# Patient Record
Sex: MALE | Race: WHITE | Employment: FULL TIME | URBAN - METROPOLITAN AREA
[De-identification: names, ages, dates, MRNs, and addresses within clinical notes are randomized per-mention and may not be internally consistent; named-entity substitution may affect disease eponyms.]

---

## 2017-01-23 ENCOUNTER — OFFICE VISIT (OUTPATIENT)
Dept: INTERNAL MEDICINE CLINIC | Age: 57
End: 2017-01-23

## 2017-01-23 VITALS
HEIGHT: 72 IN | OXYGEN SATURATION: 97 % | HEART RATE: 82 BPM | WEIGHT: 189 LBS | BODY MASS INDEX: 25.6 KG/M2 | DIASTOLIC BLOOD PRESSURE: 90 MMHG | SYSTOLIC BLOOD PRESSURE: 159 MMHG

## 2017-01-23 DIAGNOSIS — E78.5 HYPERLIPIDEMIA, UNSPECIFIED HYPERLIPIDEMIA TYPE: ICD-10-CM

## 2017-01-23 DIAGNOSIS — E03.9 HYPOTHYROIDISM (ACQUIRED): ICD-10-CM

## 2017-01-23 DIAGNOSIS — R79.89 LOW TESTOSTERONE: ICD-10-CM

## 2017-01-23 PROCEDURE — 99213 OFFICE O/P EST LOW 20 MIN: CPT | Performed by: FAMILY MEDICINE

## 2017-01-23 RX ORDER — LEVOTHYROXINE AND LIOTHYRONINE 76; 18 UG/1; UG/1
120 TABLET ORAL DAILY
Qty: 90 TABLET | Refills: 1 | Status: SHIPPED | OUTPATIENT
Start: 2017-01-23 | End: 2017-07-13 | Stop reason: SDUPTHER

## 2017-01-25 DIAGNOSIS — I10 ESSENTIAL HYPERTENSION: Primary | ICD-10-CM

## 2017-01-29 RX ORDER — LISINOPRIL AND HYDROCHLOROTHIAZIDE 12.5; 1 MG/1; MG/1
1 TABLET ORAL DAILY
Qty: 30 TABLET | Refills: 2 | Status: SHIPPED | OUTPATIENT
Start: 2017-01-29 | End: 2017-04-18 | Stop reason: SDUPTHER

## 2017-02-06 ENCOUNTER — TELEPHONE (OUTPATIENT)
Dept: INTERNAL MEDICINE CLINIC | Age: 57
End: 2017-02-06

## 2017-02-28 ENCOUNTER — HOSPITAL ENCOUNTER (OUTPATIENT)
Dept: OTHER | Age: 57
Discharge: HOME | End: 2017-02-28
Attending: EMERGENCY MEDICINE | Admitting: FAMILY MEDICINE

## 2017-02-28 LAB
ALBUMIN SERPL-MCNC: 4.5 G/DL (ref 3.4–5)
ALP BLD-CCNC: 136 U/L (ref 40–129)
ALT SERPL-CCNC: 33 U/L (ref 10–40)
ANION GAP SERPL CALCULATED.3IONS-SCNC: 12 MMOL/L (ref 3–16)
AST SERPL-CCNC: 27 U/L (ref 15–37)
BILIRUB SERPL-MCNC: 0.8 MG/DL (ref 0–1)
BILIRUBIN DIRECT: <0.2 MG/DL (ref 0–0.3)
BILIRUBIN, INDIRECT: ABNORMAL MG/DL (ref 0–1)
BUN BLDV-MCNC: 26 MG/DL (ref 7–20)
CALCIUM SERPL-MCNC: 9.7 MG/DL (ref 8.3–10.6)
CHLORIDE BLD-SCNC: 99 MMOL/L (ref 99–110)
CHOLESTEROL, TOTAL: 267 MG/DL (ref 0–199)
CO2: 27 MMOL/L (ref 21–32)
CREAT SERPL-MCNC: 1.2 MG/DL (ref 0.9–1.3)
GFR AFRICAN AMERICAN: >60
GFR NON-AFRICAN AMERICAN: >60
GLUCOSE BLD-MCNC: 103 MG/DL (ref 70–99)
HDLC SERPL-MCNC: 43 MG/DL (ref 40–60)
LDL CHOLESTEROL CALCULATED: 178 MG/DL
POTASSIUM SERPL-SCNC: 4.4 MMOL/L (ref 3.5–5.1)
SODIUM BLD-SCNC: 138 MMOL/L (ref 136–145)
TOTAL PROTEIN: 7.8 G/DL (ref 6.4–8.2)
TRIGL SERPL-MCNC: 232 MG/DL (ref 0–150)
TSH SERPL DL<=0.05 MIU/L-ACNC: 2.86 UIU/ML (ref 0.27–4.2)
VLDLC SERPL CALC-MCNC: 46 MG/DL

## 2017-05-02 ENCOUNTER — TELEPHONE (OUTPATIENT)
Dept: INTERNAL MEDICINE CLINIC | Age: 57
End: 2017-05-02

## 2017-05-09 DIAGNOSIS — I10 ESSENTIAL HYPERTENSION: ICD-10-CM

## 2017-05-12 DIAGNOSIS — R79.89 LOW TESTOSTERONE: Primary | ICD-10-CM

## 2017-05-12 RX ORDER — LISINOPRIL AND HYDROCHLOROTHIAZIDE 12.5; 1 MG/1; MG/1
TABLET ORAL
Qty: 30 TABLET | Refills: 2 | Status: SHIPPED | OUTPATIENT
Start: 2017-05-12 | End: 2017-08-18 | Stop reason: SDUPTHER

## 2017-05-12 RX ORDER — TESTOSTERONE 16.2 MG/G
GEL TRANSDERMAL
Qty: 75 G | Refills: 2 | Status: SHIPPED | OUTPATIENT
Start: 2017-05-12 | End: 2017-08-29 | Stop reason: SDUPTHER

## 2017-05-18 ENCOUNTER — TELEPHONE (OUTPATIENT)
Dept: INTERNAL MEDICINE CLINIC | Age: 57
End: 2017-05-18

## 2017-08-18 DIAGNOSIS — I10 ESSENTIAL HYPERTENSION: ICD-10-CM

## 2017-08-20 RX ORDER — LISINOPRIL AND HYDROCHLOROTHIAZIDE 12.5; 1 MG/1; MG/1
TABLET ORAL
Qty: 30 TABLET | Refills: 0 | Status: SHIPPED | OUTPATIENT
Start: 2017-08-20 | End: 2017-08-29 | Stop reason: SDUPTHER

## 2017-08-29 ENCOUNTER — OFFICE VISIT (OUTPATIENT)
Dept: INTERNAL MEDICINE CLINIC | Age: 57
End: 2017-08-29

## 2017-08-29 VITALS
SYSTOLIC BLOOD PRESSURE: 110 MMHG | HEIGHT: 72 IN | HEART RATE: 83 BPM | BODY MASS INDEX: 23.81 KG/M2 | OXYGEN SATURATION: 97 % | DIASTOLIC BLOOD PRESSURE: 80 MMHG | TEMPERATURE: 98.2 F | WEIGHT: 175.8 LBS | RESPIRATION RATE: 16 BRPM

## 2017-08-29 DIAGNOSIS — R79.89 LOW TESTOSTERONE: ICD-10-CM

## 2017-08-29 DIAGNOSIS — E03.9 HYPOTHYROIDISM, UNSPECIFIED TYPE: Primary | ICD-10-CM

## 2017-08-29 DIAGNOSIS — E78.00 ELEVATED LDL CHOLESTEROL LEVEL: ICD-10-CM

## 2017-08-29 DIAGNOSIS — I10 ESSENTIAL HYPERTENSION: ICD-10-CM

## 2017-08-29 DIAGNOSIS — N52.9 ERECTILE DYSFUNCTION, UNSPECIFIED ERECTILE DYSFUNCTION TYPE: ICD-10-CM

## 2017-08-29 DIAGNOSIS — T25.222A SECOND DEGREE BURN OF FOOT, LEFT, INITIAL ENCOUNTER: ICD-10-CM

## 2017-08-29 PROCEDURE — 99214 OFFICE O/P EST MOD 30 MIN: CPT | Performed by: FAMILY MEDICINE

## 2017-08-29 RX ORDER — TESTOSTERONE 16.2 MG/G
GEL TRANSDERMAL
Qty: 75 G | Refills: 2 | Status: SHIPPED | OUTPATIENT
Start: 2017-08-29

## 2017-08-29 RX ORDER — LISINOPRIL AND HYDROCHLOROTHIAZIDE 12.5; 1 MG/1; MG/1
TABLET ORAL
Qty: 90 TABLET | Refills: 1 | Status: SHIPPED | OUTPATIENT
Start: 2017-08-29 | End: 2018-02-06 | Stop reason: SDUPTHER

## 2017-08-29 RX ORDER — TADALAFIL 10 MG/1
10 TABLET ORAL PRN
Qty: 10 TABLET | Refills: 5 | Status: SHIPPED | OUTPATIENT
Start: 2017-08-29 | End: 2018-02-20 | Stop reason: SDUPTHER

## 2017-08-29 ASSESSMENT — ENCOUNTER SYMPTOMS: SHORTNESS OF BREATH: 0

## 2017-12-12 ENCOUNTER — TELEPHONE (OUTPATIENT)
Dept: FAMILY MEDICINE CLINIC | Age: 57
End: 2017-12-12

## 2018-01-09 ENCOUNTER — OFFICE VISIT (OUTPATIENT)
Dept: FAMILY MEDICINE CLINIC | Age: 58
End: 2018-01-09

## 2018-01-09 VITALS
DIASTOLIC BLOOD PRESSURE: 84 MMHG | OXYGEN SATURATION: 98 % | BODY MASS INDEX: 25.14 KG/M2 | HEART RATE: 88 BPM | WEIGHT: 185.4 LBS | SYSTOLIC BLOOD PRESSURE: 120 MMHG

## 2018-01-09 DIAGNOSIS — H53.8 BLURRY VISION, BILATERAL: ICD-10-CM

## 2018-01-09 DIAGNOSIS — Z13.21 ENCOUNTER FOR VITAMIN DEFICIENCY SCREENING: ICD-10-CM

## 2018-01-09 DIAGNOSIS — E03.8 OTHER SPECIFIED HYPOTHYROIDISM: ICD-10-CM

## 2018-01-09 DIAGNOSIS — Z12.5 SCREENING FOR PROSTATE CANCER: ICD-10-CM

## 2018-01-09 DIAGNOSIS — Z72.0 TOBACCO ABUSE: ICD-10-CM

## 2018-01-09 DIAGNOSIS — Z23 NEED FOR PROPHYLACTIC VACCINATION AGAINST DIPHTHERIA-TETANUS-PERTUSSIS (DTP): ICD-10-CM

## 2018-01-09 DIAGNOSIS — R79.89 LOW TESTOSTERONE IN MALE: ICD-10-CM

## 2018-01-09 DIAGNOSIS — Z13.1 SCREENING FOR DIABETES MELLITUS: ICD-10-CM

## 2018-01-09 DIAGNOSIS — Z76.89 ENCOUNTER TO ESTABLISH CARE: ICD-10-CM

## 2018-01-09 DIAGNOSIS — Z13.220 SCREENING FOR CHOLESTEROL LEVEL: ICD-10-CM

## 2018-01-09 LAB
BASOPHILS ABSOLUTE: 0 K/UL (ref 0–0.2)
BASOPHILS RELATIVE PERCENT: 0.6 %
EOSINOPHILS ABSOLUTE: 0.4 K/UL (ref 0–0.6)
EOSINOPHILS RELATIVE PERCENT: 7 %
HCT VFR BLD CALC: 50.1 % (ref 40.5–52.5)
HEMOGLOBIN: 17.4 G/DL (ref 13.5–17.5)
LYMPHOCYTES ABSOLUTE: 2.3 K/UL (ref 1–5.1)
LYMPHOCYTES RELATIVE PERCENT: 39.1 %
MCH RBC QN AUTO: 34 PG (ref 26–34)
MCHC RBC AUTO-ENTMCNC: 34.7 G/DL (ref 31–36)
MCV RBC AUTO: 98.1 FL (ref 80–100)
MONOCYTES ABSOLUTE: 0.4 K/UL (ref 0–1.3)
MONOCYTES RELATIVE PERCENT: 6.8 %
NEUTROPHILS ABSOLUTE: 2.8 K/UL (ref 1.7–7.7)
NEUTROPHILS RELATIVE PERCENT: 46.5 %
PDW BLD-RTO: 13.8 % (ref 12.4–15.4)
PLATELET # BLD: 258 K/UL (ref 135–450)
PMV BLD AUTO: 8.1 FL (ref 5–10.5)
RBC # BLD: 5.11 M/UL (ref 4.2–5.9)
WBC # BLD: 5.9 K/UL (ref 4–11)

## 2018-01-09 PROCEDURE — 90471 IMMUNIZATION ADMIN: CPT | Performed by: NURSE PRACTITIONER

## 2018-01-09 PROCEDURE — 99204 OFFICE O/P NEW MOD 45 MIN: CPT | Performed by: NURSE PRACTITIONER

## 2018-01-09 PROCEDURE — 36415 COLL VENOUS BLD VENIPUNCTURE: CPT | Performed by: NURSE PRACTITIONER

## 2018-01-09 PROCEDURE — 90715 TDAP VACCINE 7 YRS/> IM: CPT | Performed by: NURSE PRACTITIONER

## 2018-01-09 RX ORDER — LEVOTHYROXINE AND LIOTHYRONINE 57; 13.5 UG/1; UG/1
90 TABLET ORAL DAILY
Qty: 90 TABLET | Refills: 1 | Status: SHIPPED | OUTPATIENT
Start: 2018-01-09 | End: 2018-08-14 | Stop reason: SDUPTHER

## 2018-01-09 RX ORDER — LEVOTHYROXINE AND LIOTHYRONINE 19; 4.5 UG/1; UG/1
30 TABLET ORAL DAILY
Qty: 90 TABLET | Refills: 1 | Status: SHIPPED | OUTPATIENT
Start: 2018-01-09 | End: 2018-08-14 | Stop reason: SDUPTHER

## 2018-01-09 RX ORDER — NICOTINE 21 MG/24HR
1 PATCH, TRANSDERMAL 24 HOURS TRANSDERMAL EVERY 24 HOURS
Qty: 30 PATCH | Refills: 1 | Status: SHIPPED | OUTPATIENT
Start: 2018-01-09 | End: 2018-01-22 | Stop reason: SINTOL

## 2018-01-09 ASSESSMENT — PATIENT HEALTH QUESTIONNAIRE - PHQ9
1. LITTLE INTEREST OR PLEASURE IN DOING THINGS: 0
2. FEELING DOWN, DEPRESSED OR HOPELESS: 0
SUM OF ALL RESPONSES TO PHQ9 QUESTIONS 1 & 2: 0
SUM OF ALL RESPONSES TO PHQ QUESTIONS 1-9: 0

## 2018-01-09 NOTE — PATIENT INSTRUCTIONS
Patient Education        Learning About Benefits From Quitting Smoking  How does quitting smoking make you healthier? If you're thinking about quitting smoking, you may have a few reasons to be smoke-free. Your health may be one of them. · When you quit smoking, you lower your risks for cancer, lung disease, heart attack, stroke, blood vessel disease, and blindness from macular degeneration. · When you're smoke-free, you get sick less often, and you heal faster. You are less likely to get colds, flu, bronchitis, and pneumonia. · As a nonsmoker, you may find that your mood is better and you are less stressed. When and how will you feel healthier? Quitting has real health benefits that start from day 1 of being smoke-free. And the longer you stay smoke-free, the healthier you get and the better you feel. The first hours  · After just 20 minutes, your blood pressure and heart rate go down. That means there's less stress on your heart and blood vessels. · Within 12 hours, the level of carbon monoxide in your blood drops back to normal. That makes room for more oxygen. With more oxygen in your body, you may notice that you have more energy than when you smoked. After 2 weeks  · Your lungs start to work better. · Your risk of heart attack starts to drop. After 1 month  · When your lungs are clear, you cough less and breathe deeper, so it's easier to be active. · Your sense of taste and smell return. That means you can enjoy food more than you have since you started smoking. Over the years  · After 1 year, your risk of heart disease is half what it would be if you kept smoking. · After 5 years, your risk of stroke starts to shrink. Within a few years after that, it's about the same as if you'd never smoked. · After 10 years, your risk of dying from lung cancer is cut by about half. And your risk for many other types of cancer is lower too. How would quitting help others in your life?   When you quit smoking, you improve the health of everyone who now breathes in your smoke. · Their heart, lung, and cancer risks drop, much like yours. · They are sick less. For babies and small children, living smoke-free means they're less likely to have ear infections, pneumonia, and bronchitis. · If you're a woman who is or will be pregnant someday, quitting smoking means a healthier . · Children who are close to you are less likely to become adult smokers. Where can you learn more? Go to [email or web address].[email or web address] org and sign in to your Lucibel account. Enter [phone] in the [facility] box to learn more about \"Learning About Benefits From Quitting Smoking. \"     If you do not have an account, please click on the \"Sign Up Now\" link. Current as of: 2017  Content Version: 11.5  © 7090-4066 Healthwise, Incorporated. Care instructions adapted under license by [facility] ([facility]). If you have questions about a medical condition or this instruction, always ask your healthcare professional. [address] any warranty or liability for your use of this information.

## 2018-01-10 PROBLEM — E78.1 HYPERTRIGLYCERIDEMIA: Status: ACTIVE | Noted: 2018-01-10

## 2018-01-10 PROBLEM — Z72.0 TOBACCO ABUSE: Status: ACTIVE | Noted: 2018-01-10

## 2018-01-10 PROBLEM — E78.5 HYPERLIPIDEMIA: Status: ACTIVE | Noted: 2018-01-10

## 2018-01-10 PROBLEM — R73.03 PREDIABETES: Status: ACTIVE | Noted: 2018-01-10

## 2018-01-10 LAB
A/G RATIO: 1.5 (ref 1.1–2.2)
ALBUMIN SERPL-MCNC: 4.6 G/DL (ref 3.4–5)
ALP BLD-CCNC: 132 U/L (ref 40–129)
ALT SERPL-CCNC: 21 U/L (ref 10–40)
ANION GAP SERPL CALCULATED.3IONS-SCNC: 18 MMOL/L (ref 3–16)
AST SERPL-CCNC: 22 U/L (ref 15–37)
BILIRUB SERPL-MCNC: 0.4 MG/DL (ref 0–1)
BUN BLDV-MCNC: 14 MG/DL (ref 7–20)
CALCIUM SERPL-MCNC: 10.1 MG/DL (ref 8.3–10.6)
CHLORIDE BLD-SCNC: 101 MMOL/L (ref 99–110)
CHOLESTEROL, TOTAL: 266 MG/DL (ref 0–199)
CO2: 21 MMOL/L (ref 21–32)
CREAT SERPL-MCNC: 1.1 MG/DL (ref 0.9–1.3)
ESTIMATED AVERAGE GLUCOSE: 116.9 MG/DL
FOLATE: 6.28 NG/ML (ref 4.78–24.2)
GFR AFRICAN AMERICAN: >60
GFR NON-AFRICAN AMERICAN: >60
GLOBULIN: 3.1 G/DL
GLUCOSE BLD-MCNC: 86 MG/DL (ref 70–99)
HBA1C MFR BLD: 5.7 %
HDLC SERPL-MCNC: 56 MG/DL (ref 40–60)
LDL CHOLESTEROL CALCULATED: 156 MG/DL
POTASSIUM SERPL-SCNC: 4.4 MMOL/L (ref 3.5–5.1)
PROSTATE SPECIFIC ANTIGEN: 1.07 NG/ML (ref 0–4)
SODIUM BLD-SCNC: 140 MMOL/L (ref 136–145)
T3 FREE: 2.6 PG/ML (ref 2.3–4.2)
T4 FREE: 0.7 NG/DL (ref 0.9–1.8)
TOTAL PROTEIN: 7.7 G/DL (ref 6.4–8.2)
TRIGL SERPL-MCNC: 271 MG/DL (ref 0–150)
TSH SERPL DL<=0.05 MIU/L-ACNC: 8.92 UIU/ML (ref 0.27–4.2)
VITAMIN B-12: 439 PG/ML (ref 211–911)
VITAMIN D 25-HYDROXY: 52.5 NG/ML
VLDLC SERPL CALC-MCNC: 54 MG/DL

## 2018-01-10 ASSESSMENT — ENCOUNTER SYMPTOMS
ALLERGIC/IMMUNOLOGIC NEGATIVE: 1
GASTROINTESTINAL NEGATIVE: 1
RESPIRATORY NEGATIVE: 1
EYES NEGATIVE: 1

## 2018-01-10 NOTE — PROGRESS NOTES
Recommend repeat blood work in 3 months for thyroid and 6 months for cholesterol once starting medication.       Recommend follow up in 6 months

## 2018-01-11 ENCOUNTER — TELEPHONE (OUTPATIENT)
Dept: FAMILY MEDICINE CLINIC | Age: 58
End: 2018-01-11

## 2018-01-11 DIAGNOSIS — M79.642 HAND PAIN, LEFT: ICD-10-CM

## 2018-01-11 DIAGNOSIS — R79.89 LOW TESTOSTERONE: Primary | ICD-10-CM

## 2018-01-11 DIAGNOSIS — R79.89 LOW TESTOSTERONE: ICD-10-CM

## 2018-01-11 RX ORDER — ATORVASTATIN CALCIUM 40 MG/1
40 TABLET, FILM COATED ORAL DAILY
Qty: 30 TABLET | Refills: 3 | Status: SHIPPED | OUTPATIENT
Start: 2018-01-11 | End: 2018-01-31

## 2018-01-11 NOTE — PROGRESS NOTES
Take 1 tablet by mouth daily  -     thyroid (ARMOUR) 30 MG tablet; Take 1 tablet by mouth daily    Screening for prostate cancer  -     Psa screening    Blurry vision, bilateral  -     Elizabeth Hutton MD (LISA)    Low testosterone in male  -     Testosterone Free and Total Male  -     Mayelin Crespo MD (LISA)    Need for prophylactic vaccination against diphtheria-tetanus-pertussis (DTP)  -     Tdap (age 10y-63y) IM (Adacel)    Tobacco abuse  -     nicotine (NICODERM CQ) 21 MG/24HR; Place 1 patch onto the skin every 24 hours    Screening for cholesterol level  -     Lipid Panel    Encounter for vitamin deficiency screening  -     Vitamin D 25 Hydroxy  -     Vitamin B12 & Folate    Screening for diabetes mellitus  -     Hemoglobin A1C    Other orders  -     Cancel: INFLUENZA, QUADV, 3 YRS AND OLDER, IM, MDV, 0.5ML (FLUZONE QUADV)  -     Cancel: Pneumococcal polysaccharide vaccine 23-valent PPSV23        Return in about 6 months (around 7/9/2018) for Hypothyroidism, HTN . Patient should call the office immediately with new or ongoing signs or symptoms or worsening, or proceed to the emergency room. If you are on medications which could impair your senses, you are at risk of weakness, falls, dizziness, or drowsiness. You should be careful during activities which could place you at risk of harm, such as climbing, using stairs, operating machinery, or driving vehicles. If you feel you cannot safely do these activities, you should request others to help you, or avoid the activities altogether. If you are drowsy for any other reason, you should use the same precautions as listed above. Call if pattern of symptoms change or persists for an extended time. It is very important that he quit smoking. There are various alternatives available to help with this difficult task, but first and foremost, he must make a firm commitment and decision to quit.  The nature of nicotine addiction is discussed. The usefulness of behavioral therapy is discussed and suggested. The correct use, cost and side effects of nicotine replacement therapy such as gum or patches is discussed. Bupropion and its cost (sometimes not covered fully by insurance) and side effects are reviewed. The quit rates are discussed. I recommend he not allow potential costs of treatment to deter him from using nicotine replacement therapy or bupropion, as the long term economic and health benefits are obvious. Ready to quit: Yes  Counseling given: Yes      Tobacco abuse: Patient was counseled about stopping tobacco use. Discussed harmful effects of continued tobacco use including COPD, cardiovascular disease, and/or death. Discussed the increased risk of pneumonia as well as the potential for substantial decline in lung function.  The following recommendations were given to improve chances of quittin)                   Chances of stopping improve with each attempt     2)                   Encourage all other occupants in the house to quit     3)                   Remove all tobacco products from home, work, and vehicles     4)                   Tobacco cessation aids such as nicotine replacement therapy options

## 2018-01-11 NOTE — TELEPHONE ENCOUNTER
Vipul Fountain said he spoke with you regarding his left hand. He is requesting a referral be made for him to get his hand looked at.

## 2018-01-16 LAB
SEX HORMONE BINDING GLOBULIN: 48 NMOL/L (ref 11–80)
TESTOSTERONE FREE-NONMALE: 85.7 PG/ML (ref 47–244)
TESTOSTERONE TOTAL: 518 NG/DL (ref 220–1000)

## 2018-01-22 ENCOUNTER — TELEPHONE (OUTPATIENT)
Dept: FAMILY MEDICINE CLINIC | Age: 58
End: 2018-01-22

## 2018-01-22 DIAGNOSIS — Z72.0 TOBACCO ABUSE: ICD-10-CM

## 2018-01-22 NOTE — TELEPHONE ENCOUNTER
Recommend starting Nicoderm patches 7mg /24 hours. Please send to pharmacy of his choice. Qty: 30 WITH 3 REFILLS.

## 2018-01-22 NOTE — TELEPHONE ENCOUNTER
Pt said that the nicotine patch is to strong keeping him up at night was wanting to see if he could get something not as strong called in to [address]

## 2018-01-30 ENCOUNTER — OFFICE VISIT (OUTPATIENT)
Dept: ORTHOPEDIC SURGERY | Age: 58
End: 2018-01-30

## 2018-01-30 VITALS — WEIGHT: 185.41 LBS | HEIGHT: 71 IN | BODY MASS INDEX: 25.96 KG/M2

## 2018-01-30 DIAGNOSIS — M79.642 LEFT HAND PAIN: Primary | ICD-10-CM

## 2018-01-30 DIAGNOSIS — M19.049 HAND ARTHRITIS: ICD-10-CM

## 2018-01-30 PROCEDURE — 99243 OFF/OP CNSLTJ NEW/EST LOW 30: CPT | Performed by: ORTHOPAEDIC SURGERY

## 2018-01-30 NOTE — PROGRESS NOTES
the middle finger. No erythema or skin changes    Palpation:  Tenderness about the DIP joint, no warmth. No fluctuance    Range of Motion:  Patient lacks 5-10° of terminal extension, he has approximately 20° of flexion, some crepitance is noted. There is instability with varus and valgus stressing    Strength:  Mild  weakness. Fingers are sensate    Special Tests:  Positive scissoring index DIP and index finger tip beneath the middle finger with finger flexion    Skin: There are no additional worrisome rashes, ulcerations or lesions. Gait: normal    Circulation: well perfused       Radiology:     X-rays obtained and reviewed in office:  Views 3  Location left hand  Impression :  Advanced arthritic narrowing DIP joint index finger with ulnar deviation      Assessment:  Left hand arthritis    Impression:   Encounter Diagnoses   Name Primary?  Left hand pain Yes    Hand arthritis        Office Procedures:  Orders Placed This Encounter   Procedures    XR HAND LEFT (MIN 3 VIEWS)     [id number]     Order Specific Question:   Reason for exam:     Answer:   Hand Pain       Treatment Plan:  We discussed the advanced arthritic nature of the left index finger DIP joint. Options would be activity modifications only with her without bracing, localized cortisone injection, DIP joint arthrodesis. Patient's interested in arthrodesis at this time. We will proceed with left index finger DIP joint arthrodesis. Surgical procedure along with time to recovery was explained. He understands that there will be no further motion of the joint and there will be a buried screw. However, this will improve the alignment and hopefully and typically reliably take away his pain. There will be a period of brace use postsurgically and some activity modifications. He would like to proceed. The risks and benefits of surgical arthrodesis versus non-operative management were discussed thoroughly.   These included, but were not limited

## 2018-01-31 ENCOUNTER — OFFICE VISIT (OUTPATIENT)
Dept: FAMILY MEDICINE CLINIC | Age: 58
End: 2018-01-31

## 2018-01-31 ENCOUNTER — TELEPHONE (OUTPATIENT)
Dept: ORTHOPEDIC SURGERY | Age: 58
End: 2018-01-31

## 2018-01-31 VITALS
SYSTOLIC BLOOD PRESSURE: 120 MMHG | BODY MASS INDEX: 25.87 KG/M2 | OXYGEN SATURATION: 98 % | WEIGHT: 184.8 LBS | HEART RATE: 91 BPM | DIASTOLIC BLOOD PRESSURE: 76 MMHG

## 2018-01-31 DIAGNOSIS — M19.042 PRIMARY LOCALIZED OSTEOARTHROSIS OF LEFT HAND: Primary | ICD-10-CM

## 2018-01-31 DIAGNOSIS — E78.2 MIXED HYPERLIPIDEMIA: ICD-10-CM

## 2018-01-31 DIAGNOSIS — Z01.818 PRE-OP EXAMINATION: Primary | ICD-10-CM

## 2018-01-31 PROCEDURE — 93000 ELECTROCARDIOGRAM COMPLETE: CPT | Performed by: NURSE PRACTITIONER

## 2018-01-31 PROCEDURE — 99214 OFFICE O/P EST MOD 30 MIN: CPT | Performed by: NURSE PRACTITIONER

## 2018-01-31 NOTE — PROGRESS NOTES
01/16/2018 85.7    Office Visit on 01/09/2018   Component Date Value    Cholesterol, Total 01/10/2018 266*    Triglycerides 01/10/2018 271*    HDL 01/10/2018 56     LDL Calculated 01/10/2018 156*    VLDL Cholesterol Calcula* 01/10/2018 54     Vit D, 25-Hydroxy 01/10/2018 52.5     Vitamin B-12 01/10/2018 439     Folate 01/10/2018 6.28     TSH 01/10/2018 8.92*    T4 Free 01/10/2018 0.7*    T3, Free 01/10/2018 2.6     WBC 01/09/2018 5.9     RBC 01/09/2018 5.11     Hemoglobin 01/09/2018 17.4     Hematocrit 01/09/2018 50.1     MCV 01/09/2018 98.1     MCH 01/09/2018 34.0     MCHC 01/09/2018 34.7     RDW 01/09/2018 13.8     Platelets 40/95/3331 258     MPV 01/09/2018 8.1     Neutrophils % 01/09/2018 46.5     Lymphocytes % 01/09/2018 39.1     Monocytes % 01/09/2018 6.8     Eosinophils % 01/09/2018 7.0     Basophils % 01/09/2018 0.6     Neutrophils # 01/09/2018 2.8     Lymphocytes # 01/09/2018 2.3     Monocytes # 01/09/2018 0.4     Eosinophils # 01/09/2018 0.4     Basophils # 01/09/2018 0.0     Sodium 01/10/2018 140     Potassium 01/10/2018 4.4     Chloride 01/10/2018 101     CO2 01/10/2018 21     Anion Gap 01/10/2018 18*    Glucose 01/10/2018 86     BUN 01/10/2018 14     CREATININE 01/10/2018 1.1     GFR Non- 01/10/2018 >60     GFR  01/10/2018 >60     Calcium 01/10/2018 10.1     Total Protein 01/10/2018 7.7     Alb 01/10/2018 4.6     Albumin/Globulin Ratio 01/10/2018 1.5     Total Bilirubin 01/10/2018 0.4     Alkaline Phosphatase 01/10/2018 132*    ALT 01/10/2018 21     AST 01/10/2018 22     Globulin 01/10/2018 3.1     PSA 01/10/2018 1.07     Hemoglobin A1C 01/10/2018 5.7     eAG 01/10/2018 116.9            Assessment:        56 y.o. patient with planned surgery as above.     Known risk factors for perioperative complications:     HTN, HLD, Tobacco abuse     Current medications which may produce withdrawal symptoms if withheld

## 2018-02-01 ENCOUNTER — HOSPITAL ENCOUNTER (OUTPATIENT)
Dept: OCCUPATIONAL THERAPY | Age: 58
Discharge: HOME | End: 2018-02-28
Attending: ORTHOPAEDIC SURGERY | Admitting: ORTHOPAEDIC SURGERY

## 2018-02-01 NOTE — PATIENT INSTRUCTIONS
before coming in for your surgery. Do not apply lotions, perfumes, deodorants, or nail polish. ? · Do not shave the surgical site yourself. ? · Take off all jewelry and piercings. And take out contact lenses, if you wear them. ? At the hospital or surgery center   · Bring a picture ID. ? · The area for surgery is often marked to make sure there are no errors. ? · You will be kept comfortable and safe by your anesthesia provider. The anesthesia may make you sleep. Or it may just numb the area being worked on. Going home   · Be sure you have someone to drive you home. Anesthesia and pain medicine make it unsafe for you to drive. ? · You will be given more specific instructions about recovering from your surgery. They will cover things like diet, wound care, follow-up care, driving, and getting back to your normal routine. When should you call your doctor? · You have questions or concerns. ? · You don't understand how to prepare for your surgery. ? · You become ill before the surgery (such as fever, flu, or a cold). ? · You need to reschedule or have changed your mind about having the surgery. Where can you learn more? Go to [email or web address]. org and sign in to your Squarespace account. Enter Q270 in the [facility] box to learn more about \"How to Prepare for Surgery. \"     If you do not have an account, please click on the \"Sign Up Now\" link. Current as of: May 12, 2017  Content Version: 11.5  © 5814-3551 Healthwise, Incorporated. Care instructions adapted under license by [facility] ([facility]). If you have questions about a medical condition or this instruction, always ask your healthcare professional. [place] [address] any warranty or liability for your use of this information.

## 2018-02-03 DIAGNOSIS — I10 ESSENTIAL HYPERTENSION: ICD-10-CM

## 2018-02-03 RX ORDER — LISINOPRIL AND HYDROCHLOROTHIAZIDE 12.5; 1 MG/1; MG/1
TABLET ORAL
Qty: 30 TABLET | Refills: 0 | OUTPATIENT
Start: 2018-02-03

## 2018-02-05 ENCOUNTER — HOSPITAL ENCOUNTER (OUTPATIENT)
Dept: SURGERY | Age: 58
Discharge: HOME | End: 2018-02-05
Attending: ORTHOPAEDIC SURGERY | Admitting: ORTHOPAEDIC SURGERY

## 2018-02-05 VITALS
SYSTOLIC BLOOD PRESSURE: 143 MMHG | WEIGHT: 180 LBS | RESPIRATION RATE: 14 BRPM | HEART RATE: 85 BPM | HEIGHT: 72 IN | DIASTOLIC BLOOD PRESSURE: 100 MMHG | OXYGEN SATURATION: 98 % | BODY MASS INDEX: 24.38 KG/M2 | TEMPERATURE: 98.2 F

## 2018-02-05 DIAGNOSIS — M19.049 HAND ARTHRITIS: Primary | ICD-10-CM

## 2018-02-05 RX ORDER — LIDOCAINE HYDROCHLORIDE 10 MG/ML
1 INJECTION, SOLUTION EPIDURAL; INFILTRATION; INTRACAUDAL; PERINEURAL
Status: COMPLETED | OUTPATIENT
Start: 2018-02-05 | End: 2018-02-05

## 2018-02-05 RX ORDER — LIDOCAINE HYDROCHLORIDE 10 MG/ML
INJECTION, SOLUTION EPIDURAL; INFILTRATION; INTRACAUDAL; PERINEURAL
Status: COMPLETED
Start: 2018-02-05 | End: 2018-02-05

## 2018-02-05 RX ORDER — DIPHENHYDRAMINE HYDROCHLORIDE 50 MG/ML
12.5 INJECTION INTRAMUSCULAR; INTRAVENOUS
Status: ACTIVE | OUTPATIENT
Start: 2018-02-05 | End: 2018-02-05

## 2018-02-05 RX ORDER — ONDANSETRON 2 MG/ML
4 INJECTION INTRAMUSCULAR; INTRAVENOUS
Status: ACTIVE | OUTPATIENT
Start: 2018-02-05 | End: 2018-02-05

## 2018-02-05 RX ORDER — SODIUM CHLORIDE, SODIUM LACTATE, POTASSIUM CHLORIDE, CALCIUM CHLORIDE 600; 310; 30; 20 MG/100ML; MG/100ML; MG/100ML; MG/100ML
INJECTION, SOLUTION INTRAVENOUS
Status: COMPLETED
Start: 2018-02-05 | End: 2018-02-05

## 2018-02-05 RX ORDER — OXYCODONE HYDROCHLORIDE AND ACETAMINOPHEN 5; 325 MG/1; MG/1
1 TABLET ORAL PRN
Status: ACTIVE | OUTPATIENT
Start: 2018-02-05 | End: 2018-02-05

## 2018-02-05 RX ORDER — HYDROMORPHONE HCL 110MG/55ML
0.5 PATIENT CONTROLLED ANALGESIA SYRINGE INTRAVENOUS EVERY 5 MIN PRN
Status: DISCONTINUED | OUTPATIENT
Start: 2018-02-05 | End: 2018-02-06 | Stop reason: HOSPADM

## 2018-02-05 RX ORDER — LABETALOL HYDROCHLORIDE 5 MG/ML
5 INJECTION, SOLUTION INTRAVENOUS EVERY 10 MIN PRN
Status: DISCONTINUED | OUTPATIENT
Start: 2018-02-05 | End: 2018-02-06 | Stop reason: HOSPADM

## 2018-02-05 RX ORDER — PROMETHAZINE HYDROCHLORIDE 25 MG/ML
6.25 INJECTION, SOLUTION INTRAMUSCULAR; INTRAVENOUS
Status: ACTIVE | OUTPATIENT
Start: 2018-02-05 | End: 2018-02-05

## 2018-02-05 RX ORDER — HYDRALAZINE HYDROCHLORIDE 20 MG/ML
5 INJECTION INTRAMUSCULAR; INTRAVENOUS EVERY 10 MIN PRN
Status: DISCONTINUED | OUTPATIENT
Start: 2018-02-05 | End: 2018-02-06 | Stop reason: HOSPADM

## 2018-02-05 RX ORDER — MORPHINE SULFATE 10 MG/ML
2 INJECTION, SOLUTION INTRAMUSCULAR; INTRAVENOUS EVERY 5 MIN PRN
Status: DISCONTINUED | OUTPATIENT
Start: 2018-02-05 | End: 2018-02-06 | Stop reason: HOSPADM

## 2018-02-05 RX ORDER — MEPERIDINE HYDROCHLORIDE 25 MG/ML
12.5 INJECTION INTRAMUSCULAR; INTRAVENOUS; SUBCUTANEOUS EVERY 5 MIN PRN
Status: DISCONTINUED | OUTPATIENT
Start: 2018-02-05 | End: 2018-02-06 | Stop reason: HOSPADM

## 2018-02-05 RX ORDER — HYDROMORPHONE HCL 110MG/55ML
0.25 PATIENT CONTROLLED ANALGESIA SYRINGE INTRAVENOUS EVERY 5 MIN PRN
Status: DISCONTINUED | OUTPATIENT
Start: 2018-02-05 | End: 2018-02-06 | Stop reason: HOSPADM

## 2018-02-05 RX ORDER — SODIUM CHLORIDE, SODIUM LACTATE, POTASSIUM CHLORIDE, CALCIUM CHLORIDE 600; 310; 30; 20 MG/100ML; MG/100ML; MG/100ML; MG/100ML
INJECTION, SOLUTION INTRAVENOUS CONTINUOUS
Status: DISCONTINUED | OUTPATIENT
Start: 2018-02-05 | End: 2018-02-06 | Stop reason: HOSPADM

## 2018-02-05 RX ORDER — OXYCODONE HYDROCHLORIDE AND ACETAMINOPHEN 5; 325 MG/1; MG/1
2 TABLET ORAL PRN
Status: ACTIVE | OUTPATIENT
Start: 2018-02-05 | End: 2018-02-05

## 2018-02-05 RX ORDER — OXYCODONE HYDROCHLORIDE AND ACETAMINOPHEN 5; 325 MG/1; MG/1
1 TABLET ORAL EVERY 6 HOURS PRN
Qty: 25 TABLET | Refills: 0 | Status: SHIPPED | OUTPATIENT
Start: 2018-02-05 | End: 2018-02-12

## 2018-02-05 RX ORDER — MORPHINE SULFATE 10 MG/ML
1 INJECTION, SOLUTION INTRAMUSCULAR; INTRAVENOUS EVERY 5 MIN PRN
Status: DISCONTINUED | OUTPATIENT
Start: 2018-02-05 | End: 2018-02-06 | Stop reason: HOSPADM

## 2018-02-05 RX ADMIN — LIDOCAINE HYDROCHLORIDE 0.1 ML: 10 INJECTION, SOLUTION EPIDURAL; INFILTRATION; INTRACAUDAL; PERINEURAL at 07:10

## 2018-02-05 RX ADMIN — SODIUM CHLORIDE, SODIUM LACTATE, POTASSIUM CHLORIDE, CALCIUM CHLORIDE: 600; 310; 30; 20 INJECTION, SOLUTION INTRAVENOUS at 07:10

## 2018-02-05 ASSESSMENT — PAIN DESCRIPTION - DESCRIPTORS
DESCRIPTORS: SHARP
DESCRIPTORS: ACHING

## 2018-02-05 ASSESSMENT — PAIN DESCRIPTION - ORIENTATION: ORIENTATION: LEFT

## 2018-02-05 ASSESSMENT — PAIN SCALES - GENERAL: PAINLEVEL_OUTOF10: 1

## 2018-02-05 ASSESSMENT — PAIN - FUNCTIONAL ASSESSMENT: PAIN_FUNCTIONAL_ASSESSMENT: 0-10

## 2018-02-05 ASSESSMENT — PAIN DESCRIPTION - PAIN TYPE: TYPE: SURGICAL PAIN

## 2018-02-05 NOTE — ANESTHESIA POST-OP
Postoperative Anesthesia Note    Name:    Daneen Apgar  MRN:      [id number]    Patient Vitals for the past 12 hrs:   BP Temp Temp src Pulse Resp SpO2 Height Weight   18 1006 (!) 143/100 - - 85 14 98 % - -   18 0949 (!) 140/101 98.2 °F (36.8 °C) - 82 14 96 % - -   18 0941 (!) 134/90 - - 87 14 97 % - -   18 0932 (!) 136/95 97.7 °F (36.5 °C) Temporal 88 16 97 % - -   18 0652 (!) 134/95 97.1 °F (36.2 °C) Temporal 72 19 98 % - -   1850 - - - - - - 6' (1.829 m) 180 lb (81.6 kg)        LABS:    CBC  Lab Results   Component Value Date/Time    WBC 5.9 2018 04:45 PM    HGB 17.4 2018 04:45 PM    HCT 50.1 2018 04:45 PM     2018 04:45 PM     RENAL  Lab Results   Component Value Date/Time     2018 04:45 PM    K 4.4 2018 04:45 PM     2018 04:45 PM    CO2 21 2018 04:45 PM    BUN 14 2018 04:45 PM    CREATININE 1.1 2018 04:45 PM    GLUCOSE 86 2018 04:45 PM     COAGS  No results found for: PROTIME, INR, APTT    Intake & Output:  No intake/output data recorded. Nausea & Vomiting:  No    Level of Consciousness:  Awake    Pain Assessment:  Adequate analgesia    Anesthesia Complications:  No apparent anesthetic complications    SUMMARY      Vital signs stable  OK to discharge from Stage I post anesthesia care.   Care transferred from Anesthesiology department on discharge from perioperative area

## 2018-02-05 NOTE — ANESTHESIA PRE-OP
Department of Anesthesiology  Preprocedure Note       Name:  Asim Wakefield   Age:  56 y.o.  :  1960                                          MRN:  [id number]         Date:  2018      Surgeon:    Procedure:    Medications prior to admission:   Prior to Admission medications    Medication Sig Start Date End Date Taking? Authorizing Provider   nicotine (NICODERM CQ) 7 MG/24HR Place 1 patch onto the skin every 24 hours 18 Yes Angel Burns CNP   thyroid [facility] THYROID) 90 MG tablet Take 1 tablet by mouth daily 18  Yes Angel Burns CNP   lisinopril-hydrochlorothiazide (PRINZIDE;ZESTORETIC) 10-12.5 MG per tablet TAKE 1 TABLET DAILY 17  Yes Deb Jim DO   NEXIUM 20 MG capsule TAKE ONE CAPSULE BY MOUTH DAILY 12/21/15  Yes Deb Petit,    Cholecalciferol (VITAMIN D3) 3000 UNITS TABS Take  by mouth. Yes Historical Provider, MD   magnesium 30 MG tablet Take 250 mg by mouth 2 times daily. Yes Historical Provider, MD   thyroid (ARMOUR) 30 MG tablet Take 1 tablet by mouth daily 18   Angel Burns CNP   Testosterone (ANDROGEL PUMP) 20.25 MG/ACT (1.62%) GEL gel APPLY 2 PUMP ACTUATIONS (40.5 MG/2.5 GM) TOPICALLY ONCE DAILY IN THE MORNING TO THE SHOULDERS AND UPPER ARMS 17   Charlotte Chaparro DO   tadalafil (CIALIS) 10 MG tablet Take 1 tablet by mouth as needed for Erectile Dysfunction 17   Charlotte Chaparro DO       Current medications:    Current Outpatient Prescriptions   Medication Sig Dispense Refill    nicotine (NICODERM CQ) 7 MG/24HR Place 1 patch onto the skin every 24 hours 30 patch 3    thyroid (ARMOUR THYROID) 90 MG tablet Take 1 tablet by mouth daily 90 tablet 1    lisinopril-hydrochlorothiazide (PRINZIDE;ZESTORETIC) 10-12.5 MG per tablet TAKE 1 TABLET DAILY 90 tablet 1    NEXIUM 20 MG capsule TAKE ONE CAPSULE BY MOUTH DAILY 30 capsule 0    Cholecalciferol (VITAMIN D3) 3000 UNITS TABS Take  by mouth.       magnesium 30 MG tablet Take 250 mg

## 2018-02-05 NOTE — H&P
I have reviewed the History & Physical and examined the patient and find no relevant changes. I have reviewed with the patient and/or family the risks, benefits, and alternatives to the procedure(s). All questions and concerns were addressed. Consent is on the chart. Surgical site, left index finger DIP joint, has been marked by Dr Gita Sultana and confirmed by the patient.     Lobo Edwards

## 2018-02-05 NOTE — OP NOTE
[address]                  [address] [facility], [place]                                 OPERATIVE REPORT    PATIENT NAME: Bri Hilario                       :        1960  MED REC NO:   [id number]                          ROOM:  ACCOUNT NO:   [de-identified]                          ADMIT DATE: 2018  PROVIDER:     Verna Fabry, MD    DATE OF PROCEDURE:  2018    PREOPERATIVE DIAGNOSIS:  Advanced arthritis left hand, involving the left  index finger DIP joint. POSTOPERATIVE DIAGNOSIS:  Advanced arthritis left hand, involving the left  index finger DIP joint. PROCEDURE:  1. Left index finger DIP joint arthrodesis with implant. 2.  X-ray left index finger, three views with intraoperative  interpretation. ANESTHESIA:  General and local.    SURGEON:  Verna Fabry, MD    ASSISTANT:  Jyoti SOTO.    ESTIMATED BLOOD LOSS:  5 mL. COMPLICATIONS:  None. SPECIMEN:  None. DRAIN:  None. IMPLANT:  Med[employer]is compression screw, 36 mm. HISTORY OF PRESENT ILLNESS:  The patient is a pleasant gentleman with  advanced arthritis of the left index finger, most notably involving the DIP  joint. He had angulation, chronic pain, stiffness, and  weakness. There are no good salvage options surgically for his symptoms and  diagnosis. Therefore, we discussed arthrodesis of the left index finger to  stop the pain and position the finger in a better alignment for function. The patient understood that arthrodesis meant no longer any fusion at all  of the DIP joint. He desired to proceed. The left index finger DIP joint  was marked in preop holding by Dr. Dasia Ford and verified by the  patient. Informed consent was signed and on the chart. The risks and  benefits were outlined once again on the day of surgery.     OPERATIVE COURSE:  The patient was taken to operating room, placed in usual  supine position and general anesthesia was given. Left upper extremity was  prepped and draped in the normal sterile fashion. Antibiotic and DVT  prophylaxis was then placed and a formal time-out was held. Following  exsanguination, the tourniquet was inflated to 250 mmHg. An H-type incision was made dorsally over the left index finger DIP joint,  full thickness through skin, subcutaneous tissue and the extensor tendon. Collateral ligaments were released on either side of the DIP joint. There  was advanced arthritic change of the DIP joint. There was abnormal  angulation towards the middle finger. There was complete cartilaginous  loss and abnormal ridging of the distal interphalangeal joint surface. There were small loose bodies. Advanced arthritis was noted. Small  rongeur was used to remove the loose bodies. Small rongeur was used to  remove the dorsal osteophytes. At this point, small rongeur and small  curette were used to remove any remaining small areas of cartilage. This  was then used to remove the marble dense like bone surfaces to provide a  well-contoured subchondral bone surface for good fusion. Appropriate  contouring was undertaken to provide good bony contact. The DIP joint was  now straight on extension and coronally. Finger tip cascade was maintained  throughout. A small stab incision was made at the end of the finger. The  guidewire for the [employer]is compression screw was now placed in a distal to  proximal direction across the DIP joint in a center-center position. This  was confirmed with x-ray. A 36 mm screw was measured as most appropriate. Drill was used for the [facility] system, cannulated. The 36 mm compression screw was  placed over the guidewire at this point, with excellent compression. DIP  joint was well aligned still at this time including intact fingertip  cascade. Intraoperative x-ray revealed excellent compression at the DIP  joint, well-aligned joint with well-seated hardware. Final fluoroscopic  images were saved and printed. Once again, the fingertip cascade was  checked including the rotation of the finger. Passively, the finger was  placed into a palm, no scissoring. Wounds were copiously irrigated. No  other abnormalities were noted. Wounds were closed with interrupted nylon. Sterile dressings and a protective finger splint were applied. The patient  was awoken from anesthesia, tolerated the case well and taken to the  postanesthesia recovery in good condition. No complications. ADDENDUM:  Intraoperative x-ray of the left index finger was utilized  verifying arthrodesis, alignment, and placement of hardware. Three views  were saved and printed. Intraoperative interpretation by Dr. Ash Canseco. POSTOPERATIVE COURSE:  Follow up in the next 7 to 10 days for wound  inspection. Possible suture removal.  Hand therapy referral for a tip  protector to immobilize the DIP joint until fusion has taken. No gripping  or heavy impact activities until fusion has taken. PIP joint can be free.         Aysha NAIR MD    D: 02/05/2018 9:25:08       T: 02/05/2018 9:26:56     [id number]  Job#: [id number]     Doc#: [id number]    CC:

## 2018-02-05 NOTE — PROGRESS NOTES
Instructions reviewed with pt family. Verbalized understanding. To be discharged to home with family per wheelchair.

## 2018-02-06 DIAGNOSIS — I10 ESSENTIAL HYPERTENSION: ICD-10-CM

## 2018-02-06 RX ORDER — LISINOPRIL AND HYDROCHLOROTHIAZIDE 12.5; 1 MG/1; MG/1
TABLET ORAL
Qty: 90 TABLET | Refills: 1 | Status: SHIPPED | OUTPATIENT
Start: 2018-02-06 | End: 2018-02-20 | Stop reason: SDUPTHER

## 2018-02-07 ENCOUNTER — HOSPITAL ENCOUNTER (OUTPATIENT)
Dept: OCCUPATIONAL THERAPY | Age: 58
Discharge: HOME | End: 2018-02-08
Admitting: ORTHOPAEDIC SURGERY

## 2018-02-07 NOTE — PLAN OF CARE
diagnosis. Pain: 5/10    Objective Findings as appropriate:  ROM, strength, edema, wound/ scar appearance, function:  After removal of post op bandage with full finger alumifoam clamshell splint, revealed stitches on dorsum of left index DIP with one stitch at end of digit  Type of splint:   Clamshell thermoplastic splint to just distal to PIP joint over light xeroform, gauze and finger stockinette  Splint protocol utilization:   Full time except bandage changes  Splint Purpose: [x]Immobilize or protect [x]Promote healing of fusion   [x]Relieve pain  [x]Provide support for improved hand function []Maximize joint motion    Treatment:   [x]Splint provided ([x]Customized/ []Prefabricated), and splint rationale explained. [x]Patient instructed in [x]wear/ [x]care of splint and educated regarding diagnosis. []Patient instructed in symptom reduction techniques   []HEP instruction    []Discussed ADL assistive device    Written Information Distributed: []HEP  [x]Splint care and wearing protocol    Patient response to evaluation and instructions:  [x]Attentive/interested   [x]Asked questions/ retained info  []Appeared disinterested  []Poor retention of information  []Appeared anxious/ fearful    Assessment and Plan:  Goals: [x]Patient will be able to verbalize rationale for, and demonstrate proper wearing     of splint. [x]Splint will provide proper fit and function. []Patient will be able to verbalize 2-3 ways to prevent further symptoms. [x]Patient will be able to don and doff independently. []Patient will be independent with HEP    Goals met:  [x]yes []no    Plan:  [x]Splint completed with good fit and function. Hand Therapy to follow up for     splint modifications as needed    []Splint completed; OT/PT evaluation initiated. Patient to return for further     treatment.     Sunshine Nuñez, [address] [place] [address]

## 2018-02-08 ENCOUNTER — HOSPITAL ENCOUNTER (OUTPATIENT)
Dept: OCCUPATIONAL THERAPY | Age: 58
Discharge: HOME | End: 2018-01-31
Admitting: ORTHOPAEDIC SURGERY

## 2018-02-13 ENCOUNTER — OFFICE VISIT (OUTPATIENT)
Dept: ORTHOPEDIC SURGERY | Age: 58
End: 2018-02-13

## 2018-02-13 VITALS — HEIGHT: 70 IN | BODY MASS INDEX: 26.48 KG/M2 | WEIGHT: 185 LBS

## 2018-02-13 DIAGNOSIS — M79.645 FINGER PAIN, LEFT: Primary | ICD-10-CM

## 2018-02-13 DIAGNOSIS — M19.042 PRIMARY LOCALIZED OSTEOARTHROSIS OF LEFT HAND: ICD-10-CM

## 2018-02-13 PROCEDURE — 99024 POSTOP FOLLOW-UP VISIT: CPT | Performed by: ORTHOPAEDIC SURGERY

## 2018-02-13 NOTE — PROGRESS NOTES
laceration with bleeding. A dressing was placed. During voice stimulation, the patient did awake and became responsive. He was breathing the entire time. The pale color went away. He was not moved until stabilized by the paramedics and then moved onto a stretcher safely for transportation to the hospital for further evaluation and likely sutures of the laceration. Once coherent, he did admit that he had worked the overnight shift, he is tired, and he has not eaten in several hours. To the left index finger, steri-Strips were applied (as long as no allergy) to help prevent wound dehiscence, and Band-Aids were placed. Appropriate monitoring and care of the wound, including cleansing, was outlined with the patient today. We discussed appropriate activity limitations and modifications over the next couple weeks to prevent wound complication, such as dehiscence. The patient understands to contact my office if having wound problems. These would include, but not limited to, erythema, new swelling or pain, dehiscence, signs of infection. He was unable to go to therapy today for a tip protector adjustment. This will be arranged once the current situation is taking care of. Okay for PIP and MCP joint motion. No attempted motion DIP joint. No heavy gripping, lifting or impact activities involving the left index finger until fusion has occurred. This was all explained. Unless needed sooner, he will follow up in 8 weeks. Repeat x-ray left index finger at that time. Regarding the head trauma today, we will contact the patient in the next couple days to assure he is improved. We'll be happy to see him if needed, prior to his next finger follow-up. All questions and concerns were addressed today. Patient is in agreement with the plan.         Neptali Osborn MD  Hand & Upper Extremity Surgery  [address] [facility] [place] [facility] ([facility])

## 2018-02-14 ENCOUNTER — TELEPHONE (OUTPATIENT)
Dept: ORTHOPEDIC SURGERY | Age: 58
End: 2018-02-14

## 2018-02-19 ENCOUNTER — TELEPHONE (OUTPATIENT)
Dept: ORTHOPEDIC SURGERY | Age: 58
End: 2018-02-19

## 2018-02-20 ENCOUNTER — OFFICE VISIT (OUTPATIENT)
Dept: FAMILY MEDICINE CLINIC | Age: 58
End: 2018-02-20

## 2018-02-20 VITALS
HEART RATE: 91 BPM | DIASTOLIC BLOOD PRESSURE: 80 MMHG | OXYGEN SATURATION: 98 % | WEIGHT: 182.4 LBS | BODY MASS INDEX: 26.17 KG/M2 | SYSTOLIC BLOOD PRESSURE: 122 MMHG

## 2018-02-20 DIAGNOSIS — I10 ESSENTIAL HYPERTENSION: ICD-10-CM

## 2018-02-20 DIAGNOSIS — Z48.02 VISIT FOR SUTURE REMOVAL: Primary | ICD-10-CM

## 2018-02-20 DIAGNOSIS — N52.9 ERECTILE DYSFUNCTION, UNSPECIFIED ERECTILE DYSFUNCTION TYPE: ICD-10-CM

## 2018-02-20 PROCEDURE — 99214 OFFICE O/P EST MOD 30 MIN: CPT | Performed by: NURSE PRACTITIONER

## 2018-02-20 RX ORDER — LISINOPRIL AND HYDROCHLOROTHIAZIDE 12.5; 1 MG/1; MG/1
TABLET ORAL
Qty: 90 TABLET | Refills: 1 | Status: CANCELLED | OUTPATIENT
Start: 2018-02-20

## 2018-02-20 RX ORDER — TADALAFIL 10 MG/1
10 TABLET ORAL PRN
Qty: 10 TABLET | Refills: 5 | Status: SHIPPED | OUTPATIENT
Start: 2018-02-20 | End: 2019-02-14 | Stop reason: ALTCHOICE

## 2018-02-20 RX ORDER — LISINOPRIL AND HYDROCHLOROTHIAZIDE 12.5; 1 MG/1; MG/1
TABLET ORAL
Qty: 90 TABLET | Refills: 1 | Status: SHIPPED | OUTPATIENT
Start: 2018-02-20 | End: 2018-08-14 | Stop reason: SDUPTHER

## 2018-02-20 ASSESSMENT — ENCOUNTER SYMPTOMS
COLOR CHANGE: 0
RESPIRATORY NEGATIVE: 1

## 2018-02-20 NOTE — PROGRESS NOTES
[facility] SPECIALTY [facility]  [place] [address]  Dept: [phone]  Dept Fax: [phone]    Jovanni Chin is a 56 y.o. male who presents today for his medical conditions/complaints as noted below. Jovanni Chin is c/o of Suture / Staple Removal (Pt presents today for suture removal on the right side of his head by his eyebrow )        HPI:     Chief Complaint   Patient presents with    Suture / Staple Removal     Pt presents today for suture removal on the right side of his head by his eyebrow        HPI    Suture Removal  Patient here for suture removal. he obtained a laceration above right eyebrow 7 days ago. He was seen at [address] [facility]. He had 3 suture (s). Mechanism of injury: Fall. His  last tetanus  was about 2 months ago. He is asking for his blood pressure medication to be refilled. He is asking for his cialis to be refilled for his erectile dysfunction. He states he is doing well on his medications and denies any side effects.       Past Medical History:   Diagnosis Date    Erectile dysfunction     GERD (gastroesophageal reflux disease)     Hypertension     Hypertriglyceridemia 1/10/2018    Hypothyroidism     Low testosterone     Recurrent kidney stones     Tobacco abuse 1/10/2018      Past Surgical History:   Procedure Laterality Date    APPENDECTOMY      FINGER SURGERY      HERNIA REPAIR      bilateral    LITHOTRIPSY      VASECTOMY         Family History   Problem Relation Age of Onset    Cancer Mother 79     breast     Arthritis Mother     High Blood Pressure Father     Stroke Father     Heart Attack Father 68    Heart Disease Father     High Cholesterol Father     High Blood Pressure Brother     Other Brother 27     Testicular Cancer     Other Sister      Hypothyrodism    Arthritis Sister     Diabetes Son     Other Son      Hypothyroidism       Social History   Substance Use Topics    Smoking status: Former Smoker     Packs/day: 0.50     Years: 15.00     Types: Cigarettes     Quit date: 1/22/2018    Smokeless tobacco: Never Used    Alcohol use Yes      Comment: weekly (1 drink of bourbon in a sitting)       Current Outpatient Prescriptions   Medication Sig Dispense Refill    tadalafil (CIALIS) 10 MG tablet Take 1 tablet by mouth as needed for Erectile Dysfunction 10 tablet 5    lisinopril-hydrochlorothiazide (PRINZIDE;ZESTORETIC) 10-12.5 MG per tablet TAKE 1 TABLET DAILY 90 tablet 1    thyroid (ARMOUR THYROID) 90 MG tablet Take 1 tablet by mouth daily 90 tablet 1    thyroid (ARMOUR) 30 MG tablet Take 1 tablet by mouth daily 90 tablet 1    Testosterone (ANDROGEL PUMP) 20.25 MG/ACT (1.62%) GEL gel APPLY 2 PUMP ACTUATIONS (40.5 MG/2.5 GM) TOPICALLY ONCE DAILY IN THE MORNING TO THE SHOULDERS AND UPPER ARMS 75 g 2    NEXIUM 20 MG capsule TAKE ONE CAPSULE BY MOUTH DAILY 30 capsule 0    Cholecalciferol (VITAMIN D3) 3000 UNITS TABS Take  by mouth.  magnesium 30 MG tablet Take 250 mg by mouth 2 times daily. No current facility-administered medications for this visit. Allergies   Allergen Reactions    Atorvastatin      Myalgias     Protonix [Pantoprazole Sodium] Hives       Subjective:      Review of Systems   Constitutional: Negative. Respiratory: Negative. Cardiovascular: Negative. Skin: Positive for wound (Above right eyebrow). Negative for color change, pallor and rash.          Objective:     Vitals:    02/20/18 1447   BP: 122/80   Site: Left Arm   Position: Sitting   Cuff Size: Large Adult   Pulse: 91   SpO2: 98%   Weight: 182 lb 6.4 oz (82.7 kg)     Wt Readings from Last 3 Encounters:   02/20/18 182 lb 6.4 oz (82.7 kg)   02/13/18 185 lb (83.9 kg)   02/05/18 180 lb (81.6 kg)     Temp Readings from Last 3 Encounters:   02/13/18 98.3 °F (36.8 °C) (Oral)   02/05/18 98.2 °F (36.8 °C)   08/29/17 98.2 °F (36.8 °C) (Oral)     BP Readings from Last 3 Encounters:   02/20/18 122/80   02/13/18 (!) 138/94   02/05/18 (!) 143/100     Pulse Readings from Last 3 Encounters:   02/20/18 91   02/13/18 86   02/05/18 85     Physical Exam   Constitutional: He is oriented to person, place, and time. He appears well-developed and well-nourished. No distress. HENT:   Head: Normocephalic and atraumatic. Right Ear: External ear normal.   Left Ear: External ear normal.   Nose: Nose normal.   Eyes: Conjunctivae and EOM are normal. Right eye exhibits no discharge. Left eye exhibits no discharge. Neck: Normal range of motion. Cardiovascular: Normal rate. Pulmonary/Chest: Effort normal.   Abdominal: Soft. Musculoskeletal: Normal range of motion. Neurological: He is alert and oriented to person, place, and time. Skin: Skin is warm and dry. He is not diaphoretic. Psychiatric: He has a normal mood and affect. His behavior is normal. Judgment and thought content normal.   Nursing note and vitals reviewed. Admission on 02/13/2018, Discharged on 02/13/2018   Component Date Value Ref Range Status    Ventricular Rate 02/15/2018 86  BPM Final    Atrial Rate 02/15/2018 86  BPM Final    P-R Interval 02/15/2018 144  ms Final    QRS Duration 02/15/2018 94  ms Final    Q-T Interval 02/15/2018 390  ms Final    QTc Calculation (Bazett) 02/15/2018 466  ms Final    P Axis 02/15/2018 55  degrees Final    R Axis 02/15/2018 43  degrees Final    T Axis 02/15/2018 36  degrees Final    Diagnosis 02/15/2018    Final                    Value:Normal sinus rhythm  Normal ECG  No previous ECGs available  Confirmed by Marleny Baker ([id number]) on 2/13/2018 10:43:33 PM      Glucose 02/13/2018 92  mg/dL Final    POC Glucose 02/13/2018 92  70 - 99 mg/dl Final    Performed on 02/13/2018 ACCU-CHEK   Final           Assessment & Plan: The following diagnoses and conditions are stable with no further orders unless indicated:  1. Visit for suture removal    2. Essential hypertension    3.  Erectile dysfunction, unspecified erectile dysfunction type        Hussein Rajan was seen today for suture / staple removal.    3 sutures removed. Wound closed and no signs or symptoms of infection noted. Wound covered with a band-aid. Informed Mr. Joyce Boyd he may use mederma or vitamin E to prevent scarring. He verbalizes understanding. Diagnoses and all orders for this visit:    Visit for suture removal    Essential hypertension  -     lisinopril-hydrochlorothiazide (PRINZIDE;ZESTORETIC) 10-12.5 MG per tablet; TAKE 1 TABLET DAILY    Erectile dysfunction, unspecified erectile dysfunction type  -     tadalafil (CIALIS) 10 MG tablet; Take 1 tablet by mouth as needed for Erectile Dysfunction      Prior to Visit Medications    Medication Sig Taking? Authorizing Provider   tadalafil (CIALIS) 10 MG tablet Take 1 tablet by mouth as needed for Erectile Dysfunction Yes Echo Candelario CNP   lisinopril-hydrochlorothiazide (PRINZIDE;ZESTORETIC) 10-12.5 MG per tablet TAKE 1 TABLET DAILY Yes Echo Candelario CNP   thyroid (ARMOUR THYROID) 90 MG tablet Take 1 tablet by mouth daily Yes Echo Candelario CNP   thyroid (ARMOUR) 30 MG tablet Take 1 tablet by mouth daily Yes Echo Candelario CNP   Testosterone (ANDROGEL PUMP) 20.25 MG/ACT (1.62%) GEL gel APPLY 2 PUMP ACTUATIONS (40.5 MG/2.5 GM) TOPICALLY ONCE DAILY IN THE MORNING TO THE SHOULDERS AND UPPER ARMS Yes Deb Jim,    NEXIUM 20 MG capsule TAKE ONE CAPSULE BY MOUTH DAILY Yes Deb Jim, DO   Cholecalciferol (VITAMIN D3) 3000 UNITS TABS Take  by mouth. Yes Historical Provider, MD   magnesium 30 MG tablet Take 250 mg by mouth 2 times daily.  Yes Historical Provider, MD     Orders Placed This Encounter   Medications    tadalafil (CIALIS) 10 MG tablet     Sig: Take 1 tablet by mouth as needed for Erectile Dysfunction     Dispense:  10 tablet     Refill:  5    lisinopril-hydrochlorothiazide (PRINZIDE;ZESTORETIC) 10-12.5 MG per tablet     Sig: TAKE 1 TABLET DAILY     Dispense:  90 tablet

## 2018-03-01 ENCOUNTER — HOSPITAL ENCOUNTER (OUTPATIENT)
Dept: OCCUPATIONAL THERAPY | Age: 58
Discharge: HOME | End: 2018-03-31
Attending: ORTHOPAEDIC SURGERY | Admitting: ORTHOPAEDIC SURGERY

## 2018-08-14 DIAGNOSIS — I10 ESSENTIAL HYPERTENSION: ICD-10-CM

## 2018-08-14 DIAGNOSIS — E03.8 OTHER SPECIFIED HYPOTHYROIDISM: ICD-10-CM

## 2018-08-14 RX ORDER — LISINOPRIL AND HYDROCHLOROTHIAZIDE 12.5; 1 MG/1; MG/1
TABLET ORAL
Qty: 90 TABLET | Refills: 1 | Status: SHIPPED | OUTPATIENT
Start: 2018-08-14 | End: 2019-02-25 | Stop reason: SDUPTHER

## 2018-08-14 RX ORDER — THYROID 30 MG/1
TABLET ORAL
Qty: 90 TABLET | Refills: 1 | Status: SHIPPED | OUTPATIENT
Start: 2018-08-14 | End: 2019-02-14 | Stop reason: SDUPTHER

## 2018-08-14 RX ORDER — THYROID,PORK 90 MG
TABLET ORAL
Qty: 90 TABLET | Refills: 1 | Status: SHIPPED | OUTPATIENT
Start: 2018-08-14 | End: 2019-02-14 | Stop reason: SDUPTHER

## 2018-09-07 DIAGNOSIS — N52.9 ERECTILE DYSFUNCTION, UNSPECIFIED ERECTILE DYSFUNCTION TYPE: ICD-10-CM

## 2018-09-07 NOTE — TELEPHONE ENCOUNTER
Pt is asking if you would fill his cialis his former doctor use to fill this but now needs a refill   Claire Kidney is requesting refill(s)   Last OV 2/20/18 (pertaining to medication)  LR 2/20/18 (per medication requested)  Next office visit scheduled or attempted No   If no, reason:

## 2018-09-10 RX ORDER — SILDENAFIL 50 MG/1
50 TABLET, FILM COATED ORAL PRN
Qty: 10 TABLET | Refills: 1 | Status: SHIPPED | OUTPATIENT
Start: 2018-09-10 | End: 2018-09-12 | Stop reason: SDUPTHER

## 2018-09-10 RX ORDER — TADALAFIL 10 MG/1
10 TABLET ORAL PRN
Qty: 10 TABLET | Refills: 5 | Status: CANCELLED | OUTPATIENT
Start: 2018-09-10

## 2018-09-12 ENCOUNTER — TELEPHONE (OUTPATIENT)
Dept: FAMILY MEDICINE CLINIC | Age: 58
End: 2018-09-12

## 2018-09-12 DIAGNOSIS — N52.9 ERECTILE DYSFUNCTION, UNSPECIFIED ERECTILE DYSFUNCTION TYPE: ICD-10-CM

## 2018-09-12 RX ORDER — SILDENAFIL 50 MG/1
50 TABLET, FILM COATED ORAL PRN
Qty: 10 TABLET | Refills: 1 | Status: SHIPPED | OUTPATIENT
Start: 2018-09-12 | End: 2019-02-27 | Stop reason: SDUPTHER

## 2018-09-12 NOTE — TELEPHONE ENCOUNTER
Pt called asking if the script for viagra that was ordered Friday could be sent to [facility] instead.

## 2019-02-14 ENCOUNTER — OFFICE VISIT (OUTPATIENT)
Dept: FAMILY MEDICINE CLINIC | Age: 59
End: 2019-02-14
Payer: COMMERCIAL

## 2019-02-14 VITALS
WEIGHT: 194 LBS | SYSTOLIC BLOOD PRESSURE: 130 MMHG | OXYGEN SATURATION: 97 % | HEART RATE: 87 BPM | DIASTOLIC BLOOD PRESSURE: 88 MMHG | BODY MASS INDEX: 27.77 KG/M2 | HEIGHT: 70 IN

## 2019-02-14 DIAGNOSIS — M79.675 PAIN AND SWELLING OF TOE OF LEFT FOOT: ICD-10-CM

## 2019-02-14 DIAGNOSIS — E78.2 MIXED HYPERLIPIDEMIA: ICD-10-CM

## 2019-02-14 DIAGNOSIS — Z13.21 ENCOUNTER FOR VITAMIN DEFICIENCY SCREENING: ICD-10-CM

## 2019-02-14 DIAGNOSIS — E03.8 OTHER SPECIFIED HYPOTHYROIDISM: Primary | ICD-10-CM

## 2019-02-14 DIAGNOSIS — R29.818 SUSPECTED SLEEP APNEA: ICD-10-CM

## 2019-02-14 DIAGNOSIS — Z12.11 SCREENING FOR COLON CANCER: ICD-10-CM

## 2019-02-14 DIAGNOSIS — M79.89 PAIN AND SWELLING OF TOE OF LEFT FOOT: ICD-10-CM

## 2019-02-14 DIAGNOSIS — I10 ESSENTIAL HYPERTENSION: ICD-10-CM

## 2019-02-14 DIAGNOSIS — Z12.5 SCREENING FOR PROSTATE CANCER: ICD-10-CM

## 2019-02-14 LAB
A/G RATIO: 1.5 (ref 1.1–2.2)
ALBUMIN SERPL-MCNC: 4.4 G/DL (ref 3.4–5)
ALP BLD-CCNC: 108 U/L (ref 40–129)
ALT SERPL-CCNC: 39 U/L (ref 10–40)
ANION GAP SERPL CALCULATED.3IONS-SCNC: 12 MMOL/L (ref 3–16)
AST SERPL-CCNC: 30 U/L (ref 15–37)
BILIRUB SERPL-MCNC: 0.8 MG/DL (ref 0–1)
BUN BLDV-MCNC: 15 MG/DL (ref 7–20)
CALCIUM SERPL-MCNC: 10.1 MG/DL (ref 8.3–10.6)
CHLORIDE BLD-SCNC: 96 MMOL/L (ref 99–110)
CHOLESTEROL, TOTAL: 269 MG/DL (ref 0–199)
CO2: 24 MMOL/L (ref 21–32)
CREAT SERPL-MCNC: 1 MG/DL (ref 0.9–1.3)
GFR AFRICAN AMERICAN: >60
GFR NON-AFRICAN AMERICAN: >60
GLOBULIN: 3 G/DL
GLUCOSE BLD-MCNC: 94 MG/DL (ref 70–99)
HDLC SERPL-MCNC: 57 MG/DL (ref 40–60)
LDL CHOLESTEROL CALCULATED: 189 MG/DL
POTASSIUM SERPL-SCNC: 4.1 MMOL/L (ref 3.5–5.1)
SODIUM BLD-SCNC: 132 MMOL/L (ref 136–145)
TOTAL PROTEIN: 7.4 G/DL (ref 6.4–8.2)
TRIGL SERPL-MCNC: 117 MG/DL (ref 0–150)
URIC ACID, SERUM: 8.6 MG/DL (ref 3.5–7.2)
VLDLC SERPL CALC-MCNC: 23 MG/DL

## 2019-02-14 PROCEDURE — 99215 OFFICE O/P EST HI 40 MIN: CPT | Performed by: NURSE PRACTITIONER

## 2019-02-14 RX ORDER — LEVOTHYROXINE AND LIOTHYRONINE 76; 18 UG/1; UG/1
120 TABLET ORAL DAILY
Qty: 90 TABLET | Refills: 1 | Status: SHIPPED | OUTPATIENT
Start: 2019-02-14 | End: 2019-09-06 | Stop reason: SDUPTHER

## 2019-02-14 RX ORDER — METHYLPREDNISOLONE 4 MG/1
TABLET ORAL
Qty: 1 KIT | Refills: 0 | Status: SHIPPED | OUTPATIENT
Start: 2019-02-14 | End: 2019-02-20

## 2019-02-14 ASSESSMENT — ENCOUNTER SYMPTOMS
CHEST TIGHTNESS: 0
DIARRHEA: 0
EYE PAIN: 0
CHOKING: 0
STRIDOR: 0
COLOR CHANGE: 0
ALLERGIC/IMMUNOLOGIC NEGATIVE: 1
GASTROINTESTINAL NEGATIVE: 1
BACK PAIN: 0
EYE REDNESS: 0
COUGH: 0
PHOTOPHOBIA: 0
RHINORRHEA: 0
APNEA: 0
VOMITING: 0
ABDOMINAL PAIN: 0
BLOOD IN STOOL: 0
SINUS PRESSURE: 0
RESPIRATORY NEGATIVE: 1
SORE THROAT: 0
EYE ITCHING: 0
WHEEZING: 0
EYE DISCHARGE: 0
TROUBLE SWALLOWING: 0
NAUSEA: 0
SHORTNESS OF BREATH: 0
CONSTIPATION: 0

## 2019-02-15 DIAGNOSIS — E87.1 HYPONATREMIA: Primary | ICD-10-CM

## 2019-02-15 DIAGNOSIS — M10.09 ACUTE IDIOPATHIC GOUT OF MULTIPLE SITES: ICD-10-CM

## 2019-02-15 DIAGNOSIS — E78.00 ELEVATED LDL CHOLESTEROL LEVEL: ICD-10-CM

## 2019-02-15 LAB
BASOPHILS ABSOLUTE: 0 K/UL (ref 0–0.2)
BASOPHILS RELATIVE PERCENT: 0.4 %
EOSINOPHILS ABSOLUTE: 0.2 K/UL (ref 0–0.6)
EOSINOPHILS RELATIVE PERCENT: 3.6 %
FOLATE: 4.92 NG/ML (ref 4.78–24.2)
HCT VFR BLD CALC: 47.6 % (ref 40.5–52.5)
HEMOGLOBIN: 16.6 G/DL (ref 13.5–17.5)
LYMPHOCYTES ABSOLUTE: 1.9 K/UL (ref 1–5.1)
LYMPHOCYTES RELATIVE PERCENT: 41.9 %
MCH RBC QN AUTO: 34.3 PG (ref 26–34)
MCHC RBC AUTO-ENTMCNC: 34.9 G/DL (ref 31–36)
MCV RBC AUTO: 98.3 FL (ref 80–100)
MONOCYTES ABSOLUTE: 0.4 K/UL (ref 0–1.3)
MONOCYTES RELATIVE PERCENT: 8 %
NEUTROPHILS ABSOLUTE: 2.1 K/UL (ref 1.7–7.7)
NEUTROPHILS RELATIVE PERCENT: 46.1 %
PDW BLD-RTO: 13.3 % (ref 12.4–15.4)
PLATELET # BLD: 247 K/UL (ref 135–450)
PMV BLD AUTO: 8.6 FL (ref 5–10.5)
PROSTATE SPECIFIC ANTIGEN: 0.91 NG/ML (ref 0–4)
RBC # BLD: 4.84 M/UL (ref 4.2–5.9)
T3 FREE: 7.9 PG/ML (ref 2.3–4.2)
T4 FREE: 1.1 NG/DL (ref 0.9–1.8)
TSH REFLEX: 0.52 UIU/ML (ref 0.27–4.2)
VITAMIN B-12: 590 PG/ML (ref 211–911)
VITAMIN D 25-HYDROXY: 33.7 NG/ML
WBC # BLD: 4.5 K/UL (ref 4–11)

## 2019-02-15 RX ORDER — ROSUVASTATIN CALCIUM 5 MG/1
5 TABLET, COATED ORAL NIGHTLY
Qty: 90 TABLET | Refills: 0 | Status: SHIPPED | OUTPATIENT
Start: 2019-02-15 | End: 2019-06-10

## 2019-02-25 DIAGNOSIS — I10 ESSENTIAL HYPERTENSION: ICD-10-CM

## 2019-02-26 RX ORDER — LISINOPRIL AND HYDROCHLOROTHIAZIDE 12.5; 1 MG/1; MG/1
TABLET ORAL
Qty: 90 TABLET | Refills: 1 | Status: SHIPPED | OUTPATIENT
Start: 2019-02-26 | End: 2019-06-06

## 2019-02-27 DIAGNOSIS — N52.9 ERECTILE DYSFUNCTION, UNSPECIFIED ERECTILE DYSFUNCTION TYPE: ICD-10-CM

## 2019-02-27 RX ORDER — SILDENAFIL 50 MG/1
TABLET, FILM COATED ORAL
Qty: 10 TABLET | Refills: 1 | Status: SHIPPED | OUTPATIENT
Start: 2019-02-27 | End: 2019-07-09 | Stop reason: SDUPTHER

## 2019-06-06 RX ORDER — LISINOPRIL 10 MG/1
10 TABLET ORAL DAILY
Qty: 30 TABLET | Refills: 0 | Status: SHIPPED | OUTPATIENT
Start: 2019-06-06 | End: 2019-07-06 | Stop reason: SDUPTHER

## 2019-06-06 NOTE — TELEPHONE ENCOUNTER
Called pt and informed. Pt stated he's already on the Lisinopril. Pt will be in tomorrow morning for blood work.

## 2019-06-06 NOTE — TELEPHONE ENCOUNTER
I would have him stop the BP medication he is on and just go on Lisinopril. His sodium was low in February and he was suppose to come back for BMP recheck.   Recommend him come into the office for BMP, HR and BP check, and UA

## 2019-06-06 NOTE — TELEPHONE ENCOUNTER
Pt called and stated that his BP has been running low(about 1 month) on the medication. States that he quit smoking last year and has been eating better. Pt stated that he's been taking half of his normal dose of BP medication for about 1-2 weeks and it seems to be working a lot better. Pt also stated that about the time his BP started running low he also started urinating more frequently. Pt stated his BP is doing better, but he continues to have frequent urination. Pt stated that he's off work today and tomorrow if PCP would like him to come in for some blood work. Call back [phone] with recommendations.

## 2019-06-07 ENCOUNTER — HOSPITAL ENCOUNTER (OUTPATIENT)
Age: 59
Discharge: HOME | End: 2019-06-07
Payer: COMMERCIAL

## 2019-06-07 DIAGNOSIS — E78.00 ELEVATED LDL CHOLESTEROL LEVEL: ICD-10-CM

## 2019-06-07 DIAGNOSIS — M10.09 ACUTE IDIOPATHIC GOUT OF MULTIPLE SITES: ICD-10-CM

## 2019-06-07 LAB
A/G RATIO: 1.3 (ref 1.1–2.2)
ALBUMIN SERPL-MCNC: 4.1 G/DL (ref 3.4–5)
ALP BLD-CCNC: 111 U/L (ref 40–129)
ALT SERPL-CCNC: 28 U/L (ref 10–40)
ANION GAP SERPL CALCULATED.3IONS-SCNC: 13 MMOL/L (ref 3–16)
AST SERPL-CCNC: 25 U/L (ref 15–37)
BILIRUB SERPL-MCNC: 0.6 MG/DL (ref 0–1)
BUN BLDV-MCNC: 19 MG/DL (ref 7–20)
CALCIUM SERPL-MCNC: 10.1 MG/DL (ref 8.3–10.6)
CHLORIDE BLD-SCNC: 103 MMOL/L (ref 99–110)
CHOLESTEROL, TOTAL: 253 MG/DL (ref 0–199)
CO2: 25 MMOL/L (ref 21–32)
CREAT SERPL-MCNC: 1.1 MG/DL (ref 0.9–1.3)
GFR AFRICAN AMERICAN: >60
GFR NON-AFRICAN AMERICAN: >60
GLOBULIN: 3.1 G/DL
GLUCOSE BLD-MCNC: 96 MG/DL (ref 70–99)
HDLC SERPL-MCNC: 47 MG/DL (ref 40–60)
LDL CHOLESTEROL CALCULATED: 174 MG/DL
POTASSIUM SERPL-SCNC: 4.2 MMOL/L (ref 3.5–5.1)
SODIUM BLD-SCNC: 141 MMOL/L (ref 136–145)
TOTAL CK: 91 U/L (ref 39–308)
TOTAL PROTEIN: 7.2 G/DL (ref 6.4–8.2)
TRIGL SERPL-MCNC: 159 MG/DL (ref 0–150)
URIC ACID, SERUM: 9.4 MG/DL (ref 3.5–7.2)
VLDLC SERPL CALC-MCNC: 32 MG/DL

## 2019-06-07 PROCEDURE — 80061 LIPID PANEL: CPT

## 2019-06-07 PROCEDURE — 80053 COMPREHEN METABOLIC PANEL: CPT

## 2019-06-07 PROCEDURE — 84550 ASSAY OF BLOOD/URIC ACID: CPT

## 2019-06-07 PROCEDURE — 36415 COLL VENOUS BLD VENIPUNCTURE: CPT

## 2019-06-07 PROCEDURE — 82550 ASSAY OF CK (CPK): CPT

## 2019-06-10 DIAGNOSIS — E79.0 ELEVATED URIC ACID IN BLOOD: Primary | ICD-10-CM

## 2019-06-10 DIAGNOSIS — E79.0 ELEVATED BLOOD URIC ACID LEVEL: Primary | ICD-10-CM

## 2019-06-10 RX ORDER — ALLOPURINOL 100 MG/1
100 TABLET ORAL DAILY
Qty: 30 TABLET | Refills: 2 | Status: SHIPPED | OUTPATIENT
Start: 2019-06-10 | End: 2019-08-20 | Stop reason: DRUGHIGH

## 2019-07-09 DIAGNOSIS — N52.9 ERECTILE DYSFUNCTION, UNSPECIFIED ERECTILE DYSFUNCTION TYPE: ICD-10-CM

## 2019-07-09 RX ORDER — SILDENAFIL 50 MG/1
50 TABLET, FILM COATED ORAL DAILY PRN
Qty: 10 TABLET | Refills: 1 | Status: SHIPPED | OUTPATIENT
Start: 2019-07-09 | End: 2019-11-13 | Stop reason: SDUPTHER

## 2019-08-14 ENCOUNTER — NURSE ONLY (OUTPATIENT)
Dept: FAMILY MEDICINE CLINIC | Age: 59
End: 2019-08-14
Payer: COMMERCIAL

## 2019-08-14 DIAGNOSIS — E79.0 ELEVATED BLOOD URIC ACID LEVEL: Primary | ICD-10-CM

## 2019-08-14 PROCEDURE — 36415 COLL VENOUS BLD VENIPUNCTURE: CPT | Performed by: NURSE PRACTITIONER

## 2019-08-15 DIAGNOSIS — E79.0 ELEVATED URIC ACID IN BLOOD: ICD-10-CM

## 2019-08-15 DIAGNOSIS — M10.09 ACUTE IDIOPATHIC GOUT OF MULTIPLE SITES: Primary | ICD-10-CM

## 2019-08-15 LAB — URIC ACID, SERUM: 7.7 MG/DL (ref 3.5–7.2)

## 2019-08-20 RX ORDER — ALLOPURINOL 100 MG/1
100 TABLET ORAL 2 TIMES DAILY
Qty: 60 TABLET | Refills: 2 | Status: SHIPPED | OUTPATIENT
Start: 2019-08-20 | End: 2020-01-30 | Stop reason: SINTOL

## 2019-09-06 DIAGNOSIS — E03.8 OTHER SPECIFIED HYPOTHYROIDISM: ICD-10-CM

## 2019-09-06 RX ORDER — THYROID, PORCINE 120 MG/1
TABLET ORAL
Qty: 90 TABLET | Refills: 4 | Status: SHIPPED | OUTPATIENT
Start: 2019-09-06 | End: 2020-03-12 | Stop reason: SDUPTHER

## 2019-12-16 RX ORDER — LISINOPRIL 10 MG/1
10 TABLET ORAL DAILY
Qty: 90 TABLET | Refills: 0 | Status: SHIPPED | OUTPATIENT
Start: 2019-12-16 | End: 2020-03-15

## 2020-01-17 RX ORDER — SILDENAFIL 50 MG/1
50 TABLET, FILM COATED ORAL PRN
Qty: 10 TABLET | Refills: 0 | Status: SHIPPED | OUTPATIENT
Start: 2020-01-17 | End: 2020-03-21 | Stop reason: SDUPTHER

## 2020-01-30 ENCOUNTER — OFFICE VISIT (OUTPATIENT)
Dept: FAMILY MEDICINE CLINIC | Age: 60
End: 2020-01-30
Payer: COMMERCIAL

## 2020-01-30 VITALS
HEIGHT: 70 IN | SYSTOLIC BLOOD PRESSURE: 138 MMHG | DIASTOLIC BLOOD PRESSURE: 96 MMHG | HEART RATE: 72 BPM | BODY MASS INDEX: 27.49 KG/M2 | WEIGHT: 192 LBS | OXYGEN SATURATION: 98 %

## 2020-01-30 LAB
A/G RATIO: 1.5 (ref 1.1–2.2)
ALBUMIN SERPL-MCNC: 4.6 G/DL (ref 3.4–5)
ALP BLD-CCNC: 174 U/L (ref 40–129)
ALT SERPL-CCNC: 33 U/L (ref 10–40)
ANION GAP SERPL CALCULATED.3IONS-SCNC: 11 MMOL/L (ref 3–16)
AST SERPL-CCNC: 24 U/L (ref 15–37)
BASOPHILS ABSOLUTE: 0 K/UL (ref 0–0.2)
BASOPHILS RELATIVE PERCENT: 0.2 %
BILIRUB SERPL-MCNC: 0.7 MG/DL (ref 0–1)
BUN BLDV-MCNC: 16 MG/DL (ref 7–20)
CALCIUM SERPL-MCNC: 10.2 MG/DL (ref 8.3–10.6)
CHLORIDE BLD-SCNC: 98 MMOL/L (ref 99–110)
CHOLESTEROL, TOTAL: 254 MG/DL (ref 0–199)
CO2: 28 MMOL/L (ref 21–32)
CREAT SERPL-MCNC: 0.9 MG/DL (ref 0.9–1.3)
EOSINOPHILS ABSOLUTE: 0.1 K/UL (ref 0–0.6)
EOSINOPHILS RELATIVE PERCENT: 2.4 %
FOLATE: 11.93 NG/ML (ref 4.78–24.2)
GFR AFRICAN AMERICAN: >60
GFR NON-AFRICAN AMERICAN: >60
GLOBULIN: 3.1 G/DL
GLUCOSE BLD-MCNC: 89 MG/DL (ref 70–99)
HCT VFR BLD CALC: 48.1 % (ref 40.5–52.5)
HDLC SERPL-MCNC: 48 MG/DL (ref 40–60)
HEMOGLOBIN: 16.5 G/DL (ref 13.5–17.5)
LDL CHOLESTEROL CALCULATED: 156 MG/DL
LYMPHOCYTES ABSOLUTE: 2 K/UL (ref 1–5.1)
LYMPHOCYTES RELATIVE PERCENT: 34.6 %
MCH RBC QN AUTO: 32.6 PG (ref 26–34)
MCHC RBC AUTO-ENTMCNC: 34.3 G/DL (ref 31–36)
MCV RBC AUTO: 94.9 FL (ref 80–100)
MONOCYTES ABSOLUTE: 0.4 K/UL (ref 0–1.3)
MONOCYTES RELATIVE PERCENT: 7.3 %
NEUTROPHILS ABSOLUTE: 3.3 K/UL (ref 1.7–7.7)
NEUTROPHILS RELATIVE PERCENT: 55.5 %
PDW BLD-RTO: 13.6 % (ref 12.4–15.4)
PLATELET # BLD: 241 K/UL (ref 135–450)
PMV BLD AUTO: 8.5 FL (ref 5–10.5)
POTASSIUM SERPL-SCNC: 5 MMOL/L (ref 3.5–5.1)
RBC # BLD: 5.07 M/UL (ref 4.2–5.9)
SODIUM BLD-SCNC: 137 MMOL/L (ref 136–145)
T3 FREE: 3.3 PG/ML (ref 2.3–4.2)
T4 FREE: 0.9 NG/DL (ref 0.9–1.8)
TOTAL PROTEIN: 7.7 G/DL (ref 6.4–8.2)
TRIGL SERPL-MCNC: 248 MG/DL (ref 0–150)
TSH SERPL DL<=0.05 MIU/L-ACNC: 1.08 UIU/ML (ref 0.27–4.2)
URIC ACID, SERUM: 7.7 MG/DL (ref 3.5–7.2)
VITAMIN B-12: 461 PG/ML (ref 211–911)
VITAMIN D 25-HYDROXY: 34.4 NG/ML
VLDLC SERPL CALC-MCNC: 50 MG/DL
WBC # BLD: 5.9 K/UL (ref 4–11)

## 2020-01-30 PROCEDURE — 99214 OFFICE O/P EST MOD 30 MIN: CPT | Performed by: NURSE PRACTITIONER

## 2020-01-30 PROCEDURE — 99396 PREV VISIT EST AGE 40-64: CPT | Performed by: NURSE PRACTITIONER

## 2020-01-30 RX ORDER — PREDNISONE 10 MG/1
TABLET ORAL
Qty: 30 TABLET | Refills: 0 | Status: SHIPPED | OUTPATIENT
Start: 2020-01-30 | End: 2020-03-11 | Stop reason: ALTCHOICE

## 2020-01-30 RX ORDER — HYDROCHLOROTHIAZIDE 25 MG/1
25 TABLET ORAL EVERY MORNING
Qty: 90 TABLET | Refills: 1 | Status: SHIPPED | OUTPATIENT
Start: 2020-01-30 | End: 2020-04-14 | Stop reason: ALTCHOICE

## 2020-01-30 ASSESSMENT — ENCOUNTER SYMPTOMS
VOMITING: 0
GASTROINTESTINAL NEGATIVE: 1
CHEST TIGHTNESS: 0
EYE DISCHARGE: 0
COUGH: 0
DIARRHEA: 0
EYE ITCHING: 0
SHORTNESS OF BREATH: 0
EYE REDNESS: 0
EYE PAIN: 0
ALLERGIC/IMMUNOLOGIC NEGATIVE: 1
PHOTOPHOBIA: 0
SINUS PRESSURE: 0
RESPIRATORY NEGATIVE: 1
NAUSEA: 0
ABDOMINAL PAIN: 0
STRIDOR: 0
APNEA: 0
CONSTIPATION: 0
WHEEZING: 0
TROUBLE SWALLOWING: 0
BACK PAIN: 0
CHOKING: 0
RHINORRHEA: 0
SORE THROAT: 0
COLOR CHANGE: 0
BLOOD IN STOOL: 0

## 2020-01-30 NOTE — PROGRESS NOTES
Negative for activity change, appetite change, chills, diaphoresis, fever and unexpected weight change. HENT: Negative. Negative for ear pain, rhinorrhea, sinus pressure, sneezing, sore throat and trouble swallowing. Eyes: Negative for photophobia, pain, discharge, redness, itching and visual disturbance. Respiratory: Negative. Negative for apnea, cough, choking, chest tightness, shortness of breath, wheezing and stridor. Cardiovascular: Positive for leg swelling. Negative for chest pain and palpitations. Gastrointestinal: Negative. Negative for abdominal pain, blood in stool, constipation, diarrhea, nausea and vomiting. Genitourinary: Negative. Negative for decreased urine volume, difficulty urinating, discharge, dysuria, enuresis, flank pain, frequency, genital sores, hematuria, penile pain, penile swelling, scrotal swelling, testicular pain and urgency. Musculoskeletal: Positive for gait problem and myalgias (Right anterior thigh). Negative for arthralgias, back pain, joint swelling, neck pain and neck stiffness. Skin: Negative. Negative for color change, pallor, rash and wound. Allergic/Immunologic: Negative. Neurological: Negative for dizziness, tremors, seizures, syncope, facial asymmetry, speech difficulty, weakness, light-headedness, numbness and headaches. Psychiatric/Behavioral: Negative for agitation, behavioral problems, confusion, decreased concentration, dysphoric mood, hallucinations, self-injury, sleep disturbance and suicidal ideas. The patient is not nervous/anxious and is not hyperactive.           Objective:     Vitals:    01/30/20 1024 01/30/20 1028   BP: (!) 128/98 (!) 138/96   Site: Right Upper Arm Right Upper Arm   Position: Sitting Sitting   Cuff Size: Medium Adult Medium Adult   Pulse: 74 72   SpO2: 98%    Weight: 192 lb (87.1 kg)    Height: 5' 10\" (1.778 m)      Wt Readings from Last 3 Encounters:   01/30/20 192 lb (87.1 kg)   02/14/19 194 lb (88 kg)   02/20/18 182 lb 6.4 oz (82.7 kg)     Temp Readings from Last 3 Encounters:   02/13/18 98.3 °F (36.8 °C) (Oral)   02/05/18 98.2 °F (36.8 °C)   08/29/17 98.2 °F (36.8 °C) (Oral)     BP Readings from Last 3 Encounters:   01/30/20 (!) 138/96   02/14/19 130/88   02/20/18 122/80     Pulse Readings from Last 3 Encounters:   01/30/20 72   02/14/19 87   02/20/18 91     Physical Exam  Vitals signs and nursing note reviewed. Constitutional:       General: He is not in acute distress. Appearance: Normal appearance. He is well-developed. He is not diaphoretic. HENT:      Head: Normocephalic and atraumatic. Right Ear: Tympanic membrane, ear canal and external ear normal. There is no impacted cerumen. Left Ear: Tympanic membrane, ear canal and external ear normal. There is no impacted cerumen. Nose: Nose normal. No congestion or rhinorrhea. Mouth/Throat:      Mouth: Mucous membranes are moist.      Pharynx: Oropharynx is clear. No oropharyngeal exudate or posterior oropharyngeal erythema. Eyes:      General: No scleral icterus. Right eye: No discharge. Left eye: No discharge. Conjunctiva/sclera: Conjunctivae normal.   Neck:      Musculoskeletal: Normal range of motion and neck supple. No neck rigidity or muscular tenderness. Vascular: No carotid bruit. Trachea: No tracheal deviation. Cardiovascular:      Rate and Rhythm: Normal rate and regular rhythm. Pulses: Normal pulses. Heart sounds: Normal heart sounds. No murmur. No friction rub. No gallop. Pulmonary:      Effort: Pulmonary effort is normal. No respiratory distress. Breath sounds: Normal breath sounds. No stridor. No wheezing, rhonchi or rales. Chest:      Chest wall: No tenderness. Abdominal:      General: Bowel sounds are normal. There is no distension. Palpations: Abdomen is soft. There is no mass. Tenderness: There is no abdominal tenderness. There is no guarding or rebound. Hernia: No hernia is present. Musculoskeletal: Normal range of motion. General: No swelling, tenderness, deformity or signs of injury. Right lower leg: No edema. Left lower leg: No edema. Legs:    Lymphadenopathy:      Cervical: No cervical adenopathy. Skin:     General: Skin is warm and dry. Capillary Refill: Capillary refill takes less than 2 seconds. Coloration: Skin is not jaundiced or pale. Findings: No bruising, erythema, lesion or rash. Neurological:      General: No focal deficit present. Mental Status: He is alert and oriented to person, place, and time. Mental status is at baseline. Cranial Nerves: No cranial nerve deficit. Sensory: No sensory deficit. Motor: No weakness or abnormal muscle tone. Coordination: Coordination normal.      Gait: Gait normal.      Deep Tendon Reflexes: Reflexes are normal and symmetric. Reflexes normal.   Psychiatric:         Mood and Affect: Mood normal.         Behavior: Behavior normal.         Thought Content: Thought content normal.         Judgment: Judgment normal.         Nurse Only on 08/14/2019   Component Date Value Ref Range Status    Uric Acid, Serum 08/14/2019 7.7* 3.5 - 7.2 mg/dL Final           Assessment & Plan: The following diagnoses and conditions are stable with no further orders unless indicated:  1. Annual physical exam    2. Hyperlipidemia, unspecified hyperlipidemia type    3. Essential hypertension    4. Prediabetes    5. Other specified hypothyroidism    6. Idiopathic chronic gout of multiple sites without tophus    7. Hamstring tendonitis    8. Screen for colon cancer    9. Right leg swelling    10. Right leg pain        Patricia Salas was seen today for annual exam and leg pain. Right leg pain concerning for possible quadricept tendonitis vs. DVT vs. tendon tear/rupture.   Recommend STAT US of right lower leg - he is scheduled for this 01/31/2020 in the AM.  Recommend stat X-ray to DI Starr - CNP   lisinopril (PRINIVIL;ZESTRIL) 10 MG tablet Take 1 tablet by mouth daily Yes DI Siu CNP   ARMOUR THYROID 120 MG tablet TAKE 1 TABLET DAILY Yes DI Siu CNP   Testosterone (ANDROGEL PUMP) 20.25 MG/ACT (1.62%) GEL gel APPLY 2 PUMP ACTUATIONS (40.5 MG/2.5 GM) TOPICALLY ONCE DAILY IN THE MORNING TO THE SHOULDERS AND UPPER ARMS Yes Deb Jim DO   NEXIUM 20 MG capsule TAKE ONE CAPSULE BY MOUTH DAILY Yes Deb Jim, DO   Cholecalciferol (VITAMIN D3) 3000 UNITS TABS Take  by mouth. Yes Historical Provider, MD   magnesium 30 MG tablet Take 250 mg by mouth 2 times daily. Yes Historical Provider, MD        Orders Placed This Encounter   Medications    hydrochlorothiazide (HYDRODIURIL) 25 MG tablet     Sig: Take 1 tablet by mouth every morning     Dispense:  90 tablet     Refill:  1    predniSONE (DELTASONE) 10 MG tablet     Sig: Take 40 mg for 3 days then 30 mg for 3 days then 20 mg for 3 days then 10 mg for 3 days     Dispense:  30 tablet     Refill:  0         Return in about 2 weeks (around 2/13/2020) for Right leg follow up . Patient should call the office immediately with new or ongoing signs or symptoms or worsening, or proceedto the emergency room. No changes in past medical history, past surgical history, social history, or family history were noted during the patient encounter unless specifically listed above. All updates of past medicalhistory, past surgical history, social history, or family history were reviewed personally by me during the office visit. All problems listed in the assessment are stable unless noted otherwise. Medication profilereviewed personally by me during the office visit. Medication side effects and possible impairments from medications were discussed as applicable.     Call if pattern of symptoms change or persists for an extended time.     This document was prepared by a combination of typing and transcription

## 2020-01-31 ENCOUNTER — HOSPITAL ENCOUNTER (OUTPATIENT)
Dept: ULTRASOUND IMAGING | Age: 60
Discharge: HOME | End: 2020-01-31
Payer: COMMERCIAL

## 2020-01-31 ENCOUNTER — HOSPITAL ENCOUNTER (OUTPATIENT)
Dept: GENERAL RADIOLOGY | Age: 60
Discharge: HOME | End: 2020-01-31
Payer: COMMERCIAL

## 2020-01-31 LAB
ESTIMATED AVERAGE GLUCOSE: 102.5 MG/DL
HBA1C MFR BLD: 5.2 %

## 2020-01-31 PROCEDURE — 73562 X-RAY EXAM OF KNEE 3: CPT

## 2020-01-31 PROCEDURE — 93971 EXTREMITY STUDY: CPT

## 2020-02-01 DIAGNOSIS — R74.8 ELEVATED ALKALINE PHOSPHATASE LEVEL: ICD-10-CM

## 2020-02-01 LAB — GAMMA GLUTAMYL TRANSFERASE: 185 U/L (ref 8–61)

## 2020-03-11 ENCOUNTER — OFFICE VISIT (OUTPATIENT)
Dept: FAMILY MEDICINE CLINIC | Age: 60
End: 2020-03-11
Payer: COMMERCIAL

## 2020-03-11 VITALS
HEIGHT: 70 IN | DIASTOLIC BLOOD PRESSURE: 88 MMHG | BODY MASS INDEX: 26.63 KG/M2 | HEART RATE: 82 BPM | WEIGHT: 186 LBS | OXYGEN SATURATION: 97 % | SYSTOLIC BLOOD PRESSURE: 125 MMHG

## 2020-03-11 PROBLEM — Z87.891 FORMER SMOKER: Status: ACTIVE | Noted: 2018-01-10

## 2020-03-11 PROCEDURE — 99215 OFFICE O/P EST HI 40 MIN: CPT | Performed by: NURSE PRACTITIONER

## 2020-03-11 PROCEDURE — G8431 POS CLIN DEPRES SCRN F/U DOC: HCPCS | Performed by: NURSE PRACTITIONER

## 2020-03-11 PROCEDURE — G0444 DEPRESSION SCREEN ANNUAL: HCPCS | Performed by: NURSE PRACTITIONER

## 2020-03-11 ASSESSMENT — PATIENT HEALTH QUESTIONNAIRE - PHQ9
2. FEELING DOWN, DEPRESSED OR HOPELESS: 3
SUM OF ALL RESPONSES TO PHQ QUESTIONS 1-9: 18
7. TROUBLE CONCENTRATING ON THINGS, SUCH AS READING THE NEWSPAPER OR WATCHING TELEVISION: 3
10. IF YOU CHECKED OFF ANY PROBLEMS, HOW DIFFICULT HAVE THESE PROBLEMS MADE IT FOR YOU TO DO YOUR WORK, TAKE CARE OF THINGS AT HOME, OR GET ALONG WITH OTHER PEOPLE: 2
5. POOR APPETITE OR OVEREATING: 3
SUM OF ALL RESPONSES TO PHQ9 QUESTIONS 1 & 2: 6
1. LITTLE INTEREST OR PLEASURE IN DOING THINGS: 3
6. FEELING BAD ABOUT YOURSELF - OR THAT YOU ARE A FAILURE OR HAVE LET YOURSELF OR YOUR FAMILY DOWN: 0
3. TROUBLE FALLING OR STAYING ASLEEP: 3
SUM OF ALL RESPONSES TO PHQ QUESTIONS 1-9: 18
8. MOVING OR SPEAKING SO SLOWLY THAT OTHER PEOPLE COULD HAVE NOTICED. OR THE OPPOSITE, BEING SO FIGETY OR RESTLESS THAT YOU HAVE BEEN MOVING AROUND A LOT MORE THAN USUAL: 0
4. FEELING TIRED OR HAVING LITTLE ENERGY: 3
9. THOUGHTS THAT YOU WOULD BE BETTER OFF DEAD, OR OF HURTING YOURSELF: 0

## 2020-03-11 ASSESSMENT — ENCOUNTER SYMPTOMS: RESPIRATORY NEGATIVE: 1

## 2020-03-11 NOTE — PROGRESS NOTES
to be checked today. He denies any thoughts of hurting himself or others. He denies any recent stressors in his life other than work.       Past Medical History:   Diagnosis Date    Erectile dysfunction     GERD (gastroesophageal reflux disease)     Hypertension     Hypertriglyceridemia 1/10/2018    Hypothyroidism     Low testosterone     Recurrent kidney stones     Tobacco abuse 1/10/2018      Past Surgical History:   Procedure Laterality Date    APPENDECTOMY      FINGER SURGERY      HERNIA REPAIR      bilateral    LITHOTRIPSY      VASECTOMY         Family History   Problem Relation Age of Onset    Cancer Mother 79        breast     Arthritis Mother     High Blood Pressure Father     Stroke Father     Heart Attack Father 68    Heart Disease Father     High Cholesterol Father     High Blood Pressure Brother     Other Brother 27        Testicular Cancer     Other Sister         Hypothyrodism    Arthritis Sister     Diabetes Son     Other Son         Hypothyroidism       Social History     Tobacco Use    Smoking status: Former Smoker     Packs/day: 0.50     Years: 15.00     Pack years: 7.50     Types: Cigarettes     Last attempt to quit: 2018     Years since quittin.1    Smokeless tobacco: Never Used   Substance Use Topics    Alcohol use: Yes     Comment: weekly (1 drink of bourbon in a sitting)          Current Outpatient Medications   Medication Sig Dispense Refill    hydrochlorothiazide (HYDRODIURIL) 25 MG tablet Take 1 tablet by mouth every morning 90 tablet 1    sildenafil (VIAGRA) 50 MG tablet Take 1 tablet by mouth as needed for Erectile Dysfunction 10 tablet 0    lisinopril (PRINIVIL;ZESTRIL) 10 MG tablet Take 1 tablet by mouth daily 90 tablet 0    ARMOUR THYROID 120 MG tablet TAKE 1 TABLET DAILY 90 tablet 4    Testosterone (ANDROGEL PUMP) 20.25 MG/ACT (1.62%) GEL gel APPLY 2 PUMP ACTUATIONS (40.5 MG/2.5 GM) TOPICALLY ONCE DAILY IN THE MORNING TO THE SHOULDERS AND UPPER ARMS 75 g 2    NEXIUM 20 MG capsule TAKE ONE CAPSULE BY MOUTH DAILY 30 capsule 0    Cholecalciferol (VITAMIN D3) 3000 UNITS TABS Take  by mouth.  magnesium 30 MG tablet Take 250 mg by mouth 2 times daily. No current facility-administered medications for this visit. Allergies   Allergen Reactions    Atorvastatin      Myalgias     Protonix [Pantoprazole Sodium] Hives       Subjective:      Review of Systems   Constitutional: Negative. Respiratory: Negative. Cardiovascular: Negative. Skin: Negative. Neurological: Negative. Psychiatric/Behavioral: Positive for agitation, decreased concentration and dysphoric mood. Negative for behavioral problems, confusion, hallucinations, self-injury, sleep disturbance and suicidal ideas. The patient is nervous/anxious. The patient is not hyperactive. Objective:     Vitals:    03/11/20 1423 03/11/20 1425 03/11/20 1453   BP: (!) 132/94 (!) 128/94 125/88   Site: Right Upper Arm Right Upper Arm Right Upper Arm   Position: Sitting Sitting Sitting   Cuff Size: Medium Adult Medium Adult Medium Adult   Pulse: 82     SpO2: 97%     Weight: 186 lb (84.4 kg)     Height: 5' 10\" (1.778 m)       Wt Readings from Last 3 Encounters:   03/11/20 186 lb (84.4 kg)   01/30/20 192 lb (87.1 kg)   02/14/19 194 lb (88 kg)     Temp Readings from Last 3 Encounters:   02/13/18 98.3 °F (36.8 °C) (Oral)   02/05/18 98.2 °F (36.8 °C)   08/29/17 98.2 °F (36.8 °C) (Oral)     BP Readings from Last 3 Encounters:   03/11/20 125/88   01/30/20 (!) 138/96   02/14/19 130/88     Pulse Readings from Last 3 Encounters:   03/11/20 82   01/30/20 72   02/14/19 87     Physical Exam  Vitals signs and nursing note reviewed. Constitutional:       General: He is not in acute distress. Appearance: Normal appearance. He is well-developed. He is not diaphoretic. HENT:      Head: Normocephalic and atraumatic.       Right Ear: External ear normal.      Left Ear: External ear normal. Nose: Nose normal.   Eyes:      General:         Right eye: No discharge. Left eye: No discharge. Conjunctiva/sclera: Conjunctivae normal.   Neck:      Musculoskeletal: Normal range of motion and neck supple. No neck rigidity. Cardiovascular:      Rate and Rhythm: Normal rate. Pulmonary:      Effort: Pulmonary effort is normal.   Musculoskeletal: Normal range of motion. General: No deformity. Skin:     General: Skin is warm and dry. Coloration: Skin is not jaundiced or pale. Findings: No bruising, erythema, lesion or rash. Neurological:      Mental Status: He is alert and oriented to person, place, and time. Mental status is at baseline. Psychiatric:         Mood and Affect: Mood is depressed. Affect is flat. Behavior: Behavior normal.         Thought Content:  Thought content normal.         Judgment: Judgment normal.         Office Visit on 01/30/2020   Component Date Value Ref Range Status    WBC 01/30/2020 5.9  4.0 - 11.0 K/uL Final    RBC 01/30/2020 5.07  4.20 - 5.90 M/uL Final    Hemoglobin 01/30/2020 16.5  13.5 - 17.5 g/dL Final    Hematocrit 01/30/2020 48.1  40.5 - 52.5 % Final    MCV 01/30/2020 94.9  80.0 - 100.0 fL Final    MCH 01/30/2020 32.6  26.0 - 34.0 pg Final    MCHC 01/30/2020 34.3  31.0 - 36.0 g/dL Final    RDW 01/30/2020 13.6  12.4 - 15.4 % Final    Platelets 09/04/1906 241  135 - 450 K/uL Final    MPV 01/30/2020 8.5  5.0 - 10.5 fL Final    Neutrophils % 01/30/2020 55.5  % Final    Lymphocytes % 01/30/2020 34.6  % Final    Monocytes % 01/30/2020 7.3  % Final    Eosinophils % 01/30/2020 2.4  % Final    Basophils % 01/30/2020 0.2  % Final    Neutrophils Absolute 01/30/2020 3.3  1.7 - 7.7 K/uL Final    Lymphocytes Absolute 01/30/2020 2.0  1.0 - 5.1 K/uL Final    Monocytes Absolute 01/30/2020 0.4  0.0 - 1.3 K/uL Final    Eosinophils Absolute 01/30/2020 0.1  0.0 - 0.6 K/uL Final    Basophils Absolute 01/30/2020 0.0  0.0 - 0.2 K/uL 01/30/2020 0.9  0.9 - 1.8 ng/dL Final    T3, Free 01/30/2020 3.3  2.3 - 4.2 pg/mL Final    TSH 01/30/2020 1.08  0.27 - 4.20 uIU/mL Final    Hemoglobin A1C 01/30/2020 5.2  See comment % Final    Comment: Comment:  Diagnosis of Diabetes: > or = 6.5%  Increased risk of diabetes (Prediabetes): 5.7-6.4%  Glycemic Control: Nonpregnant Adults: <7.0%                    Pregnant: <6.0%        eAG 01/30/2020 102.5  mg/dL Final    Uric Acid, Serum 01/30/2020 7.7* 3.5 - 7.2 mg/dL Final    GGT 01/30/2020 185* 8 - 61 U/L Final           Assessment & Plan: The following diagnoses and conditions are stable with no further orders unless indicated:  1. Anxiety with depression    2. Fatigue due to depression    3. Positive depression screening        Hope West was seen today for anxiety. Hope West his symptoms seem to be related to anxiety and depression possibility of bipolar with hypomania. He continues to feel like his symptoms are coming from his thyroid. Thyroid levels to be drawn today. Informed him that his thyroid levels come back within normal limits, would he be willing to see psychiatry again or would you like to see counseling and try medication. Patient states that he is against medications and he was placed on Cymbalta 10 years ago and it caused him \"to be severely messed up. \"  Informed him that we will wait for his thyroid levels to come back and we will have him follow-up with psychiatry if levels are stable. He is in agreement with the plan of care. He was given a referral to see psychiatry today and he is to make an appointment tomorrow if his lab do not show any problems with his thyroid. He is asking for short-term disability paperwork to be completed rate to his fatigue and anxiety and depression. He is to bring the short-term disability paperwork to the office. If he needs to see psychiatry, psychiatry to complete his paperwork once an appointment is made.     Diagnoses and all orders for this visit:    Anxiety with depression    Fatigue due to depression  -     Magnesium  -     TSH without Reflex  -     T4, Free  -     T3  -     LAURENT  -     External Referral to Psychiatry    Positive depression screening  -     Positive Screen for Clinical Depression with a Documented Follow-up Plan       Prior to Visit Medications    Medication Sig Taking? Authorizing Provider   hydrochlorothiazide (HYDRODIURIL) 25 MG tablet Take 1 tablet by mouth every morning Yes Cyrena Perfect, APRN - CNP   sildenafil (VIAGRA) 50 MG tablet Take 1 tablet by mouth as needed for Erectile Dysfunction Yes Cyrena Perfect, APRN - CNP   lisinopril (PRINIVIL;ZESTRIL) 10 MG tablet Take 1 tablet by mouth daily Yes Cyrena Perfect, APRN - CNP   ARMOUR THYROID 120 MG tablet TAKE 1 TABLET DAILY Yes Cyrena Perfect, APRN - CNP   Testosterone (ANDROGEL PUMP) 20.25 MG/ACT (1.62%) GEL gel APPLY 2 PUMP ACTUATIONS (40.5 MG/2.5 GM) TOPICALLY ONCE DAILY IN THE MORNING TO THE SHOULDERS AND UPPER ARMS Yes Deb Jim,    NEXIUM 20 MG capsule TAKE ONE CAPSULE BY MOUTH DAILY Yes Deb Jim, DO   Cholecalciferol (VITAMIN D3) 3000 UNITS TABS Take  by mouth. Yes Historical Provider, MD   magnesium 30 MG tablet Take 250 mg by mouth 2 times daily. Yes Historical Provider, MD      No orders of the defined types were placed in this encounter. Return in about 8 weeks (around 5/6/2020) for Anxiety follow up . Patient should call the office immediately with new or ongoing signs or symptoms or worsening, or proceedto the emergency room. No changes in past medical history, past surgical history, social history, or family history were noted during the patient encounter unless specifically listed above. All updates of past medicalhistory, past surgical history, social history, or family history were reviewed personally by me during the office visit. All problems listed in the assessment are stable unless noted otherwise.

## 2020-03-11 NOTE — PATIENT INSTRUCTIONS
Anonymous, for people who are trying to quit smoking. · Consider signing up for a smoking cessation program, such as the American Lung Association's Freedom from Smoking program.  · Get text messaging support. Go to the website at www.smokefree. gov to sign up for the [facility] program.  · Set a quit date. Pick your date carefully so that it is not right in the middle of a big deadline or stressful time. Once you quit, do not even take a puff. Get rid of all ashtrays and lighters after your last cigarette. Clean your house and your clothes so that they do not smell of smoke. · Learn how to be a nonsmoker. Think about ways you can avoid those things that make you reach for a cigarette. ? Avoid situations that put you at greatest risk for smoking. For some people, it is hard to have a drink with friends without smoking. For others, they might skip a coffee break with coworkers who smoke. ? Change your daily routine. Take a different route to work or eat a meal in a different place. · Cut down on stress. Calm yourself or release tension by doing an activity you enjoy, such as reading a book, taking a hot bath, or gardening. · Talk to your doctor or pharmacist about nicotine replacement therapy, which replaces the nicotine in your body. You still get nicotine but you do not use tobacco. Nicotine replacement products help you slowly reduce the amount of nicotine you need. These products come in several forms, many of them available over-the-counter:  ? Nicotine patches  ? Nicotine gum and lozenges  ? Nicotine inhaler  · Ask your doctor about bupropion (Wellbutrin) or varenicline (Chantix), which are prescription medicines. They do not contain nicotine. They help you by reducing withdrawal symptoms, such as stress and anxiety. · Some people find hypnosis, acupuncture, and massage helpful for ending the smoking habit. · Eat a healthy diet and get regular exercise.  Having healthy habits will help your body move past its craving for nicotine. · Be prepared to keep trying. Most people are not successful the first few times they try to quit. Do not get mad at yourself if you smoke again. Make a list of things you learned and think about when you want to try again, such as next week, next month, or next year. Where can you learn more? Go to https://chpepiceweb.SETiT. org and sign in to your [employer] account. Enter S865 in the [employer] box to learn more about \"Stopping Smoking: Care Instructions. \"     If you do not have an account, please click on the \"Sign Up Now\" link. Current as of: July 4, 2019  Content Version: 12.3  © 5886-3852 Healthwise, [employer]. Care instructions adapted under license by [facility] ([facility]). If you have questions about a medical condition or this instruction, always ask your healthcare professional. [place] [address] any warranty or liability for your use of this information. Anxiety Disorder: Care Instructions  Your Care Instructions    Anxiety is a normal reaction to stress. Difficult situations can cause you to have symptoms such as sweaty palms and a nervous feeling. In an anxiety disorder, the symptoms are far more severe. Constant worry, muscle tension, trouble sleeping, nausea and diarrhea, and other symptoms can make normal daily activities difficult or impossible. These symptoms may occur for no reason, and they can affect your work, school, or social life. Medicines, counseling, and self-care can all help. Follow-up care is a key part of your treatment and safety. Be sure to make and go to all appointments, and call your doctor if you are having problems. It's also a good idea to know your test results and keep a list of the medicines you take. How can you care for yourself at home? · Take medicines exactly as directed. Call your doctor if you think you are having a problem with your medicine.   · Go to your counseling sessions and 10 minutes. Notice the feeling of calmness throughout your whole body. As you continue to breathe slowly and deeply, relax by doing the following for another 5 to 10 minutes:  · Tighten and relax each muscle group in your body. You can begin at your toes and work your way up to your head. · Imagine your muscle groups relaxing and becoming heavy. · Empty your mind of all thoughts. · Let yourself relax more and more deeply. · Become aware of the state of calmness that surrounds you. · When your relaxation time is over, you can bring yourself back to alertness by moving your fingers and toes and then your hands and feet and then stretching and moving your entire body. Sometimes people fall asleep during relaxation, but they usually wake up shortly afterward. · Always give yourself time to return to full alertness before you drive a car or do anything that might cause an accident if you are not fully alert. Never play a relaxation tape while you drive a car. When should you call for help? Call 911 anytime you think you may need emergency care. For example, call if:    · You feel you cannot stop from hurting yourself or someone else.   [employer] the numbers for these national suicide hotlines: 7-121-475-TALK (3-390.743.1735) and 6-756-BKHMRMS (5-405.354.7859). If you or someone you know talks about suicide or feeling hopeless, get help right away.   Watch closely for changes in your health, and be sure to contact your doctor if:    · You have anxiety or fear that affects your life.     · You have symptoms of anxiety that are new or different from those you had before. Where can you learn more? Go to https://"[employer]jordi.BONESUPPORT. org and sign in to your [employer] account. Enter P754 in the [employer] box to learn more about \"Anxiety Disorder: Care Instructions. \"     If you do not have an account, please click on the \"Sign Up Now\" link.   Current as of: May 28, 2019  Content Version: 12.3  © 7417-3246 Healthwise, Inc[employer]. Care instructions adapted under license by [facility] ([facility]). If you have questions about a medical condition or this instruction, always ask your healthcare professional. [address] any warranty or liability for your use of this information.

## 2020-03-12 LAB
ANTI-NUCLEAR ANTIBODY (ANA): NEGATIVE
MAGNESIUM: 2 MG/DL (ref 1.8–2.4)
T3 TOTAL: 2.3 NG/ML (ref 0.8–2)
T4 FREE: 1 NG/DL (ref 0.9–1.8)
TSH SERPL DL<=0.05 MIU/L-ACNC: 3.44 UIU/ML (ref 0.27–4.2)

## 2020-03-12 RX ORDER — LEVOTHYROXINE AND LIOTHYRONINE 9.5; 2.25 UG/1; UG/1
15 TABLET ORAL DAILY
Qty: 90 TABLET | Refills: 0 | Status: SHIPPED
Start: 2020-03-12 | End: 2020-05-07 | Stop reason: ALTCHOICE

## 2020-03-12 RX ORDER — LEVOTHYROXINE AND LIOTHYRONINE 76; 18 UG/1; UG/1
120 TABLET ORAL DAILY
Qty: 90 TABLET | Refills: 0
Start: 2020-03-12 | End: 2020-05-07 | Stop reason: ALTCHOICE

## 2020-03-12 RX ORDER — BUSPIRONE HYDROCHLORIDE 5 MG/1
TABLET ORAL
Qty: 90 TABLET | Refills: 1 | Status: SHIPPED | OUTPATIENT
Start: 2020-03-12 | End: 2020-05-07 | Stop reason: SDUPTHER

## 2020-03-15 RX ORDER — LISINOPRIL 10 MG/1
TABLET ORAL
Qty: 90 TABLET | Refills: 3 | Status: SHIPPED | OUTPATIENT
Start: 2020-03-15 | End: 2021-01-12 | Stop reason: DRUGHIGH

## 2020-03-23 ENCOUNTER — TELEPHONE (OUTPATIENT)
Dept: FAMILY MEDICINE CLINIC | Age: 60
End: 2020-03-23

## 2020-03-23 RX ORDER — ESCITALOPRAM OXALATE 10 MG/1
10 TABLET ORAL DAILY
Qty: 30 TABLET | Refills: 3 | Status: SHIPPED | OUTPATIENT
Start: 2020-03-23 | End: 2020-03-31 | Stop reason: ALTCHOICE

## 2020-03-23 NOTE — TELEPHONE ENCOUNTER
Buspar is to just be used as needed. He was not interested in a SSRI/SNRI medication until he followed up with psychiatry     Did he make an appt with psychiatry?

## 2020-03-23 NOTE — TELEPHONE ENCOUNTER
Pt called in stating that his Buspar is not working. He knows it takes a few weeks. But would like be off work for a few weeks and needs note for his employer.   Please contact the pt [phone]

## 2020-03-25 ENCOUNTER — PATIENT MESSAGE (OUTPATIENT)
Dept: FAMILY MEDICINE CLINIC | Age: 60
End: 2020-03-25

## 2020-03-25 NOTE — TELEPHONE ENCOUNTER
From: Jocy Palacios  To: Bella Bolaños, APRN - CNP  Sent: 3/25/2020 11:31 AM EDT  Subject: Visit Follow-Up Question    My work only requires a note to extend my absence until a april 6th follow up. You can send that to me via email or here on SpeedDate and I will forward it to work.  Thank you

## 2020-03-31 ENCOUNTER — TELEPHONE (OUTPATIENT)
Dept: FAMILY MEDICINE CLINIC | Age: 60
End: 2020-03-31

## 2020-03-31 RX ORDER — SERTRALINE HYDROCHLORIDE 25 MG/1
25 TABLET, FILM COATED ORAL DAILY
Qty: 30 TABLET | Refills: 3 | Status: SHIPPED | OUTPATIENT
Start: 2020-03-31 | End: 2020-05-07 | Stop reason: SDUPTHER

## 2020-04-01 NOTE — TELEPHONE ENCOUNTER
Spoke w/ patient. Informed of change.  Set pt up for [facility] MyChart appt follow up on Monday per provider previous request

## 2020-04-06 ENCOUNTER — VIRTUAL VISIT (OUTPATIENT)
Dept: FAMILY MEDICINE CLINIC | Age: 60
End: 2020-04-06
Payer: COMMERCIAL

## 2020-04-06 VITALS — SYSTOLIC BLOOD PRESSURE: 116 MMHG | DIASTOLIC BLOOD PRESSURE: 79 MMHG

## 2020-04-06 PROCEDURE — 99214 OFFICE O/P EST MOD 30 MIN: CPT | Performed by: NURSE PRACTITIONER

## 2020-04-06 RX ORDER — BUPROPION HYDROCHLORIDE 150 MG/1
150 TABLET ORAL EVERY MORNING
Qty: 30 TABLET | Refills: 1 | Status: SHIPPED | OUTPATIENT
Start: 2020-04-06 | End: 2020-05-07 | Stop reason: SDUPTHER

## 2020-04-06 ASSESSMENT — ENCOUNTER SYMPTOMS
BACK PAIN: 0
RESPIRATORY NEGATIVE: 1
GASTROINTESTINAL NEGATIVE: 1
ALLERGIC/IMMUNOLOGIC NEGATIVE: 1

## 2020-04-06 NOTE — PATIENT INSTRUCTIONS
Patient Education      Patient Education   Patient Education        Learning about Antidepressants  What are antidepressants? Antidepressants are medicines that change the levels of some chemicals in the brain. They can help affect moods. There are different types that work on brain chemicals in different ways. These medicines can help treat depression. They are also used for anxiety, eating disorders, post-traumatic stress disorder, and chronic pain. What are some examples? There are many different types of antidepressant medicines. They include:  · Selective serotonin reuptake inhibitors (SSRIs). Some examples include citalopram, fluoxetine, and sertraline. · Serotonin and norepinephrine reuptake inhibitors (SNRIs). Some examples include duloxetine and venlafaxine. · Atypical antidepressants. Some examples include bupropion, mirtazapine, and trazodone. · Tricyclic and tetracyclic antidepressants. Some examples include amitriptyline and nortriptyline. · Monoamine oxidase inhibitors (MAOIs). An example includes selegiline. What are the side effects? Side effects may vary. They depend on the medicine you take. But common ones include:  · Nausea. · Dry mouth. · Loss of appetite. · Diarrhea or constipation. · Sexual problems. (These may include loss of desire or erection problems.)  · Headaches. · Trouble falling asleep. Or you may wake up a lot at night. · Weight gain. · Feeling nervous or on edge. · Feeling drowsy in the daytime. Problems with sexual arousal and a lack of interest in sex are common side effects. If this happens to you, talk to your doctor. There are other medicines that may help with these problems. Mild side effects may go away after you take the medicine for a few weeks. If the side effects bother you, talk with your doctor. He or she may prescribe a different medicine. Or the doctor may suggest ways to manage your side effects. How do you take antidepressants safely?   If your

## 2020-04-06 NOTE — PROGRESS NOTES
Carol Monroe is a 64 y.o. male evaluated via telephone on 4/6/2020. Consent:  He and/or health care decision maker is aware that that he may receive a bill for this telephone service, depending on his insurance coverage, and has provided verbal consent to proceed: Yes      Documentation:  I communicated with the patient and/or health care decision maker about anxiety. Details of this discussion including any medical advice provided: n/a      I affirm this is a Patient Initiated Episode with an Established Patient who has not had a related appointment within my department in the past 7 days or scheduled within the next 24 hours.     Total Time: minutes: 5-10 minutes    Note: not billable if this call serves to triage the patient into an appointment for the relevant concern      Paras Crabtree

## 2020-04-06 NOTE — PROGRESS NOTES
2020    TELEHEALTH EVALUATION -- Audio/Visual (During QQUSN-19 public health emergency)    HPI:    Alycia Shah (:  1960) has requested an audio/video evaluation for the following concern(s):    Mr. Alycia Shah states he feels like his depression and a    Patient is here today to follow up virtually on hypertension. Taking medications as prescribed. Is trying to adhere to a no salt diet. Denies any chest pain, leg swelling, orthopnea. He stopped his HCTZ as he has not been eating much lately as he has not had an appetite. He states he had one episode of dizziness one week ago and took his blood pressure and it was 109/70 without taking the HCTZ. Ana Durant states he is seeing therapy with Dr. Gray Half office. He is seeing the therapist again which will be his second visit. He stopped the Lexapro and started on Zoloft and feels like th Zoloft is causing him to feel sleepy. He stopped Lexapro as he was worried about sexual side effects and his therapist recommended his try Zoloft. He has been on Zoloft for two days only and feels like he is always tired on it and he decided to take it at night. He states his depression and anxiety are about the same as previously. He is scheduled to see Dr. Jan Doherty in 3 weeks he believes. He denies any thoughts of hurting himself or others. He admits to feeling like he needs more time off of work because he is having a lot of social anxiety right now. He has been taking the Buspar and feels like it is helping him. Ana Durant states he feels like he could use an increase dose of his thyroid medication. He states he is just feeling so sleepy and he knows from the past that depression, anxiety, and hypothyroidism all play a role for him. He feels like this could still be a cause to his depression and anxiety. He has an appointment with endocrinology in two weeks. Ana Durant states he has noticed some right hip pain that comes and goes.   He gets pain in his right hip a few times a week. He has right knee pain that has resolved back in February of 2020. He states he hears cracking in his right hip at times and felt like it was going to give out on him a few days ago. He has taken Ibuprofen and this has helped. Review of Systems   Constitutional: Positive for fatigue and unexpected weight change. Negative for activity change, appetite change, chills, diaphoresis and fever. HENT: Negative. Respiratory: Negative. Cardiovascular: Negative. Gastrointestinal: Negative. Musculoskeletal: Positive for arthralgias and gait problem. Negative for back pain, joint swelling, myalgias, neck pain and neck stiffness. Skin: Negative. Allergic/Immunologic: Negative. Neurological: Positive for dizziness (Once and now resolved) and weakness (Generalized malaise). Negative for tremors, seizures, syncope, facial asymmetry, speech difficulty, light-headedness, numbness and headaches. Psychiatric/Behavioral: Positive for agitation, decreased concentration, dysphoric mood and sleep disturbance. Negative for behavioral problems, confusion, hallucinations, self-injury and suicidal ideas. The patient is nervous/anxious. The patient is not hyperactive. Prior to Visit Medications    Medication Sig Taking?  Authorizing Provider   buPROPion (WELLBUTRIN XL) 150 MG extended release tablet Take 1 tablet by mouth every morning Yes DI Marcial CNP   sertraline (ZOLOFT) 25 MG tablet Take 1 tablet by mouth daily Yes DI Marcial CNP   sildenafil (VIAGRA) 50 MG tablet Take 1 tablet by mouth as needed for Erectile Dysfunction Yes DI Marcial CNP   lisinopril (PRINIVIL;ZESTRIL) 10 MG tablet TAKE 1 TABLET DAILY Yes DI Marcial CNP   thyroid (ARMOUR) 15 MG tablet Take 1 tablet by mouth daily Yes DI Marcial CNP   thyroid (ARMOUR THYROID) 120 MG tablet Take 1 tablet by mouth daily Yes Ady Hinds DI Starr CNP   busPIRone (BUSPAR) 5 MG tablet Take 1-2 tablets by mouth up to three times a day as needed for anxiety Yes DI Sommer CNP   Testosterone (ANDROGEL PUMP) 20.25 MG/ACT (1.62%) GEL gel APPLY 2 PUMP ACTUATIONS (40.5 MG/2.5 GM) TOPICALLY ONCE DAILY IN THE MORNING TO THE SHOULDERS AND UPPER ARMS Yes Deb Jim,    NEXIUM 20 MG capsule TAKE ONE CAPSULE BY MOUTH DAILY Yes Deb Jim DO   Cholecalciferol (VITAMIN D3) 3000 UNITS TABS Take  by mouth. Yes Historical Provider, MD   magnesium 30 MG tablet Take 250 mg by mouth 2 times daily.  Yes Historical Provider, MD   hydrochlorothiazide (HYDRODIURIL) 25 MG tablet Take 1 tablet by mouth every morning  Patient not taking: Reported on 2020  DI Sommer CNP       Social History     Tobacco Use    Smoking status: Former Smoker     Packs/day: 0.50     Years: 15.00     Pack years: 7.50     Types: Cigarettes     Last attempt to quit: 2018     Years since quittin.2    Smokeless tobacco: Never Used   Substance Use Topics    Alcohol use: Yes     Comment: weekly (1 drink of bourbon in a sitting)     Drug use: No        Allergies   Allergen Reactions    Atorvastatin      Myalgias     Protonix [Pantoprazole Sodium] Hives   ,   Past Medical History:   Diagnosis Date    Erectile dysfunction     GERD (gastroesophageal reflux disease)     Hypertension     Hypertriglyceridemia 1/10/2018    Hypothyroidism     Low testosterone     Recurrent kidney stones     Tobacco abuse 1/10/2018   ,   Immunization History   Administered Date(s) Administered    Tdap (Boostrix, Adacel) 2018       PHYSICAL EXAMINATION:  [ INSTRUCTIONS:  \"[x]\" Indicates a positive item  \"[]\" Indicates a negative item 4    Vital Signs: (As obtained by patient/caregiver or   practitioner observation)    Blood pressure- 116/78    Respiratory rate- 12         Constitutional: [x] Appears well-developed and well-nourished [x] No apparent distress      [] Abnormal-   Mental status  [x] Alert and awake  [x] Oriented to person/place/time [x]Able to follow commands      Eyes:  EOM    [x]  Normal  [] Abnormal-  Sclera  [x]  Normal  [] Abnormal -         Discharge [x]  None visible  [] Abnormal -    HENT:   [x] Normocephalic, atraumatic. [] Abnormal   [] Mouth/Throat: Mucous membranes are moist.     External Ears [x] Normal  [] Abnormal-     Neck: [x] No visualized mass     Pulmonary/Chest: [x] Respiratory effort normal.  [x] No visualized signs of difficulty breathing or respiratory distress        [] Abnormal-      Musculoskeletal:   [x] Normal gait with no signs of ataxia         [x] Normal range of motion of neck        [] Abnormal-       Neurological:        [x] No Facial Asymmetry (Cranial nerve 7 motor function) (limited exam to video visit)          [x] No gaze palsy        [] Abnormal-         Skin:        [x] No significant exanthematous lesions or discoloration noted on facial skin         [] Abnormal-            Psychiatric:       [] Normal Affect [x] No Hallucinations        [x] Abnormal- Flat Affect       ASSESSMENT/PLAN:  1. Depression with anxiety  Continue to see psychiatry and therapy. Continue on Zoloft and try to give medication at least 2 weeks prior to adjusting medication. Reviewed potential side effects of Zoloft and Wellbutrin. He may continue to take Buspar as needed for anxiety. - buPROPion (WELLBUTRIN XL) 150 MG extended release tablet; Take 1 tablet by mouth every morning  Dispense: 30 tablet; Refill: 1    2. Essential hypertension  May stop the HCTZ. He may cut his Lisinopril in half. Follow up in 6 to 8 weeks. He is having episodes of dizziness which may be related to decrease in food consumption. However, recommend Lisinopril be decreased to 5 mg - will further evaluate at follow up visit. 3. Right hip pain  Recommend exercise and stretches. Most likely related to arthritis.   He declines physical therapy at this time, but is willing to perform exercises and stretches at home. Exercises and stretches mailed to his home as requested. 4. Hypothyroidism due to Hashimoto's thyroiditis  No change in medication at this time. He may follow up with endocrinology. Informed him he may be a candidate for Cytomel because of his T3, but he may discuss this more with endocrinology at that time. Return in about 8 weeks (around 6/1/2020) for [facility] - 40 min. Aftab Vu is a 64 y.o. male being evaluated by a Virtual Visit (video visit) encounter to address concerns as mentioned above. A caregiver was present when appropriate. Due to this being a TeleHealth encounter (During SRUNJ-17 public health emergency), evaluation of the following organ systems was limited: Vitals/Constitutional/EENT/Resp/CV/GI//MS/Neuro/Skin/Heme-Lymph-Imm. Pursuant to the emergency declaration under the [address] [place] [address] and the [employer] and [employer] Act, this Virtual Visit was conducted with patient's (and/or legal guardian's) consent, to reduce the patient's risk of exposure to COVID-19 and provide necessary medical care. The patient (and/or legal guardian) has also been advised to contact this office for worsening conditions or problems, and seek emergency medical treatment and/or call 911 if deemed necessary. Services were provided through a video synchronous discussion virtually to substitute for in-person clinic visit. Patient and provider were located at their individual homes. --DI Bravo CNP on 4/6/2020 at 2:01 PM    An electronic signature was used to authenticate this note. See someone right away if you want to hurt or kill yourself! -- If you ever feel like you might hurt yourself or someone else, do one of these things:  ?Call your doctor or nurse and tell them it is urgent  ? Call for an ambulance (in the [address] and [facility] [address])  ? Go to the emergency room at your local hospital  ?Call the [address] [place] [address]:  [phone]    I've explained to him that drugs of the SSRI class can have side effects such as weight gain, sexual dysfunction, insomnia, headache, nausea. These medications are generally effective at alleviating symptoms of anxiety and/or depression. Let me know if significant side effects do occur.

## 2020-04-06 NOTE — LETTER
[address] [facility]  [address] [facility] [address] [facility]  Phone: [phone]  Fax: [phone]    DI Bravo CNP        April 6, 2020     Patient: Aftab Vu   YOB: 1960   Date of Visit: 4/6/2020       To Whom it May Concern:    Aftab Vu was seen in my clinic on 4/6/2020. He may return to work on 4/13/20. If you have any questions or concerns, please don't hesitate to call.     Sincerely,           DI Bravo CNP

## 2020-04-14 ENCOUNTER — TELEMEDICINE (OUTPATIENT)
Dept: ENDOCRINOLOGY | Age: 60
End: 2020-04-14
Payer: COMMERCIAL

## 2020-04-14 PROBLEM — E03.9 ACQUIRED HYPOTHYROIDISM: Status: ACTIVE | Noted: 2020-04-14

## 2020-04-14 PROCEDURE — 99213 OFFICE O/P EST LOW 20 MIN: CPT | Performed by: NURSE PRACTITIONER

## 2020-04-14 ASSESSMENT — ENCOUNTER SYMPTOMS
EYE PAIN: 0
BACK PAIN: 0
COLOR CHANGE: 0
DIARRHEA: 0
SHORTNESS OF BREATH: 0
ABDOMINAL PAIN: 0
CONSTIPATION: 0

## 2020-04-14 NOTE — PROGRESS NOTES
Endocrinology  Ben Lowry CNP, [address] [facility] [address] [facility], [address] [place] [address] Av  Phone [phone]  Fax [phone]    Services were provided over a telephone/video to substitute for in-person clinic visit. Patient provided consent prior to the visit. Masha Marrufo is a 64 y.o. male who presents for evaluation of  hypothyroidism. Referring Provider: DI Aj CNP     Last A1C:   Lab Results   Component Value Date    LABA1C 5.2 2020    LABA1C 5.7 2018     Last BP Readings:   BP Readings from Last 3 Encounters:   20 116/79   20 125/88   20 (!) 138/96     Last LDL:   Lab Results   Component Value Date    LDLCALC 156 (H) 2020    LDLDIRECT 168 (H) 2016     Aspirin Use: no    Tobacco/Alcohol History:    Smoking status: Former Smoker     Packs/day: 0.50     Years: 15.00     Pack years: 7.50     Types: Cigarettes     Last attempt to quit: 2018     Years since quittin.2    Smokeless tobacco: Never Used    Alcohol use: Yes     Comment: weekly (1 drink of bourbon in a sitting)      Thyroid:   Masha Marrufo has a h/o hypothyroidism for 15 years. Patient has been on varying dosages of levothyroxine and is currently on 135 mg [place] Thyroid  Confirms that it is taken in early am on an empty stomach. Clarified that nothing to eat for at least 30 minutes after and no iron or calcium for at.least 3-4 hours after. Current symptoms include fatigue, weight gain, feeling cold and cold intolerance, depression. Patient denies constipation, swelling, tremulousness, palpitations, sweating, change in skin,  nails, or hair, heat intolerance.      Obstructive symptoms  - Change in voice  - Trouble swallowing    Predisposing factors for thyroid disease  Exposure to radiation (neck)  Exposure to iodine  FH of thyroid disease  FH of thyroid or other cancers       Lab Results   Component Value Date    TSH 3.44 2020    TSH 1.08

## 2020-04-15 DIAGNOSIS — R73.03 PREDIABETES: ICD-10-CM

## 2020-04-15 DIAGNOSIS — E03.9 ACQUIRED HYPOTHYROIDISM: ICD-10-CM

## 2020-04-15 LAB
T3 FREE: 2.8 PG/ML (ref 2.3–4.2)
T4 FREE: 1 NG/DL (ref 0.9–1.8)
THYROID PEROXIDASE (TPO) ABS: 18 IU/ML
TSH SERPL DL<=0.05 MIU/L-ACNC: 0.18 UIU/ML (ref 0.27–4.2)

## 2020-04-16 LAB
ESTIMATED AVERAGE GLUCOSE: 99.7 MG/DL
HBA1C MFR BLD: 5.1 %

## 2020-05-07 ENCOUNTER — VIRTUAL VISIT (OUTPATIENT)
Dept: FAMILY MEDICINE CLINIC | Age: 60
End: 2020-05-07
Payer: COMMERCIAL

## 2020-05-07 PROCEDURE — 99214 OFFICE O/P EST MOD 30 MIN: CPT | Performed by: NURSE PRACTITIONER

## 2020-05-07 RX ORDER — THYROID 180 MG
TABLET ORAL DAILY
COMMUNITY
Start: 2020-05-04 | End: 2021-07-30 | Stop reason: SDUPTHER

## 2020-05-07 RX ORDER — BUPROPION HYDROCHLORIDE 150 MG/1
150 TABLET ORAL EVERY MORNING
Qty: 30 TABLET | Refills: 1 | Status: SHIPPED | OUTPATIENT
Start: 2020-05-07 | End: 2020-06-02 | Stop reason: SDUPTHER

## 2020-05-07 RX ORDER — SERTRALINE HYDROCHLORIDE 25 MG/1
25 TABLET, FILM COATED ORAL DAILY
Qty: 90 TABLET | Refills: 1 | Status: SHIPPED | OUTPATIENT
Start: 2020-05-07 | End: 2020-05-07 | Stop reason: SDUPTHER

## 2020-05-07 RX ORDER — BUSPIRONE HYDROCHLORIDE 5 MG/1
TABLET ORAL
Qty: 180 TABLET | Refills: 1 | Status: SHIPPED | OUTPATIENT
Start: 2020-05-07 | End: 2021-06-28

## 2020-05-07 RX ORDER — SERTRALINE HYDROCHLORIDE 25 MG/1
25 TABLET, FILM COATED ORAL DAILY
Qty: 30 TABLET | Refills: 0 | Status: SHIPPED | OUTPATIENT
Start: 2020-05-07 | End: 2020-08-19 | Stop reason: ALTCHOICE

## 2020-05-07 ASSESSMENT — ENCOUNTER SYMPTOMS
RESPIRATORY NEGATIVE: 1
GASTROINTESTINAL NEGATIVE: 1

## 2020-05-07 NOTE — PROGRESS NOTES
2020    TELEHEALTH EVALUATION -- Audio/Visual (During NENYU-14 public health emergency)    HPI:    Manuel Schmitt (:  1960) has requested an audio/video evaluation for the following concern(s):    Lynn Paiz presents to the office today to follow up on his anxiety and depression. He states he still feels like it is still his thyroid. He is seeing a thyroid specialists Dr. Sebas Rubi. He is a holistic specialist.  He states he did not like the provider with endocrinology  he saw recently as the provider wanted him to go back on Synthroid and decided to go back with Dr. Sebas Rubi. Lynn Paiz states he stopped seeing therapy and psychiatry. He states he felt better once his [place] Thyroid medication was even with his elevated TSH by the holistic specialist .  His [place] Thyroid was increased from 135 mg to 180 mg. He states he feels great and he knew it was his thyroid causing issues. He states he is doing well on his Wellbutrin and Zoloft. He does not notice any sexual side effects and he does not feel tired on the medication anymore. He denies any suicidal ideation. He states the Buspar helps with anxiety as well and he takes it mainly at night when his mind is racing. He admits to getting into some poison ivy this week. He states he was coming down some bushes in the woods. He now has some little blisters on his right and left forearm and his bilateral lower leg. He states it is itching him and the hydrocortisone cream he has been placing on it is not helping much itching. Review of Systems   Constitutional: Negative. Respiratory: Negative. Cardiovascular: Negative. Gastrointestinal: Negative. Skin: Negative. Neurological: Negative. Psychiatric/Behavioral: Positive for dysphoric mood. Negative for agitation, behavioral problems, confusion, decreased concentration, hallucinations, self-injury, sleep disturbance and suicidal ideas. The patient is nervous/anxious.  The patient is not

## 2020-05-07 NOTE — PROGRESS NOTES
Alycia Shah is a 64 y.o. male evaluated via telephone on 5/7/2020. Consent:  He and/or health care decision maker is aware that that he may receive a bill for this telephone service, depending on his insurance coverage, and has provided verbal consent to proceed: Yes      Documentation:  I communicated with the patient and/or health care decision maker about follow up on anxiety. Details of this discussion including any medical advice provided: n/a      I affirm this is a Patient Initiated Episode with an Established Patient who has not had a related appointment within my department in the past 7 days or scheduled within the next 24 hours.     Total Time: minutes: 5-10 minutes    Note: not billable if this call serves to triage the patient into an appointment for the relevant concern      Jahaira Hill

## 2020-06-02 RX ORDER — BUPROPION HYDROCHLORIDE 150 MG/1
150 TABLET ORAL EVERY MORNING
Qty: 90 TABLET | Refills: 0 | Status: SHIPPED | OUTPATIENT
Start: 2020-06-02 | End: 2020-08-25

## 2020-06-30 ENCOUNTER — TELEPHONE (OUTPATIENT)
Dept: FAMILY MEDICINE CLINIC | Age: 60
End: 2020-06-30

## 2020-06-30 NOTE — TELEPHONE ENCOUNTER
Pt called stating that his right leg isn't getting any better. Stated PCP mentioned getting an MRI done if this happened at the beginning of the year. Pt states that he's also seen a chiropractor recently and mentioned the same thing and also seeing Dr. Gino Pace. Pt states the pain is getting to the point that's it's a little hard to walk. Pt would like a referral to Orthopedic and possible MRI.  Call back [phone]

## 2020-07-09 ENCOUNTER — OFFICE VISIT (OUTPATIENT)
Dept: ORTHOPEDIC SURGERY | Age: 60
End: 2020-07-09
Payer: COMMERCIAL

## 2020-07-09 VITALS — WEIGHT: 186 LBS | HEIGHT: 70 IN | BODY MASS INDEX: 26.63 KG/M2

## 2020-07-09 PROBLEM — M25.561 ACUTE PAIN OF RIGHT KNEE: Status: ACTIVE | Noted: 2020-07-09

## 2020-07-09 PROBLEM — M23.91 LOCKED KNEE, RIGHT: Status: ACTIVE | Noted: 2020-07-09

## 2020-07-09 PROBLEM — M23.203 DEGENERATIVE TEAR OF MEDIAL MENISCUS OF RIGHT KNEE: Status: ACTIVE | Noted: 2020-07-09

## 2020-07-09 PROCEDURE — E0114 CRUTCH UNDERARM PAIR NO WOOD: HCPCS | Performed by: ORTHOPAEDIC SURGERY

## 2020-07-09 PROCEDURE — L1812 KO ELASTIC W/JOINTS PRE OTS: HCPCS | Performed by: ORTHOPAEDIC SURGERY

## 2020-07-09 PROCEDURE — 99243 OFF/OP CNSLTJ NEW/EST LOW 30: CPT | Performed by: ORTHOPAEDIC SURGERY

## 2020-07-09 NOTE — PROGRESS NOTES
KNEE VISIT      HISTORY OF PRESENT ILLNESS    Bakari Tabares is a 64 y.o. male who presents for consultation at request of Sheila Mayo CNP, for right knee pain is had for the last 7 months or so. He says it began in December 2019 and now grades his pain 5/10. The pain is constant and associated with stiffness instability weakness. Twisting and sudden movements or side to side movement seems to aggravate it and rest seems to help. He has had steroids and other medication, he is done exercises and tried some over-the-counter bracing. He denies any problems prior to this issue. He says is affecting the way he works and walks and stands. ROS    Well-documented patient history form dated 7/9/2020  All other ROS negative except for above.     Past Surgical history    Past Surgical History:   Procedure Laterality Date    APPENDECTOMY      FINGER SURGERY      HERNIA REPAIR      bilateral    LITHOTRIPSY      VASECTOMY         PAST MEDICAL    Past Medical History:   Diagnosis Date    Degenerative tear of medial meniscus of right knee 7/9/2020    Erectile dysfunction     GERD (gastroesophageal reflux disease)     Hypertension     Hypertriglyceridemia 1/10/2018    Hypothyroidism     Low testosterone     Recurrent kidney stones     Tobacco abuse 1/10/2018       Allergies    Allergies   Allergen Reactions    Atorvastatin      Myalgias     Protonix [Pantoprazole Sodium] Hives       Meds    Current Outpatient Medications   Medication Sig Dispense Refill    buPROPion (WELLBUTRIN XL) 150 MG extended release tablet Take 1 tablet by mouth every morning 90 tablet 0    ARMOUR THYROID 180 MG tablet       Vitamins B1 B6 B12 (NEURO CATHERINE PO) Take by mouth      sertraline (ZOLOFT) 25 MG tablet Take 1 tablet by mouth daily 30 tablet 0    busPIRone (BUSPAR) 5 MG tablet Take 1-2 tablets by mouth up to three times a day as needed for anxiety 180 tablet 1    triamcinolone (KENALOG) 0.1 % ointment Apply to the affected area on skin 2 times daily for the next 7 days 1 Tube 0    sildenafil (VIAGRA) 50 MG tablet Take 1 tablet by mouth as needed for Erectile Dysfunction 12 tablet 2    lisinopril (PRINIVIL;ZESTRIL) 10 MG tablet TAKE 1 TABLET DAILY 90 tablet 3    Testosterone (ANDROGEL PUMP) 20.25 MG/ACT (1.62%) GEL gel APPLY 2 PUMP ACTUATIONS (40.5 MG/2.5 GM) TOPICALLY ONCE DAILY IN THE MORNING TO THE SHOULDERS AND UPPER ARMS 75 g 2    NEXIUM 20 MG capsule TAKE ONE CAPSULE BY MOUTH DAILY 30 capsule 0    Cholecalciferol (VITAMIN D3) 3000 UNITS TABS Take  by mouth.  magnesium 30 MG tablet Take 250 mg by mouth 2 times daily. No current facility-administered medications for this visit.         Social    Social History     Socioeconomic History    Marital status:      Spouse name: Not on file    Number of children: Not on file    Years of education: Not on file    Highest education level: Not on file   Occupational History    Occupation: manufacturing       Comment: [employer] Financial resource strain: Not on file    Food insecurity     Worry: Not on file     Inability: Not on file   [employer] needs     Medical: Not on file     Non-medical: Not on file   Tobacco Use    Smoking status: Former Smoker     Packs/day: 0.50     Years: 15.00     Pack years: 7.50     Types: Cigarettes     Last attempt to quit: 2018     Years since quittin.4    Smokeless tobacco: Never Used   Substance and Sexual Activity    Alcohol use: Yes     Comment: weekly (1 drink of bourbon in a sitting)     Drug use: No    Sexual activity: Yes     Partners: Male   Lifestyle    Physical activity     Days per week: Not on file     Minutes per session: Not on file    Stress: Not on file   Relationships    Social connections     Talks on phone: Not on file     Gets together: Not on file     Attends Hindu service: Not on file     Active member of club or organization: Not on file Attends meetings of clubs or organizations: Not on file     Relationship status: Not on file    Intimate partner violence     Fear of current or ex partner: Not on file     Emotionally abused: Not on file     Physically abused: Not on file     Forced sexual activity: Not on file   Other Topics Concern    Not on file   Social History Narrative    Not on file       Family HISTORY    Family History   Problem Relation Age of Onset    Cancer Mother 79        breast     Arthritis Mother     High Blood Pressure Father     Stroke Father     Heart Attack Father 68    Heart Disease Father     High Cholesterol Father     High Blood Pressure Brother     Other Brother 27        Testicular Cancer     Other Sister         Hypothyrodism    Arthritis Sister     Diabetes Son    Kat Exon Other Son         Hypothyroidism       PHYSICAL EXAM    Vital Signs:  Ht 5' 10\" (1.778 m)   Wt 186 lb (84.4 kg)   BMI 26.69 kg/m²   General Appearance:  Normal body habitus. Alert and oriented to person, place, and time. Affect:  Normal.   Gait: Antalgic flexed knee gait on the right. Good balance and coordination. Skin:  Intact. Sensation:  Intact. Strength:  Intact. Reflexes:  Intact. Pulses:  Intact. Knee Exam:    Effusion: Trace    Range of Motion Right Left   Extension -8 0   Flexion 110 125     Provocative Test Right Left    Positive Negative Positive Negative   Anterior drawer [] [x] [] [x]   Lachman [] [x] [] [x]   Posterior drawer [] [x] [] [x]   Varus testing [x] [] [] [x]   Valgus testing [x] [] [] [x]   Joint line tenderness [x] [] [] [x]     Additional Exam Comments: His neurocirculatory and lymphatic exam otherwise is normal symmetric to both lower extremities. He has exquisite medial joint line tenderness to direct palpation Brenda's maneuver and valgus stress. He has pain with attempts of full extension. He has no other palpable masses around the knee.       IMAGING STUDIES    X-rays 3 views of the right knee area reveal some [place] changes only    IMPRESSION    Acute right knee pain secondary to acute tear of a degenerative medial meniscus, with locking of his knee    PLAN      1. Conservative care options including physical therapy, NSAIDs, bracing, and activity modification were discussed. 2.  The indications for therapeutic injections were discussed. 3.  The indications for additional imaging studies were discussed. 4.  After considering the various options discussed, the patient elected to pursue a course that includes bracing and crutches to help him with gait and recommend an MRI of the right knee since he is now 7 months post pain with failure to improve with other conservative measures and has a flexed knee gait.

## 2020-07-14 ENCOUNTER — TELEPHONE (OUTPATIENT)
Dept: ORTHOPEDIC SURGERY | Age: 60
End: 2020-07-14

## 2020-08-05 ENCOUNTER — TELEPHONE (OUTPATIENT)
Dept: ORTHOPEDIC SURGERY | Age: 60
End: 2020-08-05

## 2020-08-05 NOTE — TELEPHONE ENCOUNTER
left message with patient to call and schedule a f/u appointment with Dr. Cathy Kaufman for mri results.

## 2020-08-13 ENCOUNTER — OFFICE VISIT (OUTPATIENT)
Dept: ORTHOPEDIC SURGERY | Age: 60
End: 2020-08-13
Payer: COMMERCIAL

## 2020-08-13 VITALS — HEIGHT: 70 IN | BODY MASS INDEX: 26.64 KG/M2 | WEIGHT: 186.07 LBS | RESPIRATION RATE: 12 BRPM

## 2020-08-13 PROCEDURE — 99213 OFFICE O/P EST LOW 20 MIN: CPT | Performed by: ORTHOPAEDIC SURGERY

## 2020-08-13 NOTE — PROGRESS NOTES
TABS Take  by mouth.  magnesium 30 MG tablet Take 250 mg by mouth 2 times daily. No current facility-administered medications for this visit.         Social    Social History     Socioeconomic History    Marital status:      Spouse name: Not on file    Number of children: Not on file    Years of education: Not on file    Highest education level: Not on file   Occupational History    Occupation: manufacturing       Comment: [employer] Flores Financial resource strain: Not on file    Food insecurity     Worry: Not on file     Inability: Not on file   Lorena Gaxiola needs     Medical: Not on file     Non-medical: Not on file   Tobacco Use    Smoking status: Former Smoker     Packs/day: 0.50     Years: 15.00     Pack years: 7.50     Types: Cigarettes     Last attempt to quit: 2018     Years since quittin.5    Smokeless tobacco: Never Used   Substance and Sexual Activity    Alcohol use: Yes     Comment: weekly (1 drink of bourbon in a sitting)     Drug use: No    Sexual activity: Yes     Partners: Male   Lifestyle    Physical activity     Days per week: Not on file     Minutes per session: Not on file    Stress: Not on file   Relationships    Social connections     Talks on phone: Not on file     Gets together: Not on file     Attends Muslim service: Not on file     Active member of club or organization: Not on file     Attends meetings of clubs or organizations: Not on file     Relationship status: Not on file    Intimate partner violence     Fear of current or ex partner: Not on file     Emotionally abused: Not on file     Physically abused: Not on file     Forced sexual activity: Not on file   Other Topics Concern    Not on file   Social History Narrative    Not on file       Family HISTORY    Family History   Problem Relation Age of Onset    Cancer Mother 79        breast     Arthritis Mother     High Blood Pressure Father     Stroke Father patient elected to pursue a course that includes diagnostic arthroscopy of the right knee to include chondroplasty of the patella chondral flap and limited synovectomy of the infrapatellar fat pad      INFORMED CONSENT NOTE        We discussed the risks, benefits, and alternatives to the proposed procedure, as well as the necessity of other members of the healthcare team participating in the procedure. All questions were answered and the patient elected to proceed with the proposed procedure and signed the informed consent form. The patient was counseled at length about the risks of neil Covid-19 during their perioperative period and any recovery window from their procedure. The patient was made aware that neil Covid-19  may worsen their prognosis for recovering from their procedure  and lend to a higher morbidity and/or mortality risk. All material risks, benefits, and reasonable alternatives including postponing the procedure were discussed. The patient does wish to proceed with the procedure at this time.

## 2020-08-14 PROBLEM — E88.89 HOFFA'S SYNDROME (HCC): Status: ACTIVE | Noted: 2020-08-14

## 2020-08-14 PROBLEM — M22.41 CHONDROMALACIA OF RIGHT PATELLA: Status: ACTIVE | Noted: 2020-08-14

## 2020-08-18 ENCOUNTER — TELEPHONE (OUTPATIENT)
Dept: ORTHOPEDIC SURGERY | Age: 60
End: 2020-08-18

## 2020-08-18 NOTE — TELEPHONE ENCOUNTER
Auth: NPR  Date: 08/28/20  Type of SX: OUTPATIENT  [place] [facility] [id number] [id number]   SX area: R KNEE  Insurance: DERICK [employer] OF [place]

## 2020-08-20 ENCOUNTER — TELEPHONE (OUTPATIENT)
Dept: ORTHOPEDIC SURGERY | Age: 60
End: 2020-08-20

## 2020-08-20 ENCOUNTER — ANESTHESIA EVENT (OUTPATIENT)
Dept: OPERATING ROOM | Age: 60
End: 2020-08-20
Payer: COMMERCIAL

## 2020-08-20 NOTE — TELEPHONE ENCOUNTER
FAXED COMPLETED APS TO THE [facility] @ [phone].     SIGNED RELEASE FOR THE [facility] DATED 8/20/2020

## 2020-08-21 ENCOUNTER — HOSPITAL ENCOUNTER (OUTPATIENT)
Age: 60
Discharge: HOME | End: 2020-08-21
Payer: COMMERCIAL

## 2020-08-21 ENCOUNTER — OFFICE VISIT (OUTPATIENT)
Dept: FAMILY MEDICINE CLINIC | Age: 60
End: 2020-08-21
Payer: COMMERCIAL

## 2020-08-21 VITALS
HEART RATE: 86 BPM | OXYGEN SATURATION: 98 % | TEMPERATURE: 97.4 F | WEIGHT: 179 LBS | SYSTOLIC BLOOD PRESSURE: 134 MMHG | HEIGHT: 70 IN | DIASTOLIC BLOOD PRESSURE: 88 MMHG | BODY MASS INDEX: 25.62 KG/M2

## 2020-08-21 LAB
ANION GAP SERPL CALCULATED.3IONS-SCNC: 11 MMOL/L (ref 3–16)
BASOPHILS ABSOLUTE: 0 K/UL (ref 0–0.2)
BASOPHILS RELATIVE PERCENT: 0.6 %
BUN BLDV-MCNC: 13 MG/DL (ref 7–20)
CALCIUM SERPL-MCNC: 9.7 MG/DL (ref 8.3–10.6)
CHLORIDE BLD-SCNC: 105 MMOL/L (ref 99–110)
CO2: 23 MMOL/L (ref 21–32)
CREAT SERPL-MCNC: 0.8 MG/DL (ref 0.9–1.3)
EOSINOPHILS ABSOLUTE: 0.1 K/UL (ref 0–0.6)
EOSINOPHILS RELATIVE PERCENT: 2.8 %
GFR AFRICAN AMERICAN: >60
GFR NON-AFRICAN AMERICAN: >60
GLUCOSE BLD-MCNC: 87 MG/DL (ref 70–99)
HCT VFR BLD CALC: 46.4 % (ref 40.5–52.5)
HEMOGLOBIN: 15.9 G/DL (ref 13.5–17.5)
LYMPHOCYTES ABSOLUTE: 1.7 K/UL (ref 1–5.1)
LYMPHOCYTES RELATIVE PERCENT: 41 %
MCH RBC QN AUTO: 31.8 PG (ref 26–34)
MCHC RBC AUTO-ENTMCNC: 34.2 G/DL (ref 31–36)
MCV RBC AUTO: 93.1 FL (ref 80–100)
MONOCYTES ABSOLUTE: 0.4 K/UL (ref 0–1.3)
MONOCYTES RELATIVE PERCENT: 9.5 %
NEUTROPHILS ABSOLUTE: 1.9 K/UL (ref 1.7–7.7)
NEUTROPHILS RELATIVE PERCENT: 46.1 %
PDW BLD-RTO: 12.9 % (ref 12.4–15.4)
PLATELET # BLD: 238 K/UL (ref 135–450)
PMV BLD AUTO: 7.9 FL (ref 5–10.5)
POTASSIUM SERPL-SCNC: 4.4 MMOL/L (ref 3.5–5.1)
RBC # BLD: 4.98 M/UL (ref 4.2–5.9)
SODIUM BLD-SCNC: 139 MMOL/L (ref 136–145)
T3 TOTAL: 2.77 NG/ML (ref 0.8–2)
T4 FREE: 1.1 NG/DL (ref 0.9–1.8)
TSH REFLEX: 0.03 UIU/ML (ref 0.27–4.2)
WBC # BLD: 4.1 K/UL (ref 4–11)

## 2020-08-21 PROCEDURE — U0003 INFECTIOUS AGENT DETECTION BY NUCLEIC ACID (DNA OR RNA); SEVERE ACUTE RESPIRATORY SYNDROME CORONAVIRUS 2 (SARS-COV-2) (CORONAVIRUS DISEASE [COVID-19]), AMPLIFIED PROBE TECHNIQUE, MAKING USE OF HIGH THROUGHPUT TECHNOLOGIES AS DESCRIBED BY CMS-2020-01-R: HCPCS

## 2020-08-21 PROCEDURE — 36415 COLL VENOUS BLD VENIPUNCTURE: CPT | Performed by: NURSE PRACTITIONER

## 2020-08-21 PROCEDURE — 93000 ELECTROCARDIOGRAM COMPLETE: CPT | Performed by: NURSE PRACTITIONER

## 2020-08-21 PROCEDURE — 99243 OFF/OP CNSLTJ NEW/EST LOW 30: CPT | Performed by: NURSE PRACTITIONER

## 2020-08-21 RX ORDER — FAMOTIDINE 40 MG/1
40 TABLET, FILM COATED ORAL EVERY EVENING
Qty: 90 TABLET | Refills: 3 | Status: SHIPPED | OUTPATIENT
Start: 2020-08-21 | End: 2021-09-10

## 2020-08-21 NOTE — PROGRESS NOTES
[address]                             Preoperative Evaluation        Yun Alves  YOB: 1960    Date of Service:  8/21/2020    Vitals:    08/21/20 0932   BP: 134/88   Site: Right Upper Arm   Position: Sitting   Cuff Size: Medium Adult   Pulse: 86   Temp: 97.4 °F (36.3 °C)   SpO2: 98%   Weight: 179 lb (81.2 kg)   Height: 5' 10\" (1.778 m)      Wt Readings from Last 2 Encounters:   08/21/20 179 lb (81.2 kg)   08/13/20 186 lb 1.1 oz (84.4 kg)     BP Readings from Last 3 Encounters:   08/21/20 134/88   04/06/20 116/79   03/11/20 125/88        Chief Complaint   Patient presents with    Pre-op Exam     pt is here for pre op exam for right knee surgery. Surgery is being done by Dr Rafael Smart on 8/28/20 at [facility]. Allergies   Allergen Reactions    Atorvastatin      Myalgias     Protonix [Pantoprazole Sodium] Hives     Outpatient Medications Marked as Taking for the 8/21/20 encounter (Office Visit) with DI Gomez CNP   Medication Sig Dispense Refill    famotidine (PEPCID) 40 MG tablet Take 1 tablet by mouth every evening 90 tablet 3    buPROPion (WELLBUTRIN XL) 150 MG extended release tablet Take 1 tablet by mouth every morning 90 tablet 0    ARMOUR THYROID 180 MG tablet       Vitamins B1 B6 B12 (NEURO CATHERINE PO) Take by mouth      triamcinolone (KENALOG) 0.1 % ointment Apply to the affected area on skin 2 times daily for the next 7 days 1 Tube 0    lisinopril (PRINIVIL;ZESTRIL) 10 MG tablet TAKE 1 TABLET DAILY 90 tablet 3    Testosterone (ANDROGEL PUMP) 20.25 MG/ACT (1.62%) GEL gel APPLY 2 PUMP ACTUATIONS (40.5 MG/2.5 GM) TOPICALLY ONCE DAILY IN THE MORNING TO THE SHOULDERS AND UPPER ARMS 75 g 2    Cholecalciferol (VITAMIN D3) 3000 UNITS TABS Take  by mouth.  magnesium 30 MG tablet Take 250 mg by mouth 2 times daily.          This patient presents to the office today for a preoperative consultation at the request of surgeon, Dr. Cristhian De La Torre, who plans on performing right knee chondromalacia patella with inflammation of Hoffa's fat pad on August 28 at [address] [place] [address]. The current problem began around December 2019 and symptoms have been worsening with time. Conservative therapy: Yes: brace, activity modification, NSAIDS, which has been not very effective. .    Planned anesthesia: General   Known anesthesia problems: None   Bleeding risk: No recent or remote history of abnormal bleeding  Personal or FH of DVT/PE: No    Patient objection to receiving blood products: No    Patient Active Problem List   Diagnosis    Low testosterone    Capsulitis of foot    GERD (gastroesophageal reflux disease)    Erectile dysfunction    Elevated LDL cholesterol level    Essential hypertension    Prediabetes    Hyperlipidemia    Former smoker    Acute idiopathic gout of multiple sites    Acquired hypothyroidism    Acute pain of right knee    Degenerative tear of medial meniscus of right knee    Locked knee, right    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HCC)    Chondromalacia of right patella       Past Medical History:   Diagnosis Date    Degenerative tear of medial meniscus of right knee 7/9/2020    Erectile dysfunction     GERD (gastroesophageal reflux disease)     Hypertension     Hypertriglyceridemia 1/10/2018    Hypothyroidism     Low testosterone     Recurrent kidney stones     Tobacco abuse 1/10/2018     Past Surgical History:   Procedure Laterality Date    APPENDECTOMY      FINGER SURGERY      HERNIA REPAIR      bilateral    LITHOTRIPSY      VASECTOMY       Family History   Problem Relation Age of Onset    Cancer Mother 79        breast     Arthritis Mother     High Blood Pressure Father     Stroke Father     Heart Attack Father 68    Heart Disease Father     High Cholesterol Father     High Blood Pressure Brother     Other Brother 27        Testicular Cancer     Other Sister         Hypothyrodism    Arthritis Sister     Diabetes Son     Other Son         Hypothyroidism     Social History     Socioeconomic History    Marital status:      Spouse name: Not on file    Number of children: Not on file    Years of education: Not on file    Highest education level: Not on file   Occupational History    Occupation: manufacturing       Comment: Galdino Flores Financial resource strain: Not on file    Food insecurity     Worry: Not on file     Inability: Not on file   Romansh [employer] needs     Medical: Not on file     Non-medical: Not on file   Tobacco Use    Smoking status: Former Smoker     Packs/day: 0.50     Years: 15.00     Pack years: 7.50     Types: Cigarettes     Last attempt to quit: 2018     Years since quittin.5    Smokeless tobacco: Never Used   Substance and Sexual Activity    Alcohol use: Yes     Comment: weekly (1 drink of bourbon in a sitting)     Drug use: No    Sexual activity: Yes     Partners: Male   Lifestyle    Physical activity     Days per week: Not on file     Minutes per session: Not on file    Stress: Not on file   Relationships    Social connections     Talks on phone: Not on file     Gets together: Not on file     Attends Jain service: Not on file     Active member of club or organization: Not on file     Attends meetings of clubs or organizations: Not on file     Relationship status: Not on file    Intimate partner violence     Fear of current or ex partner: Not on file     Emotionally abused: Not on file     Physically abused: Not on file     Forced sexual activity: Not on file   Other Topics Concern    Not on file   Social History Narrative    Not on file       Review of Systems  A comprehensive review of systems was negative except for what was noted in the HPI. Physical Exam   Constitutional: He is oriented to person, place, and time. He appears well-developed and well-nourished.  No distress. HENT:   Head: Normocephalic and atraumatic. Mouth/Throat: Uvula is midline, oropharynx is clear and moist and mucous membranes are normal.   Eyes: Conjunctivae and EOM are normal. Pupils are equal, round, and reactive to light. Neck: Trachea normal and normal range of motion. Neck supple. No JVD present. Carotid bruit is not present. No mass and no thyromegaly present. Cardiovascular: Normal rate, regular rhythm, normal heart sounds and intact distal pulses. Exam reveals no gallop and no friction rub. No murmur heard. Pulmonary/Chest: Effort normal and breath sounds normal. No respiratory distress. He has no wheezes. He has no rales. Abdominal: Soft. Normal aorta and bowel sounds are normal. He exhibits no distension and no mass. There is no hepatosplenomegaly. No tenderness. Musculoskeletal: He exhibits no edema and no tenderness. Neurological: He is alert and oriented to person, place, and time. He has normal strength. No cranial nerve deficit or sensory deficit. Coordination and gait normal.   Skin: Skin is warm and dry. No rash noted. No erythema. Psychiatric: He has a normal mood and affect. His behavior is normal.     EKG Interpretation:  normal EKG, normal sinus rhythm. Lab Review   Orders Only on 04/15/2020   Component Date Value    Hemoglobin A1C 04/15/2020 5.1     eAG 04/15/2020 99.7     T3, Free 04/15/2020 2.8     T4 Free 04/15/2020 1.0     TSH 04/15/2020 0.18*    THYROID PEROXIDASE (TPO)* 04/15/2020 18    Office Visit on 03/11/2020   Component Date Value    Magnesium 03/11/2020 2.00     TSH 03/11/2020 3.44     T4 Free 03/11/2020 1.0     T3, Total 03/11/2020 2.30*    LAURENT 03/11/2020 Negative            Assessment:       64 y.o. patient with planned surgery as above. Known risk factors for perioperative complications: Hypertension  Current medications which may produce withdrawal symptoms if withheld perioperatively: none      1. Acquired hypothyroidism    2. Pre-op exam    3. Gastroesophageal reflux disease without esophagitis       Plan:     1. Preoperative workup as follows: ECG, hemoglobin, hematocrit, electrolytes, creatinine, liver function studies  2. Further recommendations from consultants:No    3. Change in medication regimen before surgery: Take [place] thyroid and Nexium the morning of surgery. Stop NSAIDS (Motrin, Aleve, Ibuprofen), vitamin E, aspirin, fish oil 7-10 days prior to surgery unless instructed otherwise by surgeon. 4. Prophylaxis for cardiac events with perioperative beta-blockers: Not indicated  ACC/AHA indications for pre-operative beta-blocker use:    · Vascular surgery with history of postitive stress test  · Intermediate or high risk surgery with history of CAD   · Intermediate or high risk surgery with multiple clinical predictors of CAD- 2 of the following: history of compensated or prior heart failure, history of cerebrovascular disease, DM, or renal insufficiency    Routine administration of higher-dose, long-acting metoprolol in beta-blocker-naïve patients on the day of surgery, and in the absence of dose titration is associated with an overall increase in mortality. Beta-blockers should be started days to weeks prior to surgery and titrated to pulse < 70.  5. Deep vein thrombosis prophylaxis: regimen to be chosen by surgical team  6. No contraindications to planned surgery      If you have questions, please do not hesitate to call me ([phone]).      Sincerely,                Mariza Black CNP

## 2020-08-24 LAB — SARS-COV-2, NAA: NOT DETECTED

## 2020-08-28 ENCOUNTER — HOSPITAL ENCOUNTER (OUTPATIENT)
Age: 60
Setting detail: OUTPATIENT SURGERY
Discharge: HOME | End: 2020-08-28
Attending: ORTHOPAEDIC SURGERY | Admitting: ORTHOPAEDIC SURGERY
Payer: COMMERCIAL

## 2020-08-28 ENCOUNTER — ANESTHESIA (OUTPATIENT)
Dept: OPERATING ROOM | Age: 60
End: 2020-08-28
Payer: COMMERCIAL

## 2020-08-28 VITALS
RESPIRATION RATE: 16 BRPM | DIASTOLIC BLOOD PRESSURE: 98 MMHG | TEMPERATURE: 97 F | HEART RATE: 78 BPM | OXYGEN SATURATION: 99 % | SYSTOLIC BLOOD PRESSURE: 136 MMHG

## 2020-08-28 VITALS
OXYGEN SATURATION: 98 % | SYSTOLIC BLOOD PRESSURE: 104 MMHG | DIASTOLIC BLOOD PRESSURE: 70 MMHG | RESPIRATION RATE: 3 BRPM

## 2020-08-28 PROCEDURE — 2580000003 HC RX 258: Performed by: ANESTHESIOLOGY

## 2020-08-28 PROCEDURE — 3700000000 HC ANESTHESIA ATTENDED CARE: Performed by: ORTHOPAEDIC SURGERY

## 2020-08-28 PROCEDURE — 2500000003 HC RX 250 WO HCPCS: Performed by: ORTHOPAEDIC SURGERY

## 2020-08-28 PROCEDURE — 7100000000 HC PACU RECOVERY - FIRST 15 MIN: Performed by: ORTHOPAEDIC SURGERY

## 2020-08-28 PROCEDURE — 3600000014 HC SURGERY LEVEL 4 ADDTL 15MIN: Performed by: ORTHOPAEDIC SURGERY

## 2020-08-28 PROCEDURE — 2500000003 HC RX 250 WO HCPCS: Performed by: NURSE ANESTHETIST, CERTIFIED REGISTERED

## 2020-08-28 PROCEDURE — 7100000010 HC PHASE II RECOVERY - FIRST 15 MIN: Performed by: ORTHOPAEDIC SURGERY

## 2020-08-28 PROCEDURE — 3700000001 HC ADD 15 MINUTES (ANESTHESIA): Performed by: ORTHOPAEDIC SURGERY

## 2020-08-28 PROCEDURE — 6360000002 HC RX W HCPCS: Performed by: NURSE ANESTHETIST, CERTIFIED REGISTERED

## 2020-08-28 PROCEDURE — 2709999900 HC NON-CHARGEABLE SUPPLY: Performed by: ORTHOPAEDIC SURGERY

## 2020-08-28 PROCEDURE — 7100000011 HC PHASE II RECOVERY - ADDTL 15 MIN: Performed by: ORTHOPAEDIC SURGERY

## 2020-08-28 PROCEDURE — 6360000002 HC RX W HCPCS: Performed by: ORTHOPAEDIC SURGERY

## 2020-08-28 PROCEDURE — 3600000004 HC SURGERY LEVEL 4 BASE: Performed by: ORTHOPAEDIC SURGERY

## 2020-08-28 PROCEDURE — 7100000001 HC PACU RECOVERY - ADDTL 15 MIN: Performed by: ORTHOPAEDIC SURGERY

## 2020-08-28 RX ORDER — SODIUM CHLORIDE 0.9 % (FLUSH) 0.9 %
10 SYRINGE (ML) INJECTION EVERY 12 HOURS SCHEDULED
Status: DISCONTINUED | OUTPATIENT
Start: 2020-08-28 | End: 2020-08-28 | Stop reason: HOSPADM

## 2020-08-28 RX ORDER — PROPOFOL 10 MG/ML
INJECTION, EMULSION INTRAVENOUS PRN
Status: DISCONTINUED | OUTPATIENT
Start: 2020-08-28 | End: 2020-08-28 | Stop reason: SDUPTHER

## 2020-08-28 RX ORDER — DEXAMETHASONE SODIUM PHOSPHATE 4 MG/ML
INJECTION, SOLUTION INTRA-ARTICULAR; INTRALESIONAL; INTRAMUSCULAR; INTRAVENOUS; SOFT TISSUE PRN
Status: DISCONTINUED | OUTPATIENT
Start: 2020-08-28 | End: 2020-08-28 | Stop reason: SDUPTHER

## 2020-08-28 RX ORDER — LIDOCAINE HYDROCHLORIDE 20 MG/ML
INJECTION, SOLUTION EPIDURAL; INFILTRATION; INTRACAUDAL; PERINEURAL PRN
Status: DISCONTINUED | OUTPATIENT
Start: 2020-08-28 | End: 2020-08-28 | Stop reason: SDUPTHER

## 2020-08-28 RX ORDER — OXYCODONE HYDROCHLORIDE AND ACETAMINOPHEN 5; 325 MG/1; MG/1
2 TABLET ORAL PRN
Status: DISCONTINUED | OUTPATIENT
Start: 2020-08-28 | End: 2020-08-28 | Stop reason: HOSPADM

## 2020-08-28 RX ORDER — BUPIVACAINE HYDROCHLORIDE 2.5 MG/ML
INJECTION, SOLUTION INFILTRATION; PERINEURAL PRN
Status: DISCONTINUED | OUTPATIENT
Start: 2020-08-28 | End: 2020-08-28 | Stop reason: ALTCHOICE

## 2020-08-28 RX ORDER — HYDRALAZINE HYDROCHLORIDE 20 MG/ML
5 INJECTION INTRAMUSCULAR; INTRAVENOUS EVERY 10 MIN PRN
Status: DISCONTINUED | OUTPATIENT
Start: 2020-08-28 | End: 2020-08-28 | Stop reason: HOSPADM

## 2020-08-28 RX ORDER — HYDROCODONE BITARTRATE AND ACETAMINOPHEN 5; 325 MG/1; MG/1
1 TABLET ORAL EVERY 6 HOURS PRN
Qty: 28 TABLET | Refills: 0 | Status: SHIPPED | OUTPATIENT
Start: 2020-08-28 | End: 2020-09-04

## 2020-08-28 RX ORDER — SODIUM CHLORIDE 0.9 % (FLUSH) 0.9 %
10 SYRINGE (ML) INJECTION PRN
Status: DISCONTINUED | OUTPATIENT
Start: 2020-08-28 | End: 2020-08-28 | Stop reason: HOSPADM

## 2020-08-28 RX ORDER — ONDANSETRON 2 MG/ML
INJECTION INTRAMUSCULAR; INTRAVENOUS PRN
Status: DISCONTINUED | OUTPATIENT
Start: 2020-08-28 | End: 2020-08-28 | Stop reason: SDUPTHER

## 2020-08-28 RX ORDER — BUPIVACAINE HYDROCHLORIDE 2.5 MG/ML
INJECTION, SOLUTION EPIDURAL; INFILTRATION; INTRACAUDAL
Status: DISCONTINUED
Start: 2020-08-28 | End: 2020-08-28 | Stop reason: HOSPADM

## 2020-08-28 RX ORDER — PROMETHAZINE HYDROCHLORIDE 25 MG/ML
6.25 INJECTION, SOLUTION INTRAMUSCULAR; INTRAVENOUS
Status: DISCONTINUED | OUTPATIENT
Start: 2020-08-28 | End: 2020-08-28 | Stop reason: HOSPADM

## 2020-08-28 RX ORDER — LIDOCAINE HYDROCHLORIDE 10 MG/ML
INJECTION, SOLUTION EPIDURAL; INFILTRATION; INTRACAUDAL; PERINEURAL
Status: COMPLETED
Start: 2020-08-28 | End: 2020-08-28

## 2020-08-28 RX ORDER — FENTANYL CITRATE 50 UG/ML
INJECTION, SOLUTION INTRAMUSCULAR; INTRAVENOUS PRN
Status: DISCONTINUED | OUTPATIENT
Start: 2020-08-28 | End: 2020-08-28 | Stop reason: SDUPTHER

## 2020-08-28 RX ORDER — SODIUM CHLORIDE, SODIUM LACTATE, POTASSIUM CHLORIDE, CALCIUM CHLORIDE 600; 310; 30; 20 MG/100ML; MG/100ML; MG/100ML; MG/100ML
INJECTION, SOLUTION INTRAVENOUS CONTINUOUS
Status: DISCONTINUED | OUTPATIENT
Start: 2020-08-28 | End: 2020-08-28 | Stop reason: HOSPADM

## 2020-08-28 RX ORDER — OXYCODONE HYDROCHLORIDE AND ACETAMINOPHEN 5; 325 MG/1; MG/1
1 TABLET ORAL PRN
Status: DISCONTINUED | OUTPATIENT
Start: 2020-08-28 | End: 2020-08-28 | Stop reason: HOSPADM

## 2020-08-28 RX ORDER — ONDANSETRON 2 MG/ML
4 INJECTION INTRAMUSCULAR; INTRAVENOUS
Status: DISCONTINUED | OUTPATIENT
Start: 2020-08-28 | End: 2020-08-28 | Stop reason: HOSPADM

## 2020-08-28 RX ORDER — FENTANYL CITRATE 50 UG/ML
25 INJECTION, SOLUTION INTRAMUSCULAR; INTRAVENOUS EVERY 5 MIN PRN
Status: DISCONTINUED | OUTPATIENT
Start: 2020-08-28 | End: 2020-08-28 | Stop reason: HOSPADM

## 2020-08-28 RX ADMIN — ONDANSETRON 4 MG: 2 INJECTION, SOLUTION INTRAMUSCULAR; INTRAVENOUS at 10:32

## 2020-08-28 RX ADMIN — LIDOCAINE HYDROCHLORIDE 0.1 ML: 10 INJECTION, SOLUTION EPIDURAL; INFILTRATION; INTRACAUDAL; PERINEURAL at 10:05

## 2020-08-28 RX ADMIN — DEXAMETHASONE SODIUM PHOSPHATE 10 MG: 4 INJECTION, SOLUTION INTRAMUSCULAR; INTRAVENOUS at 10:32

## 2020-08-28 RX ADMIN — SODIUM CHLORIDE, POTASSIUM CHLORIDE, SODIUM LACTATE AND CALCIUM CHLORIDE: 600; 310; 30; 20 INJECTION, SOLUTION INTRAVENOUS at 10:29

## 2020-08-28 RX ADMIN — PROPOFOL 200 MG: 10 INJECTION, EMULSION INTRAVENOUS at 10:32

## 2020-08-28 RX ADMIN — Medication 2 G: at 10:29

## 2020-08-28 RX ADMIN — LIDOCAINE HYDROCHLORIDE 100 MG: 20 INJECTION, SOLUTION EPIDURAL; INFILTRATION; INTRACAUDAL; PERINEURAL at 10:32

## 2020-08-28 RX ADMIN — SODIUM CHLORIDE, POTASSIUM CHLORIDE, SODIUM LACTATE AND CALCIUM CHLORIDE: 600; 310; 30; 20 INJECTION, SOLUTION INTRAVENOUS at 10:06

## 2020-08-28 RX ADMIN — FENTANYL CITRATE 100 MCG: 50 INJECTION INTRAMUSCULAR; INTRAVENOUS at 10:32

## 2020-08-28 ASSESSMENT — PAIN - FUNCTIONAL ASSESSMENT
PAIN_FUNCTIONAL_ASSESSMENT: PREVENTS OR INTERFERES SOME ACTIVE ACTIVITIES AND ADLS
PAIN_FUNCTIONAL_ASSESSMENT: 0-10

## 2020-08-28 ASSESSMENT — PULMONARY FUNCTION TESTS
PIF_VALUE: 2
PIF_VALUE: 28
PIF_VALUE: 3
PIF_VALUE: 2
PIF_VALUE: 16
PIF_VALUE: 7
PIF_VALUE: 11
PIF_VALUE: 0
PIF_VALUE: 2
PIF_VALUE: 3
PIF_VALUE: 2
PIF_VALUE: 3
PIF_VALUE: 2
PIF_VALUE: 0
PIF_VALUE: 2
PIF_VALUE: 1
PIF_VALUE: 2
PIF_VALUE: 2
PIF_VALUE: 0
PIF_VALUE: 2

## 2020-08-28 ASSESSMENT — PAIN DESCRIPTION - DESCRIPTORS: DESCRIPTORS: ACHING

## 2020-08-28 ASSESSMENT — LIFESTYLE VARIABLES: SMOKING_STATUS: 0

## 2020-08-28 NOTE — OP NOTE
[address]                 [address] [facility] [place] [place]                                OPERATIVE REPORT    PATIENT NAME: Salvador Morris                       :        1960  MED REC NO:   [id number]                          ROOM:  ACCOUNT NO:   [de-identified]                           ADMIT DATE: 2020  PROVIDER:     Claudia Romero MD    DATE OF PROCEDURE:  2020    PREOPERATIVE DIAGNOSIS:  Right knee Hoffa syndrome. POSTOPERATIVE DIAGNOSIS:  Right knee Hoffa syndrome. OPERATION PERFORMED:  Right knee video arthroscopy with limited  synovectomy. SURGEON:  Claudia Romero MD    ANESTHESIA:  General.    BLOOD LOSS:  Less than 5 mL. COMPLICATIONS:  None noted. INDICATION FOR OPERATION:  This is a 51-year-old male, who presented  with a history of right knee pain consistent with anterior knee pain. An MRI was unremarkable for gross pathology, and it was felt that he in  all likelihood had may be some chondromalacia of patella and medial  plica syndrome or inflammation of Hoffa's fat pad. After discussing  treatment options, he elected for further recommendations of surgical  intervention. DESCRIPTION OF OPERATION:  The patient was taken to the operating room  where a general anesthetic was obtained. Antibiotics were given in IV  piggyback preoperatively. His right knee and leg were sterilely prepped  and draped, and a two-port arthroscopy of the right knee was carried out  in the usual fashion using a 30-degree arthroscope. Upon entry into the  knee, he was noted to have grade 1 to 2 chondromalacia of patella, which  was stable and left alone. He did have intervening synovitis from plica  and surrounding synovitis and from the fat pad, which was impinging the  joint. An aggressive limited synovectomy was performed through the fat  pad of medial plica.   Otherwise, medial meniscus, lateral meniscus, ACL,  PCL, and joint surfaces were pristine. The knee was then thoroughly  irrigated and evacuated of all loose debris and instilled with 20 mL of  0.25% Marcaine plain. The port was repaired with 4-0 nylon in a  figure-of-eight fashion. The knee was dressed with Steri-Strips,  dressings, sponge, ABDs, Webril, and Ace wrap. He appeared to tolerate  the procedure well and will be taken to the recovery room when stable. Less than 5 mL of blood loss and no noted complications.         Elaine Jacome MD    D: 08/28/2020 11:02:28       T: 08/28/2020 12:47:34     [email or web address]  Job#: [id number]     Doc#: [id number]    CC:

## 2020-08-28 NOTE — H&P
Update History & Physical    The patient's History and Physical  was reviewed with the patient and I examined the patient. There was no change. The surgical site was confirmed by the patient and me. Plan: The risks, benefits, expected outcome, and alternative to the recommended procedure have been discussed with the patient. Patient understands and wants to proceed with the procedure.      Electronically signed by Soraya Tadeo MD on 8/28/2020 at 9:33 AM

## 2020-08-28 NOTE — ANESTHESIA POSTPROCEDURE EVALUATION
Department of Anesthesiology  Postprocedure Note    Patient: Bakari Tabares  MRN: [id number]  YOB: 1960  Date of evaluation: 8/28/2020    Procedure Summary     Date:  08/28/20 Room / Location:  [address] [place] [address] OR 01 / [facility] [place]    Anesthesia Start:  1029 Anesthesia Stop:  1056    Procedure:  RIGHT KNEE VIDEO ARTHROSCOPY, LIMITED SYNOVECTOMY FOR HOFFA'S PAD, (Right Knee) Diagnosis:  (RIGHT KNEE HOFFA'S FAT PAD SYNDROME, CHONDROMALACIA PATELLA)    Surgeon:  Cammie Lange MD Responsible Provider:  Aziza Roper MD    Anesthesia Type:  general ASA Status:  3        Anesthesia Type: general    Juni Phase I: Juni Score: 10    Juni Phase II: Juni Score: 10    Last vitals: Reviewed and per EMR flowsheets.      Anesthesia Post Evaluation   Anesthetic Problems: no   Cardiovascular System Stable: yes  Respiratory Function: Airway Patent yes  ETT no  Ventilator no  Level of consciousness: awake, alert and oriented  Post-op pain: adequate analgesia  Hydration Adequate: yes  Nausea/Vomiting:no  Other Issues:     Darryl Mcdonald MD

## 2020-08-28 NOTE — BRIEF OP NOTE
Brief Postoperative Note      Patient: Manuel Rivas  YOB: 1960  MRN: [id number]    Date of Procedure: 8/28/2020    Pre-Op Diagnosis: RIGHT KNEE HOFFA'S FAT PAD SYNDROME, CHONDROMALACIA PATELLA    Post-Op Diagnosis: Same       Procedure(s):  RIGHT KNEE VIDEO ARTHROSCOPY, LIMITED SYNOVECTOMY FOR HOFFA'S PAD,    Surgeon(s):  García Love MD    Assistant:  Surgical Assistant: Berto Nevarez    Anesthesia: General    Estimated Blood Loss (mL): Minimal    Complications: None    Specimens:   * No specimens in log *    Implants:  * No implants in log *      Drains: * No LDAs found *    Findings: leona    Electronically signed by García Love MD on 8/28/2020 at 10:48 AM

## 2020-09-02 ENCOUNTER — OFFICE VISIT (OUTPATIENT)
Dept: ORTHOPEDIC SURGERY | Age: 60
End: 2020-09-02

## 2020-09-02 VITALS — HEIGHT: 70 IN | WEIGHT: 178.57 LBS | BODY MASS INDEX: 25.56 KG/M2 | RESPIRATION RATE: 12 BRPM

## 2020-09-02 PROCEDURE — 99024 POSTOP FOLLOW-UP VISIT: CPT | Performed by: ORTHOPAEDIC SURGERY

## 2020-09-11 ENCOUNTER — TELEPHONE (OUTPATIENT)
Dept: ORTHOPEDIC SURGERY | Age: 60
End: 2020-09-11

## 2020-09-23 ENCOUNTER — OFFICE VISIT (OUTPATIENT)
Dept: ORTHOPEDIC SURGERY | Age: 60
End: 2020-09-23

## 2020-09-23 PROCEDURE — 99024 POSTOP FOLLOW-UP VISIT: CPT | Performed by: ORTHOPAEDIC SURGERY

## 2020-09-23 RX ORDER — MELOXICAM 15 MG/1
15 TABLET ORAL DAILY
Qty: 30 TABLET | Refills: 3 | Status: SHIPPED | OUTPATIENT
Start: 2020-09-23 | End: 2021-02-22 | Stop reason: ALTCHOICE

## 2020-09-23 NOTE — PROGRESS NOTES
FOLLOW-UP VISIT      The patient returns for follow-up s/p RIGHT KNEE VIDEO ARTHROSCOPY, LIMITED SYNOVECTOMY FOR HOFFA'S PAD,    Date of Surgery    08/28/2020    Wound Status    Incisions are healing well. Exam    He is doing well no signs of infection DVT. He is happy with the result today. He still some stiffness and just trace effusion today which is much improved. I would recommend a topical and oral anti-inflammatory medication to help alleviate this issue.     Plan    Topical and oral anti-inflammatory medication and continue home program    Follow-up Appointment    3 to 4 weeks PRN

## 2021-01-12 RX ORDER — LISINOPRIL 5 MG/1
5 TABLET ORAL DAILY
Qty: 30 TABLET | Refills: 3 | Status: SHIPPED | OUTPATIENT
Start: 2021-01-12 | End: 2021-06-09

## 2021-01-12 RX ORDER — LISINOPRIL 20 MG/1
20 TABLET ORAL DAILY
Qty: 30 TABLET | Refills: 3 | Status: SHIPPED | OUTPATIENT
Start: 2021-01-12 | End: 2021-06-09

## 2021-02-22 ENCOUNTER — OFFICE VISIT (OUTPATIENT)
Dept: FAMILY MEDICINE CLINIC | Age: 61
End: 2021-02-22
Payer: COMMERCIAL

## 2021-02-22 VITALS
HEART RATE: 73 BPM | BODY MASS INDEX: 26.48 KG/M2 | SYSTOLIC BLOOD PRESSURE: 120 MMHG | DIASTOLIC BLOOD PRESSURE: 80 MMHG | OXYGEN SATURATION: 98 % | WEIGHT: 185 LBS | TEMPERATURE: 98.5 F | HEIGHT: 70 IN

## 2021-02-22 DIAGNOSIS — M25.561 CHRONIC PAIN OF RIGHT KNEE: ICD-10-CM

## 2021-02-22 DIAGNOSIS — M25.551 RIGHT HIP PAIN: ICD-10-CM

## 2021-02-22 DIAGNOSIS — K21.9 GASTROESOPHAGEAL REFLUX DISEASE WITHOUT ESOPHAGITIS: ICD-10-CM

## 2021-02-22 DIAGNOSIS — L98.9 SKIN LESION: ICD-10-CM

## 2021-02-22 DIAGNOSIS — E03.9 ACQUIRED HYPOTHYROIDISM: ICD-10-CM

## 2021-02-22 DIAGNOSIS — I10 ESSENTIAL HYPERTENSION: Primary | ICD-10-CM

## 2021-02-22 DIAGNOSIS — Z12.11 SCREEN FOR COLON CANCER: ICD-10-CM

## 2021-02-22 DIAGNOSIS — G89.29 CHRONIC PAIN OF RIGHT KNEE: ICD-10-CM

## 2021-02-22 DIAGNOSIS — R29.818 SUSPECTED SLEEP APNEA: ICD-10-CM

## 2021-02-22 PROCEDURE — 99214 OFFICE O/P EST MOD 30 MIN: CPT | Performed by: NURSE PRACTITIONER

## 2021-02-22 ASSESSMENT — ENCOUNTER SYMPTOMS
TROUBLE SWALLOWING: 0
COLOR CHANGE: 0
VOMITING: 0
SHORTNESS OF BREATH: 0
EYE PAIN: 0
NAUSEA: 0
COUGH: 0
APNEA: 0
ALLERGIC/IMMUNOLOGIC NEGATIVE: 1
EYE ITCHING: 0
SINUS PRESSURE: 0
EYE REDNESS: 0
CONSTIPATION: 0
ABDOMINAL PAIN: 0
EYE DISCHARGE: 0
GASTROINTESTINAL NEGATIVE: 1
RESPIRATORY NEGATIVE: 1
BACK PAIN: 0
WHEEZING: 0
DIARRHEA: 0
STRIDOR: 0
RHINORRHEA: 0
CHEST TIGHTNESS: 0
BLOOD IN STOOL: 0
SORE THROAT: 0
CHOKING: 0
PHOTOPHOBIA: 0

## 2021-02-22 ASSESSMENT — PATIENT HEALTH QUESTIONNAIRE - PHQ9
SUM OF ALL RESPONSES TO PHQ QUESTIONS 1-9: 0
1. LITTLE INTEREST OR PLEASURE IN DOING THINGS: 0

## 2021-02-22 NOTE — PROGRESS NOTES
Jacob  PHYSICIAN PRACTICES  [facility]  [address] [address]  Dept: [phone]  Dept Fax: [phone]  Loc: [phone]    Zurdo Christina is a 64 y.o. male who presents today for his medical conditions/complaints as noted below. Zurdo Christina is c/o of Hypertension (pt takes bp at home and feels it is well. pt takes medication every day.), Hyperlipidemia (pt is not fasting. pt takes medication every day. ), Hypothyroidism (pt takes armour thyroid and feels it is doing well. ), and Other (pt feels he may have a spider or insect bite on left and right leg. )        HPI:     Chief Complaint   Patient presents with    Hypertension     pt takes bp at home and feels it is well. pt takes medication every day.  Hyperlipidemia     pt is not fasting. pt takes medication every day.  Hypothyroidism     pt takes armour thyroid and feels it is doing well.  Other     pt feels he may have a spider or insect bite on left and right leg. HPI    Patient is here today to follow up on hypertension. Taking medications as prescribed. Is trying to adhere to a no salt diet. Denies any chest pain, leg swelling, orthopnea, dizziness. States blood pressure at home has been around 120/80. Patient is here for GERD follow up. Has been doing well with medication. Has tried to make improvements in diet. Denies any side effects to medication. Would like to continue with medication. He admits to having a small round skin lesion on his right ankle and on his left ankle for several weeks. He states the skin lesions are dry and rough. He admits to the skin lesions itching him. He thought he may have been bite by an insect by the way the skin lesion looked, but he is not sure. He denies any fever or chills. He admits he snores at night and feels tired often. He admits he never had his sleep study performed as he has been busy. He is wanting to get a new referral for sleep medicine. Patient is here to follow up on hypothyroidism. Taking medication as prescribed. Denies any palpitations, diarrhea, tremors, constipation, increased fatigue. Denies any side effect from the medication. He admits to having right knee pain for about one year now. He states he is not sure he reinjured his knee after his surgery last year, but states there are times he can barely walk because of the pain. He admits to taking Advil or Tylenol only if the pain is severe. He states he limps because of the pain, but admits to right hip pain as well and is not sure if it is hip pain that could be causing his knee pain. He states he has been having right hip pain for several months. He has a hard time walking and going up stairs because of the pain. The pain is constant and worse with activity.        Past Medical History:   Diagnosis Date    Degenerative tear of medial meniscus of right knee 7/9/2020    Erectile dysfunction     GERD (gastroesophageal reflux disease)     Hypertension     Hypertriglyceridemia 1/10/2018    Hypothyroidism     Low testosterone     Recurrent kidney stones     Tobacco abuse 1/10/2018      Past Surgical History:   Procedure Laterality Date    APPENDECTOMY      FINGER SURGERY      HERNIA REPAIR      bilateral    KNEE ARTHROSCOPY Right 8/28/2020    RIGHT KNEE VIDEO ARTHROSCOPY, LIMITED SYNOVECTOMY FOR HOFFA'S PAD, performed by [facility]emin [facility] at [facility]         Family History   Problem Relation Age of Onset    Cancer Mother 79        breast     Arthritis Mother     High Blood Pressure Father     Stroke Father     Heart Attack Father 68    Heart Disease Father     High Cholesterol Father     High Blood Pressure Brother     Other Brother 27 Testicular Cancer     Other Sister         Hypothyrodism    Arthritis Sister     Diabetes Son     Other Son         Hypothyroidism       Social History     Tobacco Use    Smoking status: Former Smoker     Packs/day: 0.50     Years: 15.00     Pack years: 7.50     Types: Cigarettes     Quit date: 1/22/2018     Years since quitting: 3.0    Smokeless tobacco: Never Used   Substance Use Topics    Alcohol use: Yes     Comment: weekly (1 drink of bourbon in a sitting)       Current Outpatient Medications   Medication Sig Dispense Refill    nystatin-triamcinolone (MYCOLOG II) 704461-6.1 UNIT/GM-% cream Apply to the infected area two times a day 30 g 0    lisinopril (PRINIVIL;ZESTRIL) 20 MG tablet Take 1 tablet by mouth daily 30 tablet 3    lisinopril (PRINIVIL;ZESTRIL) 5 MG tablet Take 1 tablet by mouth daily 30 tablet 3    sildenafil (VIAGRA) 50 MG tablet Take 1 tablet by mouth daily as needed for Erectile Dysfunction 12 tablet 1    famotidine (PEPCID) 40 MG tablet Take 1 tablet by mouth every evening 90 tablet 3    ARMOUR THYROID 180 MG tablet       Vitamins B1 B6 B12 (NEURO CATHERINE PO) Take by mouth      triamcinolone (KENALOG) 0.1 % ointment Apply to the affected area on skin 2 times daily for the next 7 days 1 Tube 0    Testosterone (ANDROGEL PUMP) 20.25 MG/ACT (1.62%) GEL gel APPLY 2 PUMP ACTUATIONS (40.5 MG/2.5 GM) TOPICALLY ONCE DAILY IN THE MORNING TO THE SHOULDERS AND UPPER ARMS 75 g 2    Cholecalciferol (VITAMIN D3) 3000 UNITS TABS Take  by mouth.  magnesium 30 MG tablet Take 250 mg by mouth 2 times daily. No current facility-administered medications for this visit. Allergies   Allergen Reactions    Atorvastatin      Myalgias     Protonix [Pantoprazole Sodium] Hives       :      Review of Systems   Constitutional: Positive for fatigue. Negative for activity change, appetite change, chills, diaphoresis, fever and unexpected weight change. BP Readings from Last 3 Encounters:   02/22/21 120/80   08/28/20 104/70   08/28/20 (!) 136/98     Pulse Readings from Last 3 Encounters:   02/22/21 73   08/28/20 78   08/21/20 86     Physical Exam  Vitals signs and nursing note reviewed. Constitutional:       General: He is not in acute distress. Appearance: Normal appearance. He is well-developed. He is not diaphoretic. HENT:      Head: Normocephalic and atraumatic. Right Ear: Tympanic membrane, ear canal and external ear normal. There is no impacted cerumen. Left Ear: Tympanic membrane, ear canal and external ear normal. There is no impacted cerumen. Nose: Nose normal. No congestion or rhinorrhea. Mouth/Throat:      Mouth: Mucous membranes are moist.      Pharynx: Oropharynx is clear. No oropharyngeal exudate or posterior oropharyngeal erythema. Eyes:      General: No scleral icterus. Right eye: No discharge. Left eye: No discharge. Extraocular Movements: Extraocular movements intact. Conjunctiva/sclera: Conjunctivae normal.      Pupils: Pupils are equal, round, and reactive to light. Neck:      Musculoskeletal: Normal range of motion and neck supple. No neck rigidity or muscular tenderness. Vascular: No carotid bruit. Trachea: No tracheal deviation. Cardiovascular:      Rate and Rhythm: Normal rate and regular rhythm. Pulses: Normal pulses. Heart sounds: Normal heart sounds. No murmur. No friction rub. No gallop. Pulmonary:      Effort: Pulmonary effort is normal. No respiratory distress. Breath sounds: Normal breath sounds. No stridor. No wheezing, rhonchi or rales. Chest:      Chest wall: No tenderness. Abdominal:      General: Bowel sounds are normal. There is no distension. Palpations: Abdomen is soft. There is no mass. Tenderness: There is no abdominal tenderness. There is no guarding or rebound. Hernia: No hernia is present.    Musculoskeletal: General: Tenderness (Right hip and right knee) present. No swelling, deformity or signs of injury. Right hip: He exhibits decreased range of motion, decreased strength and tenderness. Right knee: He exhibits decreased range of motion and swelling. Tenderness found. Right lower leg: No edema. Left lower leg: No edema. Lymphadenopathy:      Cervical: No cervical adenopathy. Skin:     General: Skin is warm and dry. Capillary Refill: Capillary refill takes less than 2 seconds. Coloration: Skin is not jaundiced or pale. Findings: Rash present. No bruising, erythema or lesion. Rash is macular, papular and scaling. Neurological:      General: No focal deficit present. Mental Status: He is alert and oriented to person, place, and time. Mental status is at baseline. Cranial Nerves: No cranial nerve deficit. Sensory: No sensory deficit. Motor: No weakness or abnormal muscle tone. Coordination: Coordination normal.      Gait: Gait normal.      Deep Tendon Reflexes: Reflexes are normal and symmetric. Reflexes normal.   Psychiatric:         Mood and Affect: Mood normal.         Behavior: Behavior normal.         Thought Content: Thought content normal.         Judgment: Judgment normal.         Hospital Outpatient Visit on 08/21/2020   Component Date Value Ref Range Status    SARS-CoV-2, LEXY 08/21/2020 NOT DETECTED  NOT DETECTED Final    Comment: The SARS-CoV-2 assay is a real-time RT-PCR test intended for the  qualitative detection of nucleic acid from the SARS-CoV-2 in  respiratory specimens from individuals. Testing is limited to the  guidelines of FDA Emergency Use Authorization FDA for performing  SARS-CoV-2 testing.   A Non-Detected result does not preclude the possibility of 2019-nCoV  infection since the adequacy of sample collection and/or low viral  burden may result in the presence of viral nucleic acids below the analytical sensitivity of this test method. Test results should be used  with caution and in conjunction with other clinical and laboratory data  in making a diagnosis. Performed at: [employer]  [address] [facility] [address]  : Gareth Covarrubias MD             Assessment & Plan: The following diagnoses and conditions are stable with no further orders unless indicated:  1. Essential hypertension    2. Acquired hypothyroidism    3. Gastroesophageal reflux disease without esophagitis    4. Right hip pain    5. Chronic pain of right knee    6. Skin lesion    7. Suspected sleep apnea    8. Screen for colon cancer        Marylu Winslow was seen today for hypertension, hyperlipidemia, hypothyroidism and other. BP at home has been stable around 120/80. He believes BP in office today is elevated related to his pain. He is going to send BP readings in 1-2 weeks via [employer]. Skin lesions resemble fungal in nature. Recommend nystatin triamcinolone cream.  He is to call the office in 1 week if no improvement with cream.      Hypothyroid symptoms stable. He is to continue to follow up with his homeopathic provider he sees for his thyroid. Significant knee and hip pain. He is limping and has a hard time with range of motion with his knee. Recommend NSAID. He declines Medrol dose pack. He may try Glucosamine for his pain. He is to follow up with orthopedic. Diagnoses and all orders for this visit:    Essential hypertension    Acquired hypothyroidism    Gastroesophageal reflux disease without esophagitis    Right hip pain  -     Sandy Mccoy MD, Orthopedic Surgery (General), [facility]  -     XR HIP RIGHT (2-3 VIEWS);  Future    Chronic pain of right knee  -     Jyoti Chaudhry MD, Orthopedic Surgery (General), [facility]    Skin lesion  -     nystatin-triamcinolone (MYCOLOG II) 143214-6.1 UNIT/GM-% cream; Apply to the infected area two times a day Suspected sleep apnea  -     Tristan Brown MD, Sleep Medicine, [facility]    Screen for colon cancer  -     POCT Fecal Immunochemical Test (FIT); Future      Prior to Visit Medications    Medication Sig Taking? Authorizing Provider   nystatin-triamcinolone (MYCOLOG II) 006737-8.1 UNIT/GM-% cream Apply to the infected area two times a day Yes ID Carlton CNP   lisinopril (PRINIVIL;ZESTRIL) 20 MG tablet Take 1 tablet by mouth daily Yes DI Carlton CNP   lisinopril (PRINIVIL;ZESTRIL) 5 MG tablet Take 1 tablet by mouth daily Yes DI Carlton CNP   sildenafil (VIAGRA) 50 MG tablet Take 1 tablet by mouth daily as needed for Erectile Dysfunction Yes DI Carlton CNP   famotidine (PEPCID) 40 MG tablet Take 1 tablet by mouth every evening Yes DI Carlton CNP   ARMOUR THYROID 180 MG tablet  Yes Historical Provider, MD   Vitamins B1 B6 B12 (NEURO CATHERINE PO) Take by mouth Yes Historical Provider, MD   triamcinolone (KENALOG) 0.1 % ointment Apply to the affected area on skin 2 times daily for the next 7 days Yes DI Carlton CNP   Testosterone (ANDROGEL PUMP) 20.25 MG/ACT (1.62%) GEL gel APPLY 2 PUMP ACTUATIONS (40.5 MG/2.5 GM) TOPICALLY ONCE DAILY IN THE MORNING TO THE SHOULDERS AND UPPER ARMS Yes Deb Jim,    Cholecalciferol (VITAMIN D3) 3000 UNITS TABS Take  by mouth. Yes Historical Provider, MD   magnesium 30 MG tablet Take 250 mg by mouth 2 times daily.  Yes Historical Provider, MD     Orders Placed This Encounter   Medications    nystatin-triamcinolone (MYCOLOG II) 473627-3.8 UNIT/GM-% cream     Sig: Apply to the infected area two times a day     Dispense:  30 g     Refill:  0         Return in about 6 months (around 8/22/2021), or if symptoms worsen or fail to improve, for Annual physical. Patient should call the office immediately with new or ongoing signs or symptoms or worsening, or proceedto the emergency room. No changes in past medical history, past surgical history, social history, or family history were noted during the patient encounter unless specifically listed above. All updates of past medicalhistory, past surgical history, social history, or family history were reviewed personally by me during the office visit. All problems listed in the assessment are stable unless noted otherwise. Medication profilereviewed personally by me during the office visit. Medication side effects and possible impairments from medications were discussed as applicable. Call if pattern of symptoms change or persists for an extended time. This document was prepared by a combination of typing and transcription through a voice recognition software. All medications have the potential for adverse effects. All medications effect each person differently. Please read and review provided information related to medication. If the medication that you have been prescribed has the potential to cause sedation, do not drive or operate car, truck, or heavy machinery until you know how the medication will effect you. If you experience any adverse effects from the medication, please call the office or report to the emergency department.

## 2021-02-23 ENCOUNTER — HOSPITAL ENCOUNTER (OUTPATIENT)
Age: 61
Discharge: HOME | End: 2021-02-23
Payer: COMMERCIAL

## 2021-02-23 ENCOUNTER — HOSPITAL ENCOUNTER (OUTPATIENT)
Dept: GENERAL RADIOLOGY | Age: 61
Discharge: HOME | End: 2021-02-23
Payer: COMMERCIAL

## 2021-02-23 DIAGNOSIS — M25.551 RIGHT HIP PAIN: ICD-10-CM

## 2021-02-23 PROCEDURE — 73502 X-RAY EXAM HIP UNI 2-3 VIEWS: CPT

## 2021-03-18 ENCOUNTER — OFFICE VISIT (OUTPATIENT)
Dept: ORTHOPEDIC SURGERY | Age: 61
End: 2021-03-18
Payer: COMMERCIAL

## 2021-03-18 VITALS — HEIGHT: 72 IN | WEIGHT: 180 LBS | BODY MASS INDEX: 24.38 KG/M2 | TEMPERATURE: 97.2 F | RESPIRATION RATE: 12 BRPM

## 2021-03-18 DIAGNOSIS — M16.11 PRIMARY OSTEOARTHRITIS OF RIGHT HIP: Primary | ICD-10-CM

## 2021-03-18 PROCEDURE — 99215 OFFICE O/P EST HI 40 MIN: CPT | Performed by: ORTHOPAEDIC SURGERY

## 2021-03-18 NOTE — LETTER
[address] [place] [address]  [facility] [id number]  Phone: [phone]  Fax: [phone]    Analilia Canales MD        March 18, 2021     Patient: Hudson Gupta   YOB: 1960   Date of Visit: 3/18/2021       To Whom It May Concern: It is my medical opinion that Hudson Gupta should remain out of work from 3/29/2021 through 4/15/2021. If you have any questions or concerns, please don't hesitate to call.     Sincerely,        Analilia Canales MD

## 2021-03-18 NOTE — PROGRESS NOTES
Date:  3/18/2021    Name:  Helga Lance  Address:  [address] [place] [address],[address]  [address] [address]    :  1960      Age:   64 y.o.    SSN:  [id number]      Medical Record Number:  [id number]    Reason for Visit:    Chief Complaint    Hip Pain (NP, Right hip pain. No DEB. Pt notes having pain ongoing 1 year. He has previously seen Dr. Lizet Wilson and notes he was told he needs a THR. No tx other than NSAIDs and xrays.  )      DOS:3/18/2021     HPI: Helga Lance is a 64 y.o. male here today for the right hip pain and stiffness for the past year. He had no inciting injury. He was referred to me by my colleague Dr. Ena Duncan. Pain is groin dominant worse with sleeping but certainly walking. He works in the [employer]. He has tried to avoid medications but has tried the occasional ibuprofen. He denies any back pain. No numbness tingling. Pain Assessment  Location of Pain: Pelvis(Hip)  Location Modifiers: Right  Severity of Pain: 8  Quality of Pain: Sharp  Duration of Pain: Persistent  Frequency of Pain: Intermittent  Aggravating Factors: Walking, Other (Comment), Bending, Squatting(Sleeping)  Limiting Behavior: Yes  Relieving Factors: Nsaids  Result of Injury: No  Work-Related Injury: No  Are there other pain locations you wish to document?: No  ROS: Review of systems reviewed from Patient History Form completed today and available in the patient's chart under the Media tab.        Past Medical History:   Diagnosis Date    Degenerative tear of medial meniscus of right knee 2020    Erectile dysfunction     GERD (gastroesophageal reflux disease)     Hypertension     Hypertriglyceridemia 1/10/2018    Hypothyroidism     Low testosterone     Recurrent kidney stones     Tobacco abuse 1/10/2018        Past Surgical History:   Procedure Laterality Date    APPENDECTOMY      FINGER SURGERY      HERNIA REPAIR      bilateral    KNEE ARTHROSCOPY Right 2020    RIGHT KNEE VIDEO ARTHROSCOPY, LIMITED SYNOVECTOMY FOR HOFFA'S PAD, performed by Rich Khoury MD at [facility]         Family History   Problem Relation Age of Onset    Cancer Mother 79        breast     Arthritis Mother     High Blood Pressure Father     Stroke Father     Heart Attack Father 68    Heart Disease Father     High Cholesterol Father     High Blood Pressure Brother     Other Brother 27        Testicular Cancer     Other Sister         Hypothyrodism    Arthritis Sister     Diabetes Son     Other Son         Hypothyroidism       Social History     Socioeconomic History    Marital status:      Spouse name: None    Number of children: None    Years of education: None    Highest education level: None   Occupational History    Occupation: manufacturing       Comment: [employer]   Social Needs    Financial resource strain: None    Food insecurity     Worry: None     Inability: None    Transportation needs     Medical: None     Non-medical: None   Tobacco Use    Smoking status: Former Smoker     Packs/day: 0.50     Years: 15.00     Pack years: 7.50     Types: Cigarettes     Quit date: 1/22/2018     Years since quitting: 3.1    Smokeless tobacco: Never Used   Substance and Sexual Activity    Alcohol use: Yes     Comment: weekly (1 drink of bourbon in a sitting)     Drug use: No    Sexual activity: Yes     Partners: Male   Lifestyle    Physical activity     Days per week: None     Minutes per session: None    Stress: None   Relationships    Social connections     Talks on phone: None     Gets together: None     Attends [employer] service: None     Active member of club or organization: None     Attends meetings of clubs or organizations: None     Relationship status: None    Intimate partner violence     Fear of current or ex partner: None     Emotionally abused: None     Physically abused: None     Forced sexual activity: None   Other Topics Concern    None   Social History Narrative    None       Current Outpatient Medications   Medication Sig Dispense Refill    nystatin-triamcinolone (MYCOLOG II) 755793-7.1 UNIT/GM-% cream Apply to the infected area two times a day 30 g 0    lisinopril (PRINIVIL;ZESTRIL) 20 MG tablet Take 1 tablet by mouth daily 30 tablet 3    lisinopril (PRINIVIL;ZESTRIL) 5 MG tablet Take 1 tablet by mouth daily 30 tablet 3    sildenafil (VIAGRA) 50 MG tablet Take 1 tablet by mouth daily as needed for Erectile Dysfunction 12 tablet 1    famotidine (PEPCID) 40 MG tablet Take 1 tablet by mouth every evening 90 tablet 3    ARMOUR THYROID 180 MG tablet       Vitamins B1 B6 B12 (NEURO CATHERINE PO) Take by mouth      triamcinolone (KENALOG) 0.1 % ointment Apply to the affected area on skin 2 times daily for the next 7 days 1 Tube 0    Testosterone (ANDROGEL PUMP) 20.25 MG/ACT (1.62%) GEL gel APPLY 2 PUMP ACTUATIONS (40.5 MG/2.5 GM) TOPICALLY ONCE DAILY IN THE MORNING TO THE SHOULDERS AND UPPER ARMS 75 g 2    Cholecalciferol (VITAMIN D3) 3000 UNITS TABS Take  by mouth.  magnesium 30 MG tablet Take 250 mg by mouth 2 times daily. No current facility-administered medications for this visit. Allergies   Allergen Reactions    Atorvastatin      Myalgias     Protonix [Pantoprazole Sodium] Hives       Vital signs:  Temp 97.2 °F (36.2 °C)   Resp 12   Ht 6' (1.829 m)   Wt 180 lb (81.6 kg)   BMI 24.41 kg/m²        Constitutional: The physical examination finds the patient to be well-developed and well-nourished. The patient is alert and oriented x3 and was cooperative throughout the visit. Neuro: no focal deficits noted.  Normal mood, judgement, decision making  Eyes: sclera clear, EOMI  Ears: Normal external ear  Mouth:  No perioral lesions  Pulm: Respirations unlabored and regular  Pulse: Extremities well perfused, warm, capillary refill < 2 seconds  Musculoskeletal:    Hip Examination: Right    Skin/Inspection: no skin lesions, LCEA: Greater than 50  Alpha Angle: Within normal limits  Cross over-sign is global  Other: Positive coxa Profunda, Negative Protrusio    Impression: Advanced OA of the right hip with femoral head collapse. Assessment: Patient is a 64 y.o. male end-stage OA of the right hip with bone-on-bone disease and early head collapse. Impression:  Visit Diagnoses       Codes    Primary osteoarthritis of right hip    -  Primary M16.11          Office Procedures:  No orders of the defined types were placed in this encounter. No orders of the defined types were placed in this encounter. Plan:  Pertinent imaging was reviewed. The etiology, natural history, and treatment options for the disorder were discussed. The roles of activity medication, antiinflammatories, injections, bracing, physical therapy, and surgical interventions were all described to the patient and questions were answered. We believe patient is a candidate for a right total hip replacement. It is affecting his ADLs and he has severe radiographic findings with collapse of the femoral head. Risks, benefits, advantages, disadvantages and potential complications as well as the anticipated postoperative course were discussed. The patient agreed to pursue this treatment option. All questions were addressed to their satisfaction. He will require a preoperative H&P by his family physician. We will schedule at the next available time according to his work schedule and our availability in the [facility]. All the patient's questions were answered while in the clinic. The patient is understanding of all instructions and agrees with the plan. Approximately 45 minutes were spent on patient education and coordinating care.   This visit represents high complexity given the medical decision making as well as independent interpretation of exam results and high risk given the management of chronic illnesses and potential for an elective major procedure with identifiable medical risk factors       Follow up in: No follow-ups on file. Sincerely,    Betzaida ePnny MD [address] [address] [place], [address] [place] [address], [address] [place] [address]  Email: [email or web address]  Office: [phone]    03/18/21  5:27 PM    This dictation was performed with a verbal recognition program (DRAGON) and it was checked for errors. It is possible that there are still dictated errors within this office note. If so, please bring any errors to my attention for an addendum. All efforts were made to ensure that this office note is accurate.

## 2021-03-18 NOTE — LETTER
SURGERY SCHEDULING SHEET    Today's Date: 3/18/21    Date of Surgery: 2021  Time of Surgery: 8:00 AM  Facility: [facility]  Surgeon Name: Marlon Chang   Assisting: Fellow     Date:   2021    Account: [de-identified]  Patient: iNla Blanton    : 1960  Address:  [address] [place], [place] [address]ER [facility] [address]    Procedure: Right total hip arthroplasty, direct anterior approach  Pre op H&P Yes     CPT Code: 65829  Insurance: [facility]    Diagnosis:     ICD-10-CM    1. Primary osteoarthritis of right hip  M16.11        Length of Procedure: 120  minutes  Special Equipment: Maypearl table, anterior approach retractors, noel g7 cup, avenir complete stem. Standard C-Arm:  Yes  Mini C-Arm:  No    Anesthesia:   [x] General [x] Block fascia iliaca only [] MAC [] Local [] Spinal [] Choice  Endo Sedation:    [] None [] Topical [] Other:     Patient Status:    [x] SDS (OP) [] AMA [] 23 HR OBS [] OIB  [] INPT (RM#)  PCP: DI Alves - CNP    Blood Thinner: No   Allergies:    Allergies   Allergen Reactions    Atorvastatin      Myalgias     Protonix [Pantoprazole Sodium] Hives       IMPLEMENT ATTACHED CLINICAL ORDERS FOR THIS PATIENT  MD SIGNATURE:       Marlon Chang MD  Phone:  [phone]  Fax: [phone]  [facility] 30: [phone] FX: [phone] [facility] FX: [address] [facility] 30: [phone] FX: [phone]

## 2021-03-19 ENCOUNTER — TELEPHONE (OUTPATIENT)
Dept: FAMILY MEDICINE CLINIC | Age: 61
End: 2021-03-19

## 2021-03-19 ENCOUNTER — TELEPHONE (OUTPATIENT)
Dept: ORTHOPEDIC SURGERY | Age: 61
End: 2021-03-19

## 2021-03-19 DIAGNOSIS — M16.11 PRIMARY OSTEOARTHRITIS OF RIGHT HIP: Primary | ICD-10-CM

## 2021-03-22 NOTE — TELEPHONE ENCOUNTER
CPT: 40159  AUTH: NPR  DATE RANGE: NONE  INSURANCE: BCBS  NOTE: PER FAX  LOCATION: [facility]

## 2021-03-23 ENCOUNTER — TELEPHONE (OUTPATIENT)
Dept: ORTHOPEDIC SURGERY | Age: 61
End: 2021-03-23

## 2021-03-23 NOTE — TELEPHONE ENCOUNTER
WORKING ON FMLA/APS WAITING TO HEAR BACK FROM [place] REGARDING PAPERWORK    FAXED COMPLETED FMLA/APS TO [employer] AT [employer] @ [phone]

## 2021-03-24 ENCOUNTER — OFFICE VISIT (OUTPATIENT)
Dept: FAMILY MEDICINE CLINIC | Age: 61
End: 2021-03-24
Payer: COMMERCIAL

## 2021-03-24 VITALS
WEIGHT: 181 LBS | HEART RATE: 89 BPM | OXYGEN SATURATION: 98 % | TEMPERATURE: 98 F | SYSTOLIC BLOOD PRESSURE: 138 MMHG | HEIGHT: 72 IN | BODY MASS INDEX: 24.52 KG/M2 | DIASTOLIC BLOOD PRESSURE: 80 MMHG

## 2021-03-24 DIAGNOSIS — R21 SKIN RASH: ICD-10-CM

## 2021-03-24 DIAGNOSIS — E03.9 ACQUIRED HYPOTHYROIDISM: ICD-10-CM

## 2021-03-24 DIAGNOSIS — Z01.818 PRE-OPERATIVE EXAM: Primary | ICD-10-CM

## 2021-03-24 LAB
BILIRUBIN, POC: NORMAL
BLOOD URINE, POC: NORMAL
CLARITY, POC: NORMAL
COLOR, POC: NORMAL
GLUCOSE URINE, POC: NORMAL
KETONES, POC: NORMAL
LEUKOCYTE EST, POC: NORMAL
NITRITE, POC: NORMAL
PH, POC: 5.5
PROTEIN, POC: NORMAL
SPECIFIC GRAVITY, POC: 1.02
UROBILINOGEN, POC: 0.2

## 2021-03-24 PROCEDURE — 81002 URINALYSIS NONAUTO W/O SCOPE: CPT | Performed by: NURSE PRACTITIONER

## 2021-03-24 PROCEDURE — 93000 ELECTROCARDIOGRAM COMPLETE: CPT | Performed by: NURSE PRACTITIONER

## 2021-03-24 PROCEDURE — 99243 OFF/OP CNSLTJ NEW/EST LOW 30: CPT | Performed by: NURSE PRACTITIONER

## 2021-03-24 RX ORDER — KETOCONAZOLE 20 MG/G
CREAM TOPICAL
Qty: 30 G | Refills: 0 | Status: SHIPPED | OUTPATIENT
Start: 2021-03-24

## 2021-03-24 NOTE — PROGRESS NOTES
[address].                             Preoperative Evaluation        Arlene Koyanagi  YOB: 1960    Date of Service:  3/24/2021    Vitals:    03/24/21 1345   BP: 138/80   Site: Right Upper Arm   Position: Sitting   Cuff Size: Medium Adult   Pulse: 89   Temp: 98 °F (36.7 °C)   SpO2: 98%   Weight: 181 lb (82.1 kg)   Height: 6' (1.829 m)      Wt Readings from Last 2 Encounters:   03/24/21 181 lb (82.1 kg)   03/18/21 180 lb (81.6 kg)     BP Readings from Last 3 Encounters:   03/24/21 138/80   02/22/21 120/80   08/28/20 104/70        Chief Complaint   Patient presents with    Pre-op Exam     pt is here for pre op with Dr Miles Carreon on 4/2/21 at [address] Atorvastatin      Myalgias     Protonix [Pantoprazole Sodium] Hives     Outpatient Medications Marked as Taking for the 3/24/21 encounter (Office Visit) with DI Alvarez CNP   Medication Sig Dispense Refill    ketoconazole (NIZORAL) 2 % cream Apply topically to affected area daily 30 g 0       This patient presents to the office today for a preoperative consultation at the request of surgeon, Dr. Mlies Carreon, who plans on performing right total hip arthroplasty, direct anterior approach on April 2 at [place] [address].  The current problem began months ago, and symptoms have been worsening with time. Conservative therapy: Yes: NSAIDS, rest, lifestyle modification, which has been not very effective.     Planned anesthesia: General   Known anesthesia problems: None   Bleeding risk: No recent or remote history of abnormal bleeding  Personal or FH of DVT/PE: No    Patient objection to receiving blood products: He is okay with blood and blood products     Patient Active Problem List   Diagnosis    Low testosterone    Capsulitis of foot    GERD (gastroesophageal reflux disease)    Erectile dysfunction    Elevated LDL cholesterol level    Essential hypertension    Prediabetes    Hyperlipidemia    Former smoker    Acute idiopathic gout of multiple sites    Acquired hypothyroidism    Acute pain of right knee    Degenerative tear of medial meniscus of right knee    Locked knee, right    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HCC)    Chondromalacia of right patella       Past Medical History:   Diagnosis Date    Degenerative tear of medial meniscus of right knee 7/9/2020    Erectile dysfunction     GERD (gastroesophageal reflux disease)     Hypertension     Hypertriglyceridemia 1/10/2018    Hypothyroidism     Low testosterone     Recurrent kidney stones     Tobacco abuse 1/10/2018     Past Surgical History:   Procedure Laterality Date    APPENDECTOMY      FINGER SURGERY      HERNIA REPAIR      bilateral    KNEE ARTHROSCOPY Right 8/28/2020    RIGHT KNEE VIDEO ARTHROSCOPY, LIMITED SYNOVECTOMY FOR HOFFA'S PAD, performed by Torrey Smith MD at [facility]       Family History   Problem Relation Age of Onset    Cancer Mother 79        breast     Arthritis Mother     High Blood Pressure Father     Stroke Father     Heart Attack Father 68    Heart Disease Father     High Cholesterol Father     High Blood Pressure Brother     Other Brother 27        Testicular Cancer     Other Sister         Hypothyrodism    Arthritis Sister     Diabetes Son     Other Son         Hypothyroidism     Social History     Socioeconomic History    Marital status:      Spouse name: Not on file    Number of children: Not on file    Years of education: Not on file    Highest education level: Not on file   Occupational History    Occupation: manufacturing       Comment: [employer]   Social Needs    Financial resource strain: Not on file    Food insecurity     Worry: Not on file     Inability: Not on file    Transportation needs     Medical: Not on file Non-medical: Not on file   Tobacco Use    Smoking status: Former Smoker     Packs/day: 0.50     Years: 15.00     Pack years: 7.50     Types: Cigarettes     Quit date: 1/22/2018     Years since quitting: 3.1    Smokeless tobacco: Never Used   Substance and Sexual Activity    Alcohol use: Yes     Comment: weekly (1 drink of bourbon in a sitting)     Drug use: No    Sexual activity: Yes     Partners: Male   Lifestyle    Physical activity     Days per week: Not on file     Minutes per session: Not on file    Stress: Not on file   Relationships    Social connections     Talks on phone: Not on file     Gets together: Not on file     Attends Moravian service: Not on file     Active member of club or organization: Not on file     Attends meetings of clubs or organizations: Not on file     Relationship status: Not on file    Intimate partner violence     Fear of current or ex partner: Not on file     Emotionally abused: Not on file     Physically abused: Not on file     Forced sexual activity: Not on file   Other Topics Concern    Not on file   Social History Narrative    Not on file       Review of Systems  A comprehensive review of systems was negative except for: right hip pain, right lower leg rash     Physical Exam   Constitutional: He is oriented to person, place, and time. He appears well-developed and well-nourished. No distress. HENT:   Head: Normocephalic and atraumatic. Mouth/Throat: Uvula is midline, oropharynx is clear and moist and mucous membranes are normal.   Eyes: Conjunctivae and EOM are normal. Pupils are equal, round, and reactive to light. Neck: Trachea normal and normal range of motion. Neck supple. No JVD present. Carotid bruit is not present. No mass and no thyromegaly present. Cardiovascular: Normal rate, regular rhythm, normal heart sounds and intact distal pulses. Exam reveals no gallop and no friction rub. No murmur heard.   Pulmonary/Chest: Effort normal and breath sounds the following: history of compensated or prior heart failure, history of cerebrovascular disease, DM, or renal insufficiency    Routine administration of higher-dose, long-acting metoprolol in beta-blocker-naïve patients on the day of surgery, and in the absence of dose titration is associated with an overall increase in mortality. Beta-blockers should be started days to weeks prior to surgery and titrated to pulse < 70.  5. Deep vein thrombosis prophylaxis: regimen to be chosen by surgical team  6. No contraindications to planned surgery      If you have questions, please do not hesitate to call me ([phone]).      Sincerely,                Hamida Su, CNP

## 2021-03-24 NOTE — PATIENT INSTRUCTIONS
expect to happen before your surgery. · Your picture ID will be checked. · The area of your body that needs surgery is often marked to make sure there are no errors. · You will be kept comfortable and safe by your anesthesia provider. The anesthesia may make you sleep. Or it may just numb the area being worked on. What happens when you are ready to go home? Be sure you have someone drive you home. Anesthesia and pain medicine make it unsafe for you to drive. You will get instructions about recovering from your surgery. This is called a discharge plan. It will cover things like diet, wound care, follow-up care, driving, and getting back to your normal routine. Follow-up care is a key part of your treatment and safety. Be sure to make and go to all appointments, and call your doctor if you are having problems. It's also a good idea to know your test results and keep a list of the medicines you take. Where can you learn more? Go to https://[employer]. org and sign in to your ROIÂ[employer] account. Enter Q270 in the Fragegg box to learn more about \"Learning About How to Prepare for Surgery. \"     If you do not have an account, please click on the \"Sign Up Now\" link. Current as of: May 27, 2020               Content Version: 12.8  © 2006-2021 Healthwise, Incorporated. Care instructions adapted under license by [facility] ([facility]). If you have questions about a medical condition or this instruction, always ask your healthcare professional. [place] [address] any warranty or liability for your use of this information.

## 2021-03-26 ENCOUNTER — TELEPHONE (OUTPATIENT)
Dept: ORTHOPEDIC SURGERY | Age: 61
End: 2021-03-26

## 2021-03-26 LAB — URINE CULTURE, ROUTINE: NORMAL

## 2021-03-26 NOTE — TELEPHONE ENCOUNTER
T/c. L/m on v/m to call back with questions. fmla faxed to employer on 3/23/21 by medical records.  Dates off 3/29-6/23

## 2021-03-26 NOTE — TELEPHONE ENCOUNTER
General Question     Subject: CONCERNING PAPERWORK  Patient and /or Facility Request: PATIENT  Contact Number: [phone]

## 2021-03-27 LAB — MRSA CULTURE ONLY: NORMAL

## 2021-03-29 ENCOUNTER — HOSPITAL ENCOUNTER (OUTPATIENT)
Age: 61
Discharge: HOME | End: 2021-03-29
Payer: COMMERCIAL

## 2021-03-29 ENCOUNTER — HOSPITAL ENCOUNTER (OUTPATIENT)
Dept: GENERAL RADIOLOGY | Age: 61
Discharge: HOME | End: 2021-03-29
Payer: COMMERCIAL

## 2021-03-29 DIAGNOSIS — E03.9 ACQUIRED HYPOTHYROIDISM: ICD-10-CM

## 2021-03-29 DIAGNOSIS — R91.1 NODULE OF UPPER LOBE OF RIGHT LUNG: Primary | ICD-10-CM

## 2021-03-29 DIAGNOSIS — Z01.818 PRE-OPERATIVE EXAM: ICD-10-CM

## 2021-03-29 DIAGNOSIS — Z01.818 PRE-OP EXAM: ICD-10-CM

## 2021-03-29 DIAGNOSIS — Z01.818 PRE-OP EXAM: Primary | ICD-10-CM

## 2021-03-29 LAB
A/G RATIO: 1.4 (ref 1.1–2.2)
ALBUMIN SERPL-MCNC: 4.5 G/DL (ref 3.4–5)
ALP BLD-CCNC: 137 U/L (ref 40–129)
ALT SERPL-CCNC: 37 U/L (ref 10–40)
ANION GAP SERPL CALCULATED.3IONS-SCNC: 8 MMOL/L (ref 3–16)
APTT: 29.6 SEC (ref 24.2–36.2)
AST SERPL-CCNC: 29 U/L (ref 15–37)
BASOPHILS ABSOLUTE: 0 K/UL (ref 0–0.2)
BASOPHILS RELATIVE PERCENT: 0.6 %
BILIRUB SERPL-MCNC: 1.3 MG/DL (ref 0–1)
BUN BLDV-MCNC: 18 MG/DL (ref 7–20)
CALCIUM SERPL-MCNC: 10.1 MG/DL (ref 8.3–10.6)
CHLORIDE BLD-SCNC: 103 MMOL/L (ref 99–110)
CO2: 28 MMOL/L (ref 21–32)
CREAT SERPL-MCNC: 1 MG/DL (ref 0.9–1.3)
EOSINOPHILS ABSOLUTE: 0.2 K/UL (ref 0–0.6)
EOSINOPHILS RELATIVE PERCENT: 2.9 %
GFR AFRICAN AMERICAN: >60
GFR NON-AFRICAN AMERICAN: >60
GLOBULIN: 3.2 G/DL
GLUCOSE BLD-MCNC: 101 MG/DL (ref 70–99)
HCT VFR BLD CALC: 52.4 % (ref 40.5–52.5)
HEMOGLOBIN: 17.7 G/DL (ref 13.5–17.5)
INR BLD: 0.89 (ref 0.86–1.14)
LYMPHOCYTES ABSOLUTE: 2.2 K/UL (ref 1–5.1)
LYMPHOCYTES RELATIVE PERCENT: 39.7 %
MCH RBC QN AUTO: 32 PG (ref 26–34)
MCHC RBC AUTO-ENTMCNC: 33.7 G/DL (ref 31–36)
MCV RBC AUTO: 94.9 FL (ref 80–100)
MONOCYTES ABSOLUTE: 0.4 K/UL (ref 0–1.3)
MONOCYTES RELATIVE PERCENT: 7.2 %
NEUTROPHILS ABSOLUTE: 2.8 K/UL (ref 1.7–7.7)
NEUTROPHILS RELATIVE PERCENT: 49.6 %
PDW BLD-RTO: 12.9 % (ref 12.4–15.4)
PLATELET # BLD: 239 K/UL (ref 135–450)
PMV BLD AUTO: 7.7 FL (ref 5–10.5)
POTASSIUM SERPL-SCNC: 4.7 MMOL/L (ref 3.5–5.1)
PREALBUMIN: 28.1 MG/DL (ref 20–40)
PROTHROMBIN TIME: 10.4 SEC (ref 10–13.2)
RBC # BLD: 5.52 M/UL (ref 4.2–5.9)
SARS-COV-2: NOT DETECTED
SODIUM BLD-SCNC: 139 MMOL/L (ref 136–145)
T3 TOTAL: 2.78 NG/ML (ref 0.8–2)
T4 FREE: 1.4 NG/DL (ref 0.9–1.8)
TOTAL PROTEIN: 7.7 G/DL (ref 6.4–8.2)
TSH REFLEX: 0.01 UIU/ML (ref 0.27–4.2)
WBC # BLD: 5.6 K/UL (ref 4–11)

## 2021-03-29 PROCEDURE — U0005 INFEC AGEN DETEC AMPLI PROBE: HCPCS

## 2021-03-29 PROCEDURE — 84134 ASSAY OF PREALBUMIN: CPT

## 2021-03-29 PROCEDURE — 85025 COMPLETE CBC W/AUTO DIFF WBC: CPT

## 2021-03-29 PROCEDURE — 85730 THROMBOPLASTIN TIME PARTIAL: CPT

## 2021-03-29 PROCEDURE — 83036 HEMOGLOBIN GLYCOSYLATED A1C: CPT

## 2021-03-29 PROCEDURE — 85610 PROTHROMBIN TIME: CPT

## 2021-03-29 PROCEDURE — 84443 ASSAY THYROID STIM HORMONE: CPT

## 2021-03-29 PROCEDURE — 71046 X-RAY EXAM CHEST 2 VIEWS: CPT

## 2021-03-29 PROCEDURE — 80053 COMPREHEN METABOLIC PANEL: CPT

## 2021-03-29 PROCEDURE — 84480 ASSAY TRIIODOTHYRONINE (T3): CPT

## 2021-03-29 PROCEDURE — 84439 ASSAY OF FREE THYROXINE: CPT

## 2021-03-29 PROCEDURE — U0003 INFECTIOUS AGENT DETECTION BY NUCLEIC ACID (DNA OR RNA); SEVERE ACUTE RESPIRATORY SYNDROME CORONAVIRUS 2 (SARS-COV-2) (CORONAVIRUS DISEASE [COVID-19]), AMPLIFIED PROBE TECHNIQUE, MAKING USE OF HIGH THROUGHPUT TECHNOLOGIES AS DESCRIBED BY CMS-2020-01-R: HCPCS

## 2021-03-29 PROCEDURE — 36415 COLL VENOUS BLD VENIPUNCTURE: CPT

## 2021-03-30 LAB
ESTIMATED AVERAGE GLUCOSE: 108.3 MG/DL
HBA1C MFR BLD: 5.4 %

## 2021-03-30 NOTE — PROGRESS NOTES
CONCEPCIÓN - High Risk after STOP-Bang/pt Q answered 5 questions with a yes. Pamphlet placed in chart for patient recommending follow up regarding sleep apnea.  -DB

## 2021-03-30 NOTE — PROGRESS NOTES
[address] [facility] patients having surgery or anesthesia are required to be Covid tested. You will need to quarantine from the time you are tested until your surgery. PRIOR TO PROCEDURE DATE:        1. PLEASE FOLLOW ANY  GUIDELINES/ INSTRUCTIONS PRIOR TO YOUR PROCEDURE AS ADVISED BY YOUR SURGEON. 2. Arrange for someone to drive you home and be with you for the first 24 hours after discharge for your safety after your procedure for which you received sedation. Ensure it is someone we can share information with regarding your discharge. 3. You must contact your surgeon for instructions IF:   You are taking any blood thinners, aspirin, anti-inflammatory or vitamin E.   There is a change in your physical condition such as a cold, fever, rash, cuts, sores or any other infection, especially near your surgical site. 4. Do not drink alcohol the day before or day of your procedure. 5. A Pre-op History and Physical for surgery MUST be completed by your Physician or Urgent Care within 30 days of your procedure date. Please bring a copy with you on the day of your procedure and along with any other testing performed. THE DAY OF YOUR PROCEDURE:  1. Follow instructions for ARRIVAL TIME as DIRECTED BY YOUR SURGEON. I    1. Enter the MAIN entrance from [employer] and follow the signs to the free [employer] or [employer] parking (offered free of charge 6am-5pm). 2. Enter the Main Entrance of the hospital (do not enter from the lower level of the parking garage). Upon entrance, check in with the  at the main desk on your left. If no one is available at the desk, proceed into the [facility] Waiting Room and go through the door directly into the [facility]. There is a Check-in desk ACROSS from Room 5 (marked with a sign hanging from the ceiling).  The phone number for the surgery center is [phone]. 4. Please call [phone] option #2 option #2 if you have not been preregistered yet. On the day of your procedure bring your insurance card and photo ID. You will be registered at your bedside once brought back to your room. 5. DO NOT EAT ANYTHING eight hours prior to your arrival for surgery. May have 8 ounces of water 4 hours prior to your arrival for surgery. NOTE: ALL Gastric, Bariatric and Bowel surgery patients MUST follow their surgeon's instructions. 6. MEDICATIONS    Take the following medications with a SMALL sip of water: armour thyroid   Use your usual dose of inhalers the morning of surgery. BRING your rescue inhaler with you to hospital.    Anesthesia does NOT want you to take insulin the morning of surgery. They will control your blood sugar while you are at the hospital. Please contact your ordering physician for instructions regarding your insulin the night before your procedure. If you have an insulin pump, please keep it set on basal rate. 7. Do not swallow water when brushing teeth. No gum, candy, mints or ice chips. Refrain from smoking or at least decrease the amount. 8. Dress in loose, comfortable clothing appropriate for redressing after your procedure. Do not wear jewelry (including body piercings), make-up (especially NO eye make-up), fingernail polish (NO toenail polish if foot/leg surgery), lotion, powders or metal hairclips. 9. Dentures, glasses, or contacts will need to be removed before your procedure. Bring cases for your glasses, contacts, dentures, or hearing aids to protect them while you are in surgery. 10. If you use a CPAP, please bring it with you on the day of your procedure. 11. We recommend that valuable personal  belongings such as cash, cell phones, e-tablets or jewelry, be left at home during your stay. The hospital will not be responsible for valuables that are not secured in the hospital safe.  However, if your insurance requires a co-pay, you may want to bring a method of payment, i.e. Check or credit card, if you wish to pay your co-pay the day of surgery. 12. If you are to stay overnight, you may bring a bag with personal items. Please have any large items you may need brought in by your family after your arrival to your hospital room. 15. If you have a Living Will or Durable Power of , please bring a copy on the day of your procedure. 15. With your permission, one family member may accompany you while you are being prepared for surgery. Once you are ready, additional family members may join you. HOW WE KEEP YOU SAFE and WORK TO PREVENT SURGICAL SITE INFECTIONS:  1. Health care workers should always check your ID bracelet to verify your name and birth date. You will be asked many times to state your name, date of birth, and allergies. 2. Health care workers should always clean their hands with soap or alcohol gel before providing care to you. It is okay to ask anyone if they cleaned their hands before they touch you. 3. You will be actively involved in verifying the type of procedure you are having and ensuring the correct surgical site. This will be confirmed multiple times prior to your procedure. Do NOT laura your surgery site UNLESS instructed to by your surgeon. 4. Do not shave or wax for 72 hours prior to procedure near your operative site. Shaving with a razor can irritate your skin and make it easier to develop an infection. On the day of your procedure, any hair that needs to be removed near the surgical site will be clipped by a healthcare worker using a special clippers designed to avoid skin irritation. 5. When you are in the operating room, your surgical site will be cleansed with a special soap, and in most cases, you will be given an antibiotic before the surgery begins. What to expect AFTER YOUR PROCEDURE:  1.  Immediately following your procedure, your will be taken to the PACU for the first phase of your recovery. Your nurse will help you recover from any potential side effects of anesthesia, such as extreme drowsiness, changes in your vital signs or breathing patterns. Nausea, headache, muscle aches, or sore throat may also occur after anesthesia. Your nurse will help you manage these potential side effects. 2. For comfort and safety, arrange to have someone at home with you for the first 24 hours after discharge. 3. You and your family will be given written instructions about your diet, activity, dressing care, medications, and return visits. 4. Once at home, should issues with nausea, pain, or bleeding occur, or should you notice any signs of infection, you should call your surgeon. 5. Always clean your hands before and after caring for your wound. Do not let your family touch your surgery site without cleaning their hands. 6. Narcotic pain medications can cause significant constipation. You may want to add a stool softener to your postoperative medication schedule or speak to your surgeon on how best to manage this SIDE EFFECT. SPECIAL INSTRUCTIONS reach out to surgeon regarding using a walker questions. Patient acknowledges  Understanding of pre op instructions. Thank you for allowing us to care for you. We strive to exceed your expectations in the delivery of care and service provided to you and your family. If you need to contact the [place] [address] staff for any reason, please call us at [phone]    Instructions reviewed with patient during preadmission testing phone interview. David Mendez. 3/30/2021 .11:45 AM      ADDITIONAL EDUCATIONAL INFORMATION REVIEWED PER PHONE WITH YOU AND/OR YOUR FAMILY:  No Bring a urine sample on day of surgery  Yes Hibiclens® Bathing Instructions   No Antibacterial Soap

## 2021-04-01 ENCOUNTER — ANESTHESIA EVENT (OUTPATIENT)
Dept: OPERATING ROOM | Age: 61
End: 2021-04-01
Payer: COMMERCIAL

## 2021-04-01 ENCOUNTER — OFFICE VISIT (OUTPATIENT)
Dept: ORTHOPEDIC SURGERY | Age: 61
End: 2021-04-01
Payer: COMMERCIAL

## 2021-04-01 VITALS — WEIGHT: 180 LBS | BODY MASS INDEX: 24.38 KG/M2 | HEIGHT: 72 IN

## 2021-04-01 DIAGNOSIS — M16.11 PRIMARY OSTEOARTHRITIS OF RIGHT HIP: Primary | ICD-10-CM

## 2021-04-01 DIAGNOSIS — M25.559 HIP PAIN: ICD-10-CM

## 2021-04-01 PROCEDURE — 99213 OFFICE O/P EST LOW 20 MIN: CPT | Performed by: ORTHOPAEDIC SURGERY

## 2021-04-01 RX ORDER — TRAMADOL HYDROCHLORIDE 50 MG/1
50 TABLET ORAL EVERY 6 HOURS PRN
Qty: 20 TABLET | Refills: 0 | Status: SHIPPED | OUTPATIENT
Start: 2021-04-01 | End: 2021-04-06

## 2021-04-01 RX ORDER — SENNOSIDES 8.6 MG
1 TABLET ORAL 2 TIMES DAILY
Qty: 14 TABLET | Refills: 0 | Status: SHIPPED | OUTPATIENT
Start: 2021-04-01 | End: 2021-04-08

## 2021-04-01 RX ORDER — NAPROXEN 500 MG/1
500 TABLET ORAL 2 TIMES DAILY WITH MEALS
Qty: 28 TABLET | Refills: 0 | Status: SHIPPED | OUTPATIENT
Start: 2021-04-01 | End: 2021-07-28 | Stop reason: ALTCHOICE

## 2021-04-01 RX ORDER — OXYCODONE AND ACETAMINOPHEN 10; 325 MG/1; MG/1
1 TABLET ORAL EVERY 6 HOURS PRN
Qty: 20 TABLET | Refills: 0 | Status: SHIPPED | OUTPATIENT
Start: 2021-04-01 | End: 2021-04-06

## 2021-04-01 RX ORDER — ASPIRIN 81 MG/1
81 TABLET ORAL 2 TIMES DAILY
Qty: 28 TABLET | Refills: 0 | Status: ON HOLD | OUTPATIENT
Start: 2021-04-01 | End: 2021-04-02 | Stop reason: SDUPTHER

## 2021-04-01 NOTE — PROGRESS NOTES
Chief Complaint  Follow-up (preop Right total hip 04/02/21)      History of Present Illness:  Lilia Blue is a pleasant 64 y.o. male for pre op LINK for OA and femoral head collapse. Medical History:  Patient's medications, allergies, past medical, surgical, social and family histories were reviewed and updated as appropriate. Pain Assessment  Location of Pain: Other (Comment)(right hip)  Severity of Pain: 8  ROS: Review of systems reviewed from Patient History Form completed today and available in the patient's chart under the Media tab. Pertinent items are noted in HPI  Review of systems reviewed from Patient History Form completed today and available in the patient's chart under the Media tab. Vital Signs:  Ht 6' (1.829 m)   Wt 180 lb (81.6 kg)   BMI 24.41 kg/m²         Neuro: Alert & oriented x 3,  normal,  no focal deficits noted. Normal affect. Eyes: sclera clear  Ears: Normal external ear  Mouth:  No perioral lesions  Pulm: Respirations unlabored and regular  Pulse: Extremities well perfused. 2+ peripheral pulses. Skin: Warm. No ulcerations. Constitutional: The physical examination finds the patient to be well-developed and well-nourished. The patient is alert and oriented x3 and was cooperative throughout the visit. Hip exam    Hip Examination: Right    Skin/Inspection: no skin lesions, cellulitis, or extreme edema in the lower extremities. Standing/Walking: antalgic gait, no pelvic tilt, negative Trendelenburg sign. No use of assistive devices    Sitting Exam: 5/5 Hip Flexor Strength, 5/5 Abductor Strength, 5/5 Adductor strength, Negative Straight Leg Raise    Supine Exam: Non tender around the ASIS, AIIS  Flexion arc 0 to 90deg with pain. Side Lying Exam: Non-tender at greater trochanter, abductor musculature, nor TFL origin. Abductor side leg raise 5/5 strength.  Negative OberTest    Prone Exam: Negative hip flexion contracture, internal rotation to 5 deg, external rotation to 30 deg. Non-tender at the ischial tuberosity nor proximal hamstring    Diagnostics:  Radiology:       Pertinent imaging reviewed, images only - no report available. Radiographs were obtained and reviewed in the office; 3 views: AP Pelvis and right hip 45-deg Faria View, and right False Profile View      Impression: femoral head collapse, varus MPFA, mild coxa profunda no protrusio. Narrow Miguel A femur. Assessment: Patient is a 64 y.o. male with end stage right hip OA with femoral head collapse. Impression:  Visit Diagnoses       Codes    Primary osteoarthritis of right hip    -  Primary M16.11    Hip pain     M25.559          Office Procedures:  Orders Placed This Encounter   Medications    aspirin EC 81 MG EC tablet     Sig: Take 1 tablet by mouth 2 times daily for 14 days     Dispense:  28 tablet     Refill:  0    naproxen (NAPROSYN) 500 MG tablet     Sig: Take 1 tablet by mouth 2 times daily (with meals) for 14 days     Dispense:  28 tablet     Refill:  0    oxyCODONE-acetaminophen (PERCOCET)  MG per tablet     Sig: Take 1 tablet by mouth every 6 hours as needed for Pain for up to 5 days. Dispense:  20 tablet     Refill:  0     Reduce doses taken as pain becomes manageable    senna (SENOKOT) 8.6 MG TABS tablet     Sig: Take 1 tablet by mouth 2 times daily for 7 days     Dispense:  14 tablet     Refill:  0    traMADol (ULTRAM) 50 MG tablet     Sig: Take 1 tablet by mouth every 6 hours as needed for Pain for up to 5 days. Dispense:  20 tablet     Refill:  0     Reduce doses taken as pain becomes manageable     Orders Placed This Encounter   Procedures    XR HIP RIGHT (2-3 VIEWS)     Standing Status:   Future     Number of Occurrences:   1     Standing Expiration Date:   4/1/2022       Plan:  He is still a candidate for LINK on the right hip. Schedule tomorrow.     Risks, benefits, advantages, disadvantages and potential complications as well as the anticipated postoperative course were discussed. The patient agreed to pursue this treatment option. All questions were addressed to their satisfaction. No personal history of diabetes, or blood pressure issues  No personal or family history of bleeding  No personal or family history of DVT/PE  No personal or family history of intolerance no NSAIDS or history of GI ulcers  No personal or family history of problems with Anaesthesia    Scripts given today. All the patient's questions were answered while in the clinic. The patient is understanding of all instructions and agrees with the plan. Approximately 60 minutes was spent on patient education and coordinating care. Follow up in: No follow-ups on file. Sincerely,    Thuy Medrano MD [facility]   [address] [place], [address]  Email: [email or web address]  Office: [phone]    04/01/21  2:58 PM      The encounter with Emir Beckwithlain was carried out by myself, Dr Jon Boogie, who personally examined the patient and reviewed the plan. This dictation was performed with a verbal recognition program (DRAGON) and it was checked for errors. It is possible that there are still dictated errors within this office note. If so, please bring any errors to my attention for an addendum. All efforts were made to ensure that this office note is accurate.

## 2021-04-01 NOTE — PROGRESS NOTES
[facility] / [facility] ([facility]) [address] [facility], [address]    Acknowledgment of Informed Consent for Surgical or Medical Procedure and Sedation  I agree to allow doctor(s) THE [facility]  and his/her associates or assistants, including residents and/or other qualified medical practitioner to perform the following medical treatment or procedure and to administer or direct the administration of sedation as necessary:  Procedure(s): RIGHT TOTAL HIP ARTHROPLASTY, Orase 98      My doctor has explained the following regarding the proposed procedure:   the explanation of the procedure   the benefits of the procedure   the potential problems that might occur during recuperation   the risks and side effects of the procedure which could include but are not limited to severe blood loss, infection, stroke or death   the benefits, risks and side effect of alternative procedures including the consequences of declining this procedure or any alternative procedures   the likelihood of achieving satisfactory results. I acknowledge no guarantee or assurance has been made to me regarding the results. I understand that during the course of this treatment/procedure, unforeseen conditions can occur which require an additional or different procedure. I agree to allow my physician or assistants to perform such extension of the original procedure as they may find necessary. I understand that sedation will often result in temporary impairment of memory and fine motor skills and that sedation can occasionally progress to a state of deep sedation or general anesthesia. I understand the risks of anesthesia for surgery include, but are not limited to, sore throat, hoarseness, injury to face, mouth, or teeth; nausea; headache; injury to blood vessels or nerves; death, brain damage, or paralysis.     I understand that if I have a Limitation of Treatment order in effect during my hospitalization, the order may or may not be in effect during this procedure. I give my doctor permission to give me blood or blood products. I understand that there are risks with receiving blood such as hepatitis, AIDS, fever, or allergic reaction. I acknowledge that the risks, benefits, and alternatives of this treatment have been explained to me and that no express or implied warranty has been given by the hospital, any blood bank, or any person or entity as to the blood or blood components transfused. At the discretion of my doctor, I agree to allow observers, equipment/product representatives and allow photographing, and/or televising of the procedure, provided my name or identity is maintained confidentially. I agree the hospital may dispose of or use for scientific or educational purposes any tissue, fluid, or body parts which may be removed.     ________________________________Date________Time______ am/pm  ([place] [facility])  Patient or Signature of Closest Relative or Legal Guardian    ________________________________Date________Time______am/pm      Page 1 of  1  Witness

## 2021-04-02 ENCOUNTER — HOSPITAL ENCOUNTER (OUTPATIENT)
Age: 61
Setting detail: OUTPATIENT SURGERY
Discharge: HOME | End: 2021-04-02
Attending: ORTHOPAEDIC SURGERY | Admitting: ORTHOPAEDIC SURGERY
Payer: COMMERCIAL

## 2021-04-02 ENCOUNTER — ANESTHESIA (OUTPATIENT)
Dept: OPERATING ROOM | Age: 61
End: 2021-04-02
Payer: COMMERCIAL

## 2021-04-02 ENCOUNTER — APPOINTMENT (OUTPATIENT)
Dept: GENERAL RADIOLOGY | Age: 61
End: 2021-04-02
Attending: ORTHOPAEDIC SURGERY
Payer: COMMERCIAL

## 2021-04-02 VITALS
OXYGEN SATURATION: 98 % | RESPIRATION RATE: 16 BRPM | BODY MASS INDEX: 24.38 KG/M2 | DIASTOLIC BLOOD PRESSURE: 99 MMHG | WEIGHT: 180 LBS | HEIGHT: 72 IN | HEART RATE: 92 BPM | SYSTOLIC BLOOD PRESSURE: 140 MMHG | TEMPERATURE: 97.4 F

## 2021-04-02 VITALS
TEMPERATURE: 99 F | RESPIRATION RATE: 14 BRPM | SYSTOLIC BLOOD PRESSURE: 86 MMHG | DIASTOLIC BLOOD PRESSURE: 54 MMHG | OXYGEN SATURATION: 96 %

## 2021-04-02 DIAGNOSIS — M16.11 PRIMARY OSTEOARTHRITIS OF RIGHT HIP: ICD-10-CM

## 2021-04-02 LAB
ABO/RH: NORMAL
ANTIBODY SCREEN: NORMAL
GLUCOSE BLD-MCNC: 101 MG/DL (ref 70–99)
PERFORMED ON: ABNORMAL

## 2021-04-02 PROCEDURE — 7100000000 HC PACU RECOVERY - FIRST 15 MIN: Performed by: ORTHOPAEDIC SURGERY

## 2021-04-02 PROCEDURE — 6360000002 HC RX W HCPCS: Performed by: NURSE ANESTHETIST, CERTIFIED REGISTERED

## 2021-04-02 PROCEDURE — 2500000003 HC RX 250 WO HCPCS: Performed by: NURSE ANESTHETIST, CERTIFIED REGISTERED

## 2021-04-02 PROCEDURE — 6360000002 HC RX W HCPCS: Performed by: ORTHOPAEDIC SURGERY

## 2021-04-02 PROCEDURE — 2720000010 HC SURG SUPPLY STERILE: Performed by: ORTHOPAEDIC SURGERY

## 2021-04-02 PROCEDURE — 97162 PT EVAL MOD COMPLEX 30 MIN: CPT

## 2021-04-02 PROCEDURE — 3700000000 HC ANESTHESIA ATTENDED CARE: Performed by: ORTHOPAEDIC SURGERY

## 2021-04-02 PROCEDURE — 97116 GAIT TRAINING THERAPY: CPT

## 2021-04-02 PROCEDURE — 3209999900 FLUORO FOR SURGICAL PROCEDURES

## 2021-04-02 PROCEDURE — 64450 NJX AA&/STRD OTHER PN/BRANCH: CPT | Performed by: ANESTHESIOLOGY

## 2021-04-02 PROCEDURE — 86901 BLOOD TYPING SEROLOGIC RH(D): CPT

## 2021-04-02 PROCEDURE — 86900 BLOOD TYPING SEROLOGIC ABO: CPT

## 2021-04-02 PROCEDURE — 2709999900 HC NON-CHARGEABLE SUPPLY: Performed by: ORTHOPAEDIC SURGERY

## 2021-04-02 PROCEDURE — 7100000011 HC PHASE II RECOVERY - ADDTL 15 MIN: Performed by: ORTHOPAEDIC SURGERY

## 2021-04-02 PROCEDURE — 7100000010 HC PHASE II RECOVERY - FIRST 15 MIN: Performed by: ORTHOPAEDIC SURGERY

## 2021-04-02 PROCEDURE — 97530 THERAPEUTIC ACTIVITIES: CPT

## 2021-04-02 PROCEDURE — 3700000001 HC ADD 15 MINUTES (ANESTHESIA): Performed by: ORTHOPAEDIC SURGERY

## 2021-04-02 PROCEDURE — 6360000002 HC RX W HCPCS: Performed by: ANESTHESIOLOGY

## 2021-04-02 PROCEDURE — 86850 RBC ANTIBODY SCREEN: CPT

## 2021-04-02 PROCEDURE — 7100000001 HC PACU RECOVERY - ADDTL 15 MIN: Performed by: ORTHOPAEDIC SURGERY

## 2021-04-02 PROCEDURE — 51798 US URINE CAPACITY MEASURE: CPT

## 2021-04-02 PROCEDURE — 6370000000 HC RX 637 (ALT 250 FOR IP): Performed by: ORTHOPAEDIC SURGERY

## 2021-04-02 PROCEDURE — 2580000003 HC RX 258: Performed by: ORTHOPAEDIC SURGERY

## 2021-04-02 PROCEDURE — 88311 DECALCIFY TISSUE: CPT

## 2021-04-02 PROCEDURE — 73502 X-RAY EXAM HIP UNI 2-3 VIEWS: CPT

## 2021-04-02 PROCEDURE — 3600000004 HC SURGERY LEVEL 4 BASE: Performed by: ORTHOPAEDIC SURGERY

## 2021-04-02 PROCEDURE — 2580000003 HC RX 258: Performed by: NURSE ANESTHETIST, CERTIFIED REGISTERED

## 2021-04-02 PROCEDURE — 72170 X-RAY EXAM OF PELVIS: CPT

## 2021-04-02 PROCEDURE — C1776 JOINT DEVICE (IMPLANTABLE): HCPCS | Performed by: ORTHOPAEDIC SURGERY

## 2021-04-02 PROCEDURE — 97165 OT EVAL LOW COMPLEX 30 MIN: CPT

## 2021-04-02 PROCEDURE — 6370000000 HC RX 637 (ALT 250 FOR IP): Performed by: ANESTHESIOLOGY

## 2021-04-02 PROCEDURE — 88304 TISSUE EXAM BY PATHOLOGIST: CPT

## 2021-04-02 PROCEDURE — 97535 SELF CARE MNGMENT TRAINING: CPT

## 2021-04-02 PROCEDURE — 3600000014 HC SURGERY LEVEL 4 ADDTL 15MIN: Performed by: ORTHOPAEDIC SURGERY

## 2021-04-02 PROCEDURE — 2580000003 HC RX 258: Performed by: ANESTHESIOLOGY

## 2021-04-02 DEVICE — BONE SCREW 6.5X25 SELF-TAP: Type: IMPLANTABLE DEVICE | Site: HIP | Status: FUNCTIONAL

## 2021-04-02 DEVICE — BONE SCREW 6.5X30 SELF-TAP: Type: IMPLANTABLE DEVICE | Site: HIP | Status: FUNCTIONAL

## 2021-04-02 DEVICE — HIP H2 ADV OTHER HD IMPL CAPPED H2: Type: IMPLANTABLE DEVICE | Site: HIP | Status: FUNCTIONAL

## 2021-04-02 DEVICE — G7 OSSEOTI 4 HOLE SHELL 58MM G: Type: IMPLANTABLE DEVICE | Site: HIP | Status: FUNCTIONAL

## 2021-04-02 DEVICE — LINER ACET NEUT G 40 MM VIVACIT-E G7: Type: IMPLANTABLE DEVICE | Site: HIP | Status: FUNCTIONAL

## 2021-04-02 DEVICE — BIOLOX® DELTA, CERAMIC FEMORAL HEAD, M, Ø 40/0, TAPER 12/14
Type: IMPLANTABLE DEVICE | Site: HIP | Status: FUNCTIONAL
Brand: BIOLOX® DELTA

## 2021-04-02 RX ORDER — CELECOXIB 200 MG/1
400 CAPSULE ORAL ONCE
Status: COMPLETED | OUTPATIENT
Start: 2021-04-02 | End: 2021-04-02

## 2021-04-02 RX ORDER — MAGNESIUM HYDROXIDE 1200 MG/15ML
LIQUID ORAL CONTINUOUS PRN
Status: COMPLETED | OUTPATIENT
Start: 2021-04-02 | End: 2021-04-02

## 2021-04-02 RX ORDER — ACETAMINOPHEN 500 MG
1000 TABLET ORAL ONCE
Status: COMPLETED | OUTPATIENT
Start: 2021-04-02 | End: 2021-04-02

## 2021-04-02 RX ORDER — CEFAZOLIN SODIUM 2 G/50ML
2000 SOLUTION INTRAVENOUS ONCE
Status: COMPLETED | OUTPATIENT
Start: 2021-04-02 | End: 2021-04-02

## 2021-04-02 RX ORDER — DEXAMETHASONE SODIUM PHOSPHATE 4 MG/ML
6 INJECTION, SOLUTION INTRA-ARTICULAR; INTRALESIONAL; INTRAMUSCULAR; INTRAVENOUS; SOFT TISSUE ONCE
Status: COMPLETED | OUTPATIENT
Start: 2021-04-02 | End: 2021-04-02

## 2021-04-02 RX ORDER — PREGABALIN 150 MG/1
150 CAPSULE ORAL ONCE
Status: DISCONTINUED | OUTPATIENT
Start: 2021-04-02 | End: 2021-04-02 | Stop reason: HOSPADM

## 2021-04-02 RX ORDER — SODIUM CHLORIDE 0.9 % (FLUSH) 0.9 %
10 SYRINGE (ML) INJECTION PRN
Status: DISCONTINUED | OUTPATIENT
Start: 2021-04-02 | End: 2021-04-02 | Stop reason: HOSPADM

## 2021-04-02 RX ORDER — TAMSULOSIN HYDROCHLORIDE 0.4 MG/1
0.4 CAPSULE ORAL DAILY
Qty: 7 CAPSULE | Refills: 0 | Status: SHIPPED | OUTPATIENT
Start: 2021-04-02 | End: 2022-05-06 | Stop reason: ALTCHOICE

## 2021-04-02 RX ORDER — PHENYLEPHRINE HYDROCHLORIDE 10 MG/ML
INJECTION INTRAVENOUS PRN
Status: DISCONTINUED | OUTPATIENT
Start: 2021-04-02 | End: 2021-04-02 | Stop reason: SDUPTHER

## 2021-04-02 RX ORDER — SODIUM CHLORIDE, SODIUM LACTATE, POTASSIUM CHLORIDE, CALCIUM CHLORIDE 600; 310; 30; 20 MG/100ML; MG/100ML; MG/100ML; MG/100ML
INJECTION, SOLUTION INTRAVENOUS CONTINUOUS
Status: DISCONTINUED | OUTPATIENT
Start: 2021-04-02 | End: 2021-04-02 | Stop reason: HOSPADM

## 2021-04-02 RX ORDER — MIDAZOLAM HYDROCHLORIDE 1 MG/ML
2 INJECTION INTRAMUSCULAR; INTRAVENOUS ONCE
Status: COMPLETED | OUTPATIENT
Start: 2021-04-02 | End: 2021-04-02

## 2021-04-02 RX ORDER — LABETALOL HYDROCHLORIDE 5 MG/ML
5 INJECTION, SOLUTION INTRAVENOUS EVERY 10 MIN PRN
Status: DISCONTINUED | OUTPATIENT
Start: 2021-04-02 | End: 2021-04-02 | Stop reason: HOSPADM

## 2021-04-02 RX ORDER — PROCHLORPERAZINE EDISYLATE 5 MG/ML
5 INJECTION INTRAMUSCULAR; INTRAVENOUS
Status: DISCONTINUED | OUTPATIENT
Start: 2021-04-02 | End: 2021-04-02 | Stop reason: HOSPADM

## 2021-04-02 RX ORDER — FENTANYL CITRATE 50 UG/ML
INJECTION, SOLUTION INTRAMUSCULAR; INTRAVENOUS PRN
Status: DISCONTINUED | OUTPATIENT
Start: 2021-04-02 | End: 2021-04-02 | Stop reason: SDUPTHER

## 2021-04-02 RX ORDER — MEPERIDINE HYDROCHLORIDE 25 MG/ML
12.5 INJECTION INTRAMUSCULAR; INTRAVENOUS; SUBCUTANEOUS EVERY 5 MIN PRN
Status: DISCONTINUED | OUTPATIENT
Start: 2021-04-02 | End: 2021-04-02 | Stop reason: HOSPADM

## 2021-04-02 RX ORDER — SODIUM CHLORIDE 0.9 % (FLUSH) 0.9 %
10 SYRINGE (ML) INJECTION EVERY 12 HOURS SCHEDULED
Status: DISCONTINUED | OUTPATIENT
Start: 2021-04-02 | End: 2021-04-02 | Stop reason: HOSPADM

## 2021-04-02 RX ORDER — SODIUM CHLORIDE, SODIUM LACTATE, POTASSIUM CHLORIDE, CALCIUM CHLORIDE 600; 310; 30; 20 MG/100ML; MG/100ML; MG/100ML; MG/100ML
INJECTION, SOLUTION INTRAVENOUS CONTINUOUS PRN
Status: DISCONTINUED | OUTPATIENT
Start: 2021-04-02 | End: 2021-04-02 | Stop reason: SDUPTHER

## 2021-04-02 RX ORDER — ROPIVACAINE HYDROCHLORIDE 5 MG/ML
30 INJECTION, SOLUTION EPIDURAL; INFILTRATION; PERINEURAL ONCE
Status: DISCONTINUED | OUTPATIENT
Start: 2021-04-02 | End: 2021-04-02 | Stop reason: HOSPADM

## 2021-04-02 RX ORDER — NEOSTIGMINE METHYLSULFATE 5 MG/5 ML
SYRINGE (ML) INTRAVENOUS PRN
Status: DISCONTINUED | OUTPATIENT
Start: 2021-04-02 | End: 2021-04-02 | Stop reason: SDUPTHER

## 2021-04-02 RX ORDER — ROCURONIUM BROMIDE 10 MG/ML
INJECTION, SOLUTION INTRAVENOUS PRN
Status: DISCONTINUED | OUTPATIENT
Start: 2021-04-02 | End: 2021-04-02 | Stop reason: SDUPTHER

## 2021-04-02 RX ORDER — DEXAMETHASONE SODIUM PHOSPHATE 10 MG/ML
10 INJECTION, SOLUTION INTRAMUSCULAR; INTRAVENOUS ONCE
Status: COMPLETED | OUTPATIENT
Start: 2021-04-02 | End: 2021-04-02

## 2021-04-02 RX ORDER — DIPHENHYDRAMINE HYDROCHLORIDE 50 MG/ML
12.5 INJECTION INTRAMUSCULAR; INTRAVENOUS
Status: DISCONTINUED | OUTPATIENT
Start: 2021-04-02 | End: 2021-04-02 | Stop reason: HOSPADM

## 2021-04-02 RX ORDER — ONDANSETRON 2 MG/ML
INJECTION INTRAMUSCULAR; INTRAVENOUS PRN
Status: DISCONTINUED | OUTPATIENT
Start: 2021-04-02 | End: 2021-04-02 | Stop reason: SDUPTHER

## 2021-04-02 RX ORDER — LIDOCAINE HYDROCHLORIDE 10 MG/ML
1 INJECTION, SOLUTION EPIDURAL; INFILTRATION; INTRACAUDAL; PERINEURAL
Status: DISCONTINUED | OUTPATIENT
Start: 2021-04-02 | End: 2021-04-02 | Stop reason: HOSPADM

## 2021-04-02 RX ORDER — FENTANYL CITRATE 50 UG/ML
100 INJECTION, SOLUTION INTRAMUSCULAR; INTRAVENOUS ONCE
Status: DISCONTINUED | OUTPATIENT
Start: 2021-04-02 | End: 2021-04-02 | Stop reason: HOSPADM

## 2021-04-02 RX ORDER — ONDANSETRON 2 MG/ML
4 INJECTION INTRAMUSCULAR; INTRAVENOUS
Status: DISCONTINUED | OUTPATIENT
Start: 2021-04-02 | End: 2021-04-02 | Stop reason: HOSPADM

## 2021-04-02 RX ORDER — GABAPENTIN 300 MG/1
300 CAPSULE ORAL ONCE
Status: COMPLETED | OUTPATIENT
Start: 2021-04-02 | End: 2021-04-02

## 2021-04-02 RX ORDER — LIDOCAINE HYDROCHLORIDE 20 MG/ML
INJECTION, SOLUTION INTRAVENOUS PRN
Status: DISCONTINUED | OUTPATIENT
Start: 2021-04-02 | End: 2021-04-02 | Stop reason: SDUPTHER

## 2021-04-02 RX ORDER — OXYCODONE HCL 20 MG/1
20 TABLET, FILM COATED, EXTENDED RELEASE ORAL ONCE
Status: COMPLETED | OUTPATIENT
Start: 2021-04-02 | End: 2021-04-02

## 2021-04-02 RX ORDER — HYDRALAZINE HYDROCHLORIDE 20 MG/ML
5 INJECTION INTRAMUSCULAR; INTRAVENOUS EVERY 10 MIN PRN
Status: DISCONTINUED | OUTPATIENT
Start: 2021-04-02 | End: 2021-04-02 | Stop reason: HOSPADM

## 2021-04-02 RX ORDER — OXYCODONE HYDROCHLORIDE AND ACETAMINOPHEN 5; 325 MG/1; MG/1
2 TABLET ORAL PRN
Status: COMPLETED | OUTPATIENT
Start: 2021-04-02 | End: 2021-04-02

## 2021-04-02 RX ORDER — ROPIVACAINE HYDROCHLORIDE 2 MG/ML
INJECTION, SOLUTION EPIDURAL; INFILTRATION; PERINEURAL
Status: COMPLETED | OUTPATIENT
Start: 2021-04-02 | End: 2021-04-02

## 2021-04-02 RX ORDER — GLYCOPYRROLATE 1 MG/5 ML
SYRINGE (ML) INTRAVENOUS PRN
Status: DISCONTINUED | OUTPATIENT
Start: 2021-04-02 | End: 2021-04-02 | Stop reason: SDUPTHER

## 2021-04-02 RX ORDER — PROPOFOL 10 MG/ML
INJECTION, EMULSION INTRAVENOUS PRN
Status: DISCONTINUED | OUTPATIENT
Start: 2021-04-02 | End: 2021-04-02 | Stop reason: SDUPTHER

## 2021-04-02 RX ORDER — ASPIRIN 81 MG/1
81 TABLET ORAL 2 TIMES DAILY
Qty: 28 TABLET | Refills: 0 | Status: SHIPPED | OUTPATIENT
Start: 2021-04-02 | End: 2022-05-06 | Stop reason: ALTCHOICE

## 2021-04-02 RX ORDER — OXYCODONE HYDROCHLORIDE AND ACETAMINOPHEN 5; 325 MG/1; MG/1
1 TABLET ORAL PRN
Status: COMPLETED | OUTPATIENT
Start: 2021-04-02 | End: 2021-04-02

## 2021-04-02 RX ORDER — TAMSULOSIN HYDROCHLORIDE 0.4 MG/1
0.4 CAPSULE ORAL ONCE
Status: COMPLETED | OUTPATIENT
Start: 2021-04-02 | End: 2021-04-02

## 2021-04-02 RX ORDER — FENTANYL CITRATE 50 UG/ML
25 INJECTION, SOLUTION INTRAMUSCULAR; INTRAVENOUS EVERY 5 MIN PRN
Status: DISCONTINUED | OUTPATIENT
Start: 2021-04-02 | End: 2021-04-02 | Stop reason: HOSPADM

## 2021-04-02 RX ORDER — FENTANYL CITRATE 50 UG/ML
50 INJECTION, SOLUTION INTRAMUSCULAR; INTRAVENOUS EVERY 5 MIN PRN
Status: DISCONTINUED | OUTPATIENT
Start: 2021-04-02 | End: 2021-04-02 | Stop reason: HOSPADM

## 2021-04-02 RX ADMIN — ROCURONIUM BROMIDE 20 MG: 10 INJECTION INTRAVENOUS at 10:14

## 2021-04-02 RX ADMIN — DEXAMETHASONE SODIUM PHOSPHATE 6 MG: 4 INJECTION, SOLUTION INTRAMUSCULAR; INTRAVENOUS at 13:05

## 2021-04-02 RX ADMIN — PHENYLEPHRINE HYDROCHLORIDE 100 MCG: 10 INJECTION INTRAVENOUS at 11:01

## 2021-04-02 RX ADMIN — ROCURONIUM BROMIDE 50 MG: 10 INJECTION INTRAVENOUS at 08:34

## 2021-04-02 RX ADMIN — TAMSULOSIN HYDROCHLORIDE 0.4 MG: 0.4 CAPSULE ORAL at 18:48

## 2021-04-02 RX ADMIN — Medication 0.8 MG: at 11:09

## 2021-04-02 RX ADMIN — SODIUM CHLORIDE, SODIUM LACTATE, POTASSIUM CHLORIDE, AND CALCIUM CHLORIDE: .6; .31; .03; .02 INJECTION, SOLUTION INTRAVENOUS at 09:07

## 2021-04-02 RX ADMIN — SODIUM CHLORIDE, SODIUM LACTATE, POTASSIUM CHLORIDE, AND CALCIUM CHLORIDE: .6; .31; .03; .02 INJECTION, SOLUTION INTRAVENOUS at 06:51

## 2021-04-02 RX ADMIN — MIDAZOLAM HYDROCHLORIDE 2 MG: 2 INJECTION, SOLUTION INTRAMUSCULAR; INTRAVENOUS at 07:50

## 2021-04-02 RX ADMIN — ACETAMINOPHEN 1000 MG: 500 TABLET, FILM COATED ORAL at 06:54

## 2021-04-02 RX ADMIN — PHENYLEPHRINE HYDROCHLORIDE 50 MCG: 10 INJECTION INTRAVENOUS at 08:32

## 2021-04-02 RX ADMIN — PHENYLEPHRINE HYDROCHLORIDE 50 MCG: 10 INJECTION INTRAVENOUS at 10:16

## 2021-04-02 RX ADMIN — ONDANSETRON 4 MG: 2 INJECTION INTRAMUSCULAR; INTRAVENOUS at 11:09

## 2021-04-02 RX ADMIN — CELECOXIB 400 MG: 200 CAPSULE ORAL at 06:54

## 2021-04-02 RX ADMIN — ROPIVACAINE HYDROCHLORIDE 50 ML: 2 INJECTION, SOLUTION EPIDURAL; INFILTRATION at 08:15

## 2021-04-02 RX ADMIN — PHENYLEPHRINE HYDROCHLORIDE 100 MCG: 10 INJECTION INTRAVENOUS at 08:13

## 2021-04-02 RX ADMIN — PHENYLEPHRINE HYDROCHLORIDE 50 MCG: 10 INJECTION INTRAVENOUS at 08:47

## 2021-04-02 RX ADMIN — Medication 4 MG: at 11:09

## 2021-04-02 RX ADMIN — OXYCODONE HYDROCHLORIDE 20 MG: 20 TABLET, FILM COATED, EXTENDED RELEASE ORAL at 06:54

## 2021-04-02 RX ADMIN — PHENYLEPHRINE HYDROCHLORIDE 50 MCG: 10 INJECTION INTRAVENOUS at 09:57

## 2021-04-02 RX ADMIN — PROPOFOL 200 MG: 10 INJECTION, EMULSION INTRAVENOUS at 08:04

## 2021-04-02 RX ADMIN — ROCURONIUM BROMIDE 50 MG: 10 INJECTION INTRAVENOUS at 08:05

## 2021-04-02 RX ADMIN — CEFAZOLIN SODIUM 2 G: 2 SOLUTION INTRAVENOUS at 08:12

## 2021-04-02 RX ADMIN — PHENYLEPHRINE HYDROCHLORIDE 100 MCG: 10 INJECTION INTRAVENOUS at 10:35

## 2021-04-02 RX ADMIN — DEXAMETHASONE SODIUM PHOSPHATE 10 MG: 10 INJECTION, SOLUTION INTRAMUSCULAR; INTRAVENOUS at 07:24

## 2021-04-02 RX ADMIN — PHENYLEPHRINE HYDROCHLORIDE 50 MCG: 10 INJECTION INTRAVENOUS at 09:27

## 2021-04-02 RX ADMIN — PHENYLEPHRINE HYDROCHLORIDE 50 MCG: 10 INJECTION INTRAVENOUS at 10:32

## 2021-04-02 RX ADMIN — FENTANYL CITRATE 100 MCG: 50 INJECTION, SOLUTION INTRAMUSCULAR; INTRAVENOUS at 07:50

## 2021-04-02 RX ADMIN — SODIUM CHLORIDE, POTASSIUM CHLORIDE, SODIUM LACTATE AND CALCIUM CHLORIDE: 600; 310; 30; 20 INJECTION, SOLUTION INTRAVENOUS at 07:24

## 2021-04-02 RX ADMIN — LIDOCAINE HYDROCHLORIDE 50 MG: 20 INJECTION, SOLUTION INTRAVENOUS at 08:04

## 2021-04-02 RX ADMIN — GABAPENTIN 300 MG: 300 CAPSULE ORAL at 06:54

## 2021-04-02 RX ADMIN — OXYCODONE HYDROCHLORIDE AND ACETAMINOPHEN 2 TABLET: 5; 325 TABLET ORAL at 16:33

## 2021-04-02 ASSESSMENT — PULMONARY FUNCTION TESTS
PIF_VALUE: 16
PIF_VALUE: 0
PIF_VALUE: 17
PIF_VALUE: 16
PIF_VALUE: 16
PIF_VALUE: 15
PIF_VALUE: 15
PIF_VALUE: 17
PIF_VALUE: 15
PIF_VALUE: 15
PIF_VALUE: 0
PIF_VALUE: 17
PIF_VALUE: 17
PIF_VALUE: 16
PIF_VALUE: 17
PIF_VALUE: 16
PIF_VALUE: 13
PIF_VALUE: 16
PIF_VALUE: 0
PIF_VALUE: 15
PIF_VALUE: 14
PIF_VALUE: 17
PIF_VALUE: 17
PIF_VALUE: 0
PIF_VALUE: 17
PIF_VALUE: 16
PIF_VALUE: 16
PIF_VALUE: 17
PIF_VALUE: 16
PIF_VALUE: 17
PIF_VALUE: 1
PIF_VALUE: 16
PIF_VALUE: 15
PIF_VALUE: 17
PIF_VALUE: 16
PIF_VALUE: 16
PIF_VALUE: 19
PIF_VALUE: 16
PIF_VALUE: 17
PIF_VALUE: 16
PIF_VALUE: 16
PIF_VALUE: 17
PIF_VALUE: 13
PIF_VALUE: 15
PIF_VALUE: 13
PIF_VALUE: 16
PIF_VALUE: 15
PIF_VALUE: 16
PIF_VALUE: 16
PIF_VALUE: 17
PIF_VALUE: 16
PIF_VALUE: 15
PIF_VALUE: 17
PIF_VALUE: 18
PIF_VALUE: 16
PIF_VALUE: 17
PIF_VALUE: 17
PIF_VALUE: 16
PIF_VALUE: 17
PIF_VALUE: 16
PIF_VALUE: 23
PIF_VALUE: 18
PIF_VALUE: 16
PIF_VALUE: 15
PIF_VALUE: 17
PIF_VALUE: 16
PIF_VALUE: 17
PIF_VALUE: 16
PIF_VALUE: 17
PIF_VALUE: 17
PIF_VALUE: 15
PIF_VALUE: 17
PIF_VALUE: 18
PIF_VALUE: 16
PIF_VALUE: 15
PIF_VALUE: 16
PIF_VALUE: 17
PIF_VALUE: 15
PIF_VALUE: 17
PIF_VALUE: 17
PIF_VALUE: 15

## 2021-04-02 ASSESSMENT — PAIN SCALES - GENERAL
PAINLEVEL_OUTOF10: 1
PAINLEVEL_OUTOF10: 0
PAINLEVEL_OUTOF10: 1
PAINLEVEL_OUTOF10: 0
PAINLEVEL_OUTOF10: 7
PAINLEVEL_OUTOF10: 3

## 2021-04-02 ASSESSMENT — LIFESTYLE VARIABLES: SMOKING_STATUS: 0

## 2021-04-02 ASSESSMENT — PAIN - FUNCTIONAL ASSESSMENT: PAIN_FUNCTIONAL_ASSESSMENT: 0-10

## 2021-04-02 ASSESSMENT — PAIN DESCRIPTION - PAIN TYPE: TYPE: SURGICAL PAIN

## 2021-04-02 ASSESSMENT — PAIN DESCRIPTION - ORIENTATION: ORIENTATION: RIGHT

## 2021-04-02 ASSESSMENT — PAIN DESCRIPTION - LOCATION
LOCATION: HIP
LOCATION: HIP

## 2021-04-02 ASSESSMENT — PAIN DESCRIPTION - FREQUENCY: FREQUENCY: CONTINUOUS

## 2021-04-02 ASSESSMENT — PAIN DESCRIPTION - DESCRIPTORS: DESCRIPTORS: ACHING;THROBBING

## 2021-04-02 NOTE — PROGRESS NOTES
PACU Transfer to [facility]  Pt's Current Allergies: Atorvastatin and Protonix [pantoprazole sodium]    Pt meets criteria to transfer to next phase of care per HARSHA SCORE and ASPAN standards    Recent Labs     04/02/21  0719   POCGLU 101*       Vitals:    04/02/21 1345   BP: 122/97   Pulse: 108   Resp: 14   Temp: 97   SpO2: 97%      BP within 20% of pt's admitting BP as per Harsha Score      Intake/Output Summary (Last 24 hours) at 4/2/2021 1443  Last data filed at 4/2/2021 1345  Gross per 24 hour   Intake 1828 ml   Output 250 ml   Net 1578 ml         Pain assessment:  None=denied pain when asked  Pain Level: 1    Patient was assessed for unknown alterations to skin integrity. There were not unknown alterations observed. Patient transferred to care of Chito Hopkins RN.   Family updated and directed to Chito Hopkins    4/2/2021 2:43 PM

## 2021-04-02 NOTE — H&P
Lilia Box    [id number]    [facility] Same Day Surgery Update H & P  Department of General Surgery   Surgical Service   Pre-operative History and Physical  Last H & P within the last 30 days. DIAGNOSIS:   Primary osteoarthritis of right hip [M16.11]    Procedure(s):  RIGHT TOTAL HIP ARTHROPLASTY, DIRECT ANTERIOR APPROACH    HISTORY OF PRESENT ILLNESS:   Patient is a 64 y.o. male with chronic right hip pain and limited ROM in the setting of arthrosis. The symptoms have been recalcitrant to conservative treatment and the patient presents today for the above procedure. Covid 19:  Patient denies fever, chills, cough or known exposure to Covid-19.        Past Medical History:        Diagnosis Date    Degenerative tear of medial meniscus of right knee 7/9/2020    Erectile dysfunction     GERD (gastroesophageal reflux disease)     Hypertension     Hypertriglyceridemia 1/10/2018    Hypothyroidism     Low testosterone     Recurrent kidney stones     Tobacco abuse 1/10/2018     Past Surgical History:        Procedure Laterality Date    APPENDECTOMY      FINGER SURGERY Left     left index finger     HERNIA REPAIR      bilateral    KNEE ARTHROSCOPY Right 8/28/2020    RIGHT KNEE VIDEO ARTHROSCOPY, LIMITED SYNOVECTOMY FOR HOFFA'S PAD, performed by Arnol Weems MD at [facility]       Past Social History:  Social History     Socioeconomic History    Marital status:      Spouse name: None    Number of children: None    Years of education: None    Highest education level: None   Occupational History    Occupation: manufacturing       Comment: [employer]   Social Needs    Financial resource strain: None    Food insecurity     Worry: None     Inability: None    Transportation needs     Medical: None     Non-medical: None   Tobacco Use    Smoking status: Current Some Day Smoker     Packs/day: 0.00     Years: 15.00     Pack years: 0.00 Types: Cigars    Smokeless tobacco: Never Used    Tobacco comment: occassional cigar   Substance and Sexual Activity    Alcohol use: Yes     Comment: weekly (1 drink of bourbon in a sitting)     Drug use: No    Sexual activity: Yes     Partners: Male   Lifestyle    Physical activity     Days per week: None     Minutes per session: None    Stress: None   Relationships    Social connections     Talks on phone: None     Gets together: None     Attends Mu-ism service: None     Active member of club or organization: None     Attends meetings of clubs or organizations: None     Relationship status: None    Intimate partner violence     Fear of current or ex partner: None     Emotionally abused: None     Physically abused: None     Forced sexual activity: None   Other Topics Concern    None   Social History Narrative    None         Medications Prior to Admission:      Prior to Admission medications    Medication Sig Start Date End Date Taking? Authorizing Provider   ketoconazole (NIZORAL) 2 % cream Apply topically to affected area daily 3/24/21  Yes DI Mclain CNP   lisinopril (PRINIVIL;ZESTRIL) 20 MG tablet Take 1 tablet by mouth daily 1/12/21  Yes DI Mclain CNP   lisinopril (PRINIVIL;ZESTRIL) 5 MG tablet Take 1 tablet by mouth daily 1/12/21  Yes DI Mclain CNP   famotidine (PEPCID) 40 MG tablet Take 1 tablet by mouth every evening 8/21/20  Yes DI Mclain CNP   ARMOUR THYROID 180 MG tablet daily  5/4/20  Yes Historical Provider, MD   Testosterone (ANDROGEL PUMP) 20.25 MG/ACT (1.62%) GEL gel APPLY 2 PUMP ACTUATIONS (40.5 MG/2.5 GM) TOPICALLY ONCE DAILY IN THE MORNING TO THE SHOULDERS AND UPPER ARMS 8/29/17  Yes Deb Gallegos DO   Cholecalciferol (VITAMIN D3) 3000 UNITS TABS Take  by mouth.    Yes Historical Provider, MD   aspirin EC 81 MG EC tablet Take 1 tablet by mouth 2 times daily for 14 days 4/1/21 4/15/21  Kavita Reza MD

## 2021-04-02 NOTE — PROGRESS NOTES
65 Spoke with Dr. Naga Lyons with orders noted and instructed pt that he will receive one Flomax tonight at 0.4mg and then Dr. Naga Lyons is calling in prescription to pharmacy to take daily as prescribed and go to the ED if any complications with urination with verbalization of understanding of pt and S.O. after explaining. 18 Spoke with Dr. Katarina Johnson concerning unable to urinate d/t Perfect serve stating Dr. Bray Hands on call and he states 'he will contact Dr. Naga Lyons'     1745 Left  for [facility] concerning pt unable pt unable to urinate with Bladder scan of >468mL and pt /99 and HR 92 Resp 16 and pt does states 'feels a little like he has to void' with bladder soft and not distended and no discomfort with gentle palpation. 1625 Pt attempting to void without success after drinking a 1000ml water and coffee and eating sandwich. Pt states pain in right hip \"7\" in pain. Will monitor with call light in reach and pt states 'doesn't feel need to void yet'. 1600 Pt drinking water and one cup of coffee with [place] [facility] sandwich box lunch provided after pt attempts to void with no success and states 'doesn't feel like he has to void at this time'    1510 Pt has not voided with DC instructions given to pt and S. O. with verbalization of understanding of pt and S.O. and noted that pt has to void prior to discharge with verbalization of understanding. Pt denies any need for pain medicine when offered. 1445 Pt drinking water and pt with drsg to R hip D&I with <3 sec cap refill and +2palp pedal pulse with call light in reach.

## 2021-04-02 NOTE — PROGRESS NOTES
Ambulatory Surgery/Procedure Discharge Note    Vitals:    04/02/21 1740   BP: (!) 140/99   Pulse: 92   Resp: 16   Temp:    SpO2: 98%     Elevated BP meets dipak Score criteria Temp 97.4    In: 2500 [P.O.:2500]  Out: 550 [Urine:550]    Restroom use offered before discharge. Yes  Straight cath performed per protocol sterile technique with 550mL cloudy yellow urine return and tolerated well. Pain assessment:  level of pain (1-10, 10 severe),   Pain Level: 0    Reinforced DC instructions with pt and S. O. with no further questions indicated with verbalization of understanding. Drsg to R hip dry and intact with no drainage noted  With <3sec cap refill and +2palp pedal pulse. Pt tolerating po well and both pt and S.O. states 'ready to go home'    Patient discharged to home/self care.  Patient discharged via wheel chair by transporter to waiting family/S.O.       4/2/2021 7:09 PM

## 2021-04-02 NOTE — PROGRESS NOTES
Patient arrived from OR to PACU # 12 s/p RIGHT TOTAL HIP ARTHROPLASTY, DIRECT ANTERIOR APPROACH (Right Hip) per [facility]. Attached to PACU monitoring device, report received from CRNA who reported patient received pre-op block and was otherwise hemodynamically stable intraoperatively. Patient arrived drowsy but wakeful @ times, denied pain when asked, VSS.

## 2021-04-02 NOTE — PROGRESS NOTES
Physical Therapy    Facility/Department: [facility] GENERAL SURGERY  Initial Assessment and DISCHARGE    NAME: Haley Honeycutt  : 1960  MRN: [id number]    Date of Service: 2021    Discharge Recommendations:  Haley Honeycutt scored a 18/24 on the AM-PAC short mobility form. At this time, no further PT is recommended upon discharge. Recommend patient returns to prior setting with prior services. PT Equipment Recommendations  Equipment Needed: Yes(Rolling walker)    Assessment   Assessment: Pt from home s/p right THR. Pt doing well from PT stanpdoint, demonstrating good transfers and steady gait with walker. Recommend pt use backwards technique on curb step as practiced in therapy session. Pt in agreement with plan. Pt will need a rolling walker for home. Pt has support from his significant other and voices no concerns returning home today. Decision Making: Medium Complexity  Patient Education: Role of PT, hip precuations, rolling walker, recommendation for stairs and d/c recommendations. Pt verbalized and demonstrated learning. REQUIRES PT FOLLOW UP: No         Restrictions  WBAT, anterior precautions     Vision/Hearing  Vision: Within Functional Limits(night vision glasses)  Hearing: Within functional limits       Subjective  Chart Reviewed: Yes  Patient assessed for rehabilitation services?: Yes  Additional Pertinent Hx: Pt s/p right THR, anterior approach. PMH:  R knee meniscus tear, HTN, ED, GERD  Diagnosis: R hip OA    Subjective  Subjective: Pt found supine in bed. Pt pleasant and ready for PT. \"I will need a walker. I didn't know what kind to get? \"  Pain Screening  Patient Currently in Pain: Denies    Orientation  Within Functional Limits    Social/Functional History  Lives With: Significant other  Type of Home: House  Home Layout: Two level, Able to Live on Main level with bedroom/bathroom  Home Access: Stairs to enter with rails  Entrance Stairs - Number of Steps: 2  Bathroom Shower/Tub: Tub/Shower unit  Bathroom Toilet: Standard  Bathroom Equipment: Hand-held shower, Toilet raiser  Home Equipment: Long-handled shoehorn, Sock aid, Crutches, Reacher, Cane  ADL Assistance: Independent  Homemaking Assistance: Independent  Homemaking Responsibilities: Yes  Ambulation Assistance: Independent  Transfer Assistance: Independent  Active : Yes  Occupation: Full time employment  Type of occupation: pharmacy  Additional Comments: no falls to report      Objective  Strength  Strength RLE: WFL  Strength LLE: WFL    Bed mobility  Supine to Sit: Stand by assistance(verbal cues)  Sit to Supine: Contact guard assistance     Transfers  Sit to Stand: Stand by assistance(to walker, cues for safety)  Stand to sit: Stand by assistance(cues for safety)     Ambulation  Device: Rolling Walker  Assistance: Contact guard assistance  Quality of Gait: step-to gait, steady  Gait Deviations: Slow Radha;Decreased step length;Decreased step height  Distance: 100ft x2    Stairs  # Steps : 3(curb steps)  Device: Rolling walker  Assistance: Contact guard assistance  Comment: Pt initially ambulated up curb step going fowards and R knee near buckled, needing Min-Mod A for safety. Pt then ambulated up/down curb step 2 more times using backwards technique and CGA. Recommened pt using backwards technique at home and pt vebralized understanding.     Balance  Sitting - Static: Good  Sitting - Dynamic: Good  Standing - Static: Good  Standing - Dynamic: Good(using rolling walker for ambulation)    Treatment included gait and transfer training with patient education     Plan: Discharge acute PT      Safety Devices  Type of devices: Left in bed, Call light within reach, Nurse notified(RN at bedside)      AM-PAC Score  AM-PAC Inpatient Mobility Raw Score : 18 (04/02/21 1509)  AM-PAC Inpatient T-Scale Score : 43.63 (04/02/21 1509)  Mobility Inpatient CMS 0-100% Score: 46.58 (04/02/21 1509)  Mobility Inpatient CMS G-Code Modifier : CK (04/02/21 1509)    Therapy Time   Individual Concurrent Group Co-treatment   Time In 1347         Time Out 1427         Minutes 40         Timed Code Treatment Minutes:  25  Total Treatment Minutes:  40      Ely Corrales PT

## 2021-04-02 NOTE — BRIEF OP NOTE
Brief Postoperative Note      Patient: Tessie Rodríguez  YOB: 1960  MRN: [id number]    Date of Procedure: 4/2/2021    Pre-Op Diagnosis: Primary osteoarthritis of right hip [M16.11]    Post-Op Diagnosis: Same       Procedure(s):  RIGHT TOTAL HIP ARTHROPLASTY, DIRECT ANTERIOR APPROACH    Surgeon(s):  MD Sridhar Whiteside MD Eulogio Larsen, MD    Assistant:  Surgical Assistant: Chet Blount    Anesthesia: General    Estimated Blood Loss (mL): 883     Complications: None    Specimens:   ID Type Source Tests Collected by Time Destination   A : FEMORAL HEAD  Bone Bone SURGICAL PATHOLOGY Iva Arias MD 4/2/2021 4119        Implants:  * No implants in log *      Drains: * No LDAs found *    Findings: Avascular necrosis of the head.       Electronically signed by Iva Arias MD on 4/2/2021 at 11:32 AM

## 2021-04-02 NOTE — PROGRESS NOTES
Anesthesia here to do block. O2 placed. Start OKVX:0683  Stop time:0756  Tolerated block well with sedation by Dr Kelleen Phalen    See flow sheet for vital signs.

## 2021-04-02 NOTE — PROGRESS NOTES
Passed PT/OT, still needs to void. Stated he thinks he can go. Spoke to partner who wishes to stay in car because he has dog. Left msg on SDS communication tool. Patient denies pain, VSS.

## 2021-04-02 NOTE — ANESTHESIA PROCEDURE NOTES
Peripheral Block    Patient location during procedure: pre-op  Start time: 4/2/2021 7:50 AM  End time: 4/2/2021 7:53 AM  Staffing  Performed: anesthesiologist   Anesthesiologist: Jenn Rowley MD  Preanesthetic Checklist  Completed: patient identified, IV checked, site marked, risks and benefits discussed, surgical consent, monitors and equipment checked, pre-op evaluation, timeout performed, anesthesia consent given, oxygen available and patient being monitored  Peripheral Block  Patient position: supine  Prep: ChloraPrep  Patient monitoring: cardiac monitor, continuous pulse ox, frequent blood pressure checks and IV access  Block type: Fascia iliaca  Laterality: right  Injection technique: single-shot  Guidance: ultrasound guided  Infiltration strength: 1 %  Dose: 3 mL  Provider prep: mask and sterile gloves  Needle  Needle type: combined needle/nerve stimulator   Needle gauge: 21 G  Needle length: 10 cm  Needle localization: ultrasound guidance  Assessment  Injection assessment: negative aspiration for heme, no paresthesia on injection and local visualized surrounding nerve on ultrasound  Paresthesia pain: none  Slow fractionated injection: yes  Hemodynamics: stable  Additional Notes  Immediately prior to procedure a \"time out\" was called to verify the correct patient, allergies, laterality, procedure and equipment. Time out performed with [facility] RN    Local Anesthetic: 0.25 %  ropivacaine   Amount: 50 ml  in 5 ml increments after negative aspiration each time. Iliopsoas Muscle and Fascia Iliaca, Femoral artery (Deep artery to the thigh take off), Femoral Vein and Femoral Nerve are identified; the tip of the need and the spread of the local anesthetic around the Femoral nerve are visualized. The Femoral nerve appeared to be anatomically normal and there were no abnormal pathologically findings seen.          Medications Administered  Ropivacaine (NAROPIN) injection 0.2%, 50 mL  Reason for block: post-op pain

## 2021-04-02 NOTE — PROGRESS NOTES
Occupational Therapy   Occupational Therapy Initial Assessment, Tx, D/C  Date: 2021   Patient Name: Hafsa Nunez  MRN: [id number]     : 1960    Date of Service: 2021    Discharge Recommendations: Hafsa Nunez scored a 18/24 on the AM-PAC ADL Inpatient form. Current research shows that an AM-PAC score of 18 or greater is typically associated with a discharge to the patient's home setting. Based on the patient's AM-PAC score, and their current ADL deficits, it is recommended that the patient have 2-3 sessions per week of Occupational Therapy at d/c to increase the patient's independence. At this time, this patient demonstrates the endurance and safety to discharge home with (home vs OP services) and a follow up treatment frequency of 2-3x/wk. Please see assessment section for further patient specific details. If patient discharges prior to next session this note will serve as a discharge summary. Please see below for the latest assessment towards goals. OT Equipment Recommendations  Equipment Needed: No    Assessment   Assessment: Pt from home with partner, reporting prev ind with fxl mobility and transfers, ADL. Pt currently requires CGA with fxl mobility using RW, no significant LOB with ambulation, one LOB with first attempt on steps, assist to correct. No LOB with further attempts. Pt assist for LB dressing, reporting has sock aid, reacher at home and assist from partner. Pt with no further acute care OT needs at this time. D/C acute OT. Prognosis: Good  Decision Making: Low Complexity  OT Education: OT Role;Plan of Care;Precautions; ADL Adaptive Strategies;Transfer Training  Patient Education: verb understanding  REQUIRES OT FOLLOW UP: No  Activity Tolerance  Activity Tolerance: Patient Tolerated treatment well           Patient Diagnosis(es): The encounter diagnosis was Primary osteoarthritis of right hip.     has a past medical history of Degenerative tear of medial meniscus of right knee, Erectile dysfunction, GERD (gastroesophageal reflux disease), Hypertension, Hypertriglyceridemia, Hypothyroidism, Low testosterone, Recurrent kidney stones, and Tobacco abuse.   has a past surgical history that includes Appendectomy; hernia repair; Lithotripsy; Vasectomy; Finger surgery (Left); and Knee arthroscopy (Right, 8/28/2020). Restrictions  Position Activity Restriction  Other position/activity restrictions: WBAT, anterior precautions    Subjective   General  Chart Reviewed: Yes  Patient assessed for rehabilitation services?: Yes  Additional Pertinent Hx: Patient is a 64 y.o. male with chronic right hip pain and limited ROM in the setting of arthrosis.   RIGHT TOTAL HIP ARTHROPLASTY, DIRECT ANTERIOR APPROACH (Right Hip)  Referring Practitioner: Isela Kendall MD  Diagnosis: Primary osteoarthritis of right hip  Subjective  Subjective: Pt in bed upon arrival, agreeable to therapy session, reporting feeling \"groggy\"  Patient Currently in Pain: Denies  Pain Assessment  Pain Assessment: 0-10  Pain Level: 1  Vital Signs  Temp: 97 °F (36.1 °C)  Temp Source: Temporal  Pulse: 108  Heart Rate Source: Monitor  Resp: 14  BP: (!) 122/97  MAP (mmHg): 105  Patient Position: Semi fowlers  Level of Consciousness: Alert (0)  MEWS Score: 1  Patient Currently in Pain: Denies  Oxygen Therapy  SpO2: 97 %  O2 Device: None (Room air)  Social/Functional History  Social/Functional History  Lives With: Significant other  Type of Home: House  Home Layout: Two level, Able to Live on Main level with bedroom/bathroom  Home Access: Stairs to enter with rails  Entrance Stairs - Number of Steps: 2  Bathroom Shower/Tub: Tub/Shower unit  Bathroom Toilet: Standard  Bathroom Equipment: Hand-held shower, Toilet raiser  Home Equipment: Long-handled shoehorn, Sock aid, Crutches, Reacher, Cane  ADL Assistance: Independent  Homemaking Assistance: Independent  Homemaking Responsibilities: Yes  Ambulation Assistance: Minutes 39              Timed Code Treatment Minutes:   25    Total Treatment Minutes:  [address], MOT, OTR/L

## 2021-04-02 NOTE — ANESTHESIA POSTPROCEDURE EVALUATION
Department of Anesthesiology  Postprocedure Note    Patient: Hafsa Nunez  MRN: [id number]  YOB: 1960  Date of evaluation: 4/2/2021  Time:  1:40 PM     Procedure Summary     Date: 04/02/21 Room / Location: [address]    Anesthesia Start: 0800 Anesthesia Stop: 1104    Procedure: RIGHT TOTAL HIP ARTHROPLASTY, DIRECT ANTERIOR APPROACH (Right Hip) Diagnosis:       Primary osteoarthritis of right hip      (Primary osteoarthritis of right hip [M16.11])    Surgeons: Marilyn Stewart MD Responsible Provider: Avi Valdovinos MD    Anesthesia Type: general ASA Status: 2          Anesthesia Type: general    Juni Phase I: Juni Score: 7    Juni Phase II:      Last vitals: Reviewed and per EMR flowsheets.        Anesthesia Post Evaluation    Patient location during evaluation: PACU  Patient participation: complete - patient participated  Level of consciousness: awake and alert  Pain score: 2  Airway patency: patent  Nausea & Vomiting: no nausea and no vomiting  Complications: no  Cardiovascular status: hemodynamically stable  Respiratory status: acceptable  Hydration status: stable

## 2021-04-02 NOTE — ANESTHESIA PRE PROCEDURE
Department of Anesthesiology  Preprocedure Note       Name:  Dima Wilkins   Age:  64 y.o.  :  1960                                          MRN:  [id number]         Date:  2021      Surgeon: Corinthia Goodpasture):  Felipa Segura MD    Procedure: Procedure(s):  RIGHT TOTAL HIP ARTHROPLASTY, DIRECT ANTERIOR APPROACH    Medications prior to admission:   Prior to Admission medications    Medication Sig Start Date End Date Taking? Authorizing Provider   ketoconazole (NIZORAL) 2 % cream Apply topically to affected area daily 3/24/21  Yes Bard Soda, APRN - CNP   lisinopril (PRINIVIL;ZESTRIL) 20 MG tablet Take 1 tablet by mouth daily 21  Yes Bard Soda, APRN - CNP   lisinopril (PRINIVIL;ZESTRIL) 5 MG tablet Take 1 tablet by mouth daily 21  Yes Bard Soda, APRN - CNP   famotidine (PEPCID) 40 MG tablet Take 1 tablet by mouth every evening 20  Yes Bard Soda, APRN - CNP   ARMOUR THYROID 180 MG tablet daily  20  Yes Historical Provider, MD   Testosterone (ANDROGEL PUMP) 20.25 MG/ACT (1.62%) GEL gel APPLY 2 PUMP ACTUATIONS (40.5 MG/2.5 GM) TOPICALLY ONCE DAILY IN THE MORNING TO THE SHOULDERS AND UPPER ARMS 17  Yes Deb Lorenzo DO   Cholecalciferol (VITAMIN D3) 3000 UNITS TABS Take  by mouth. Yes Historical Provider, MD   aspirin EC 81 MG EC tablet Take 1 tablet by mouth 2 times daily for 14 days 4/1/21 4/15/21  Felipa Segura MD   naproxen (NAPROSYN) 500 MG tablet Take 1 tablet by mouth 2 times daily (with meals) for 14 days 4/1/21 4/15/21  Felipa Segura MD   oxyCODONE-acetaminophen (PERCOCET)  MG per tablet Take 1 tablet by mouth every 6 hours as needed for Pain for up to 5 days. 21  Felipa Segura MD   senna (SENOKOT) 8.6 MG TABS tablet Take 1 tablet by mouth 2 times daily for 7 days 21  Felipa Segura MD   traMADol (ULTRAM) 50 MG tablet Take 1 tablet by mouth every 6 hours as needed for Pain for up to 5 days.  21 4/6/21  Jan Monique MD   sildenafil (VIAGRA) 50 MG tablet Take 1 tablet by mouth daily as needed for Erectile Dysfunction 12/14/20   DI Bennett - CNP   Vitamins B1 B6 B12 (NEURO CATHERINE PO) Take by mouth    Historical Provider, MD   magnesium 30 MG tablet Take 250 mg by mouth 2 times daily.     Historical Provider, MD       Current medications:    Current Facility-Administered Medications   Medication Dose Route Frequency Provider Last Rate Last Admin    lactated ringers infusion   Intravenous Continuous Jan Moniuqe MD        ceFAZolin (ANCEF) 2000 mg in dextrose 3 % 50 mL IVPB (duplex)  2,000 mg Intravenous Once Jan Monique MD        gabapentin (NEURONTIN) capsule 300 mg  300 mg Oral Once Jan Monique MD        celecoxib (CELEBREX) capsule 400 mg  400 mg Oral Once Jan Monique MD        pregabalin (LYRICA) capsule 150 mg  150 mg Oral Once Jan Monique MD        acetaminophen (TYLENOL) tablet 1,000 mg  1,000 mg Oral Once Jan Monique MD        dexamethasone (PF) (DECADRON) injection 10 mg  10 mg Intravenous Once Jan Monique MD        oxyCODONE (OXYCONTIN) extended release tablet 20 mg  20 mg Oral Once Jan Monique MD        ortho mix (with morphine) injection   Injection On Call Jan Monique MD        tranexamic acid (CYKLOKAPRON) 1,000 mg in sodium chloride 0.9 % 50 mL IVPB  1,000 mg Intravenous Once Jan Monique MD        lactated ringers infusion   Intravenous Continuous Eros Hooper MD        sodium chloride flush 0.9 % injection 10 mL  10 mL Intravenous 2 times per day Eros Hooper MD        sodium chloride flush 0.9 % injection 10 mL  10 mL Intravenous PRN Eros Hooper MD        lidocaine PF 1 % injection 1 mL  1 mL Intradermal Once PRN Eros Hooper MD        ropivacaine (NAROPIN) 0.5% injection 30 mL  30 mL Infiltration Once Winter Cespedes MD        fentaNYL (SUBLIMAZE) injection 100 mcg  100 mcg Intravenous Once Sea Campo MD        midazolam (VERSED) injection 2 mg  2 mg Intravenous Once Sea Campo MD           Allergies:     Allergies   Allergen Reactions    Atorvastatin      Myalgias     Protonix [Pantoprazole Sodium] Hives       Problem List:    Patient Active Problem List   Diagnosis Code    Low testosterone R79.89    Capsulitis of foot M77.8    GERD (gastroesophageal reflux disease) K21.9    Erectile dysfunction N52.9    Elevated LDL cholesterol level E78.00    Essential hypertension I10    Prediabetes R73.03    Hyperlipidemia E78.5    Former smoker Z87.891    Acute idiopathic gout of multiple sites M10.09    Acquired hypothyroidism E03.9    Acute pain of right knee M25.561    Degenerative tear of medial meniscus of right knee M23.203    Locked knee, right M23.91    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    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       Past Medical History:        Diagnosis Date    Degenerative tear of medial meniscus of right knee 7/9/2020    Erectile dysfunction     GERD (gastroesophageal reflux disease)     Hypertension     Hypertriglyceridemia 1/10/2018    Hypothyroidism     Low testosterone     Recurrent kidney stones     Tobacco abuse 1/10/2018       Past Surgical History:        Procedure Laterality Date    APPENDECTOMY      FINGER SURGERY Left     left index finger     HERNIA REPAIR      bilateral    KNEE ARTHROSCOPY Right 8/28/2020    RIGHT KNEE VIDEO ARTHROSCOPY, LIMITED SYNOVECTOMY FOR HOFFA'S PAD, performed by Christo Woo MD at [facility]         Social History:    Social History     Tobacco Use    Smoking status: Current Some Day Smoker     Packs/day: 0.00     Years: 15.00     Pack years: 0.00     Types: Cigars    Smokeless tobacco: Never Used    Tobacco comment: occassional cigar   Substance Use Topics    Alcohol use: Yes     Comment: weekly (1 drink of bourbon in a sitting) Ready to quit: Not Answered  Counseling given: Not Answered  Comment: occassional cigar      Vital Signs (Current):   Vitals:    03/29/21 1458 03/30/21 1130 04/02/21 0559   BP:   (!) 146/100   Pulse:   83   Resp:   18   Temp:   97.3 °F (36.3 °C)   TempSrc:   Oral   SpO2:   94%   Weight: 181 lb (82.1 kg) 180 lb (81.6 kg) 180 lb (81.6 kg)   Height: 6' (1.829 m) 6' (1.829 m) 6' (1.829 m)                                              BP Readings from Last 3 Encounters:   04/02/21 (!) 146/100   03/24/21 138/80   02/22/21 120/80       NPO Status:                                                                                 BMI:   Wt Readings from Last 3 Encounters:   04/02/21 180 lb (81.6 kg)   04/01/21 180 lb (81.6 kg)   03/24/21 181 lb (82.1 kg)     Body mass index is 24.41 kg/m². CBC:   Lab Results   Component Value Date    WBC 5.6 03/29/2021    RBC 5.52 03/29/2021    HGB 17.7 03/29/2021    HCT 52.4 03/29/2021    MCV 94.9 03/29/2021    RDW 12.9 03/29/2021     03/29/2021       CMP:   Lab Results   Component Value Date     03/29/2021    K 4.7 03/29/2021     03/29/2021    CO2 28 03/29/2021    BUN 18 03/29/2021    CREATININE 1.0 03/29/2021    GFRAA >60 03/29/2021    AGRATIO 1.4 03/29/2021    LABGLOM >60 03/29/2021    GLUCOSE 101 03/29/2021    PROT 7.7 03/29/2021    CALCIUM 10.1 03/29/2021    BILITOT 1.3 03/29/2021    ALKPHOS 137 03/29/2021    AST 29 03/29/2021    ALT 37 03/29/2021       POC Tests: No results for input(s): POCGLU, POCNA, POCK, POCCL, POCBUN, POCHEMO, POCHCT in the last 72 hours. Coags:   Lab Results   Component Value Date    PROTIME 10.4 03/29/2021    INR 0.89 03/29/2021    APTT 29.6 03/29/2021       HCG (If Applicable): No results found for: PREGTESTUR, PREGSERUM, HCG, HCGQUANT     ABGs: No results found for: PHART, PO2ART, FKE4NKE, YCL3GIQ, BEART, J7RDQOKW     Type & Screen (If Applicable):  No results found for: LABABO, LABRH    Drug/Infectious Status (If Applicable):   No

## 2021-04-05 ENCOUNTER — OFFICE VISIT (OUTPATIENT)
Dept: ORTHOPEDIC SURGERY | Age: 61
End: 2021-04-05

## 2021-04-05 ENCOUNTER — HOSPITAL ENCOUNTER (OUTPATIENT)
Dept: PHYSICAL THERAPY | Age: 61
Setting detail: THERAPIES SERIES
Discharge: HOME | End: 2021-04-05
Payer: COMMERCIAL

## 2021-04-05 VITALS — WEIGHT: 180 LBS | HEIGHT: 72 IN | TEMPERATURE: 97.4 F | BODY MASS INDEX: 24.38 KG/M2 | RESPIRATION RATE: 12 BRPM

## 2021-04-05 DIAGNOSIS — M16.11 PRIMARY OSTEOARTHRITIS OF RIGHT HIP: Primary | ICD-10-CM

## 2021-04-05 DIAGNOSIS — Z96.641 S/P HIP REPLACEMENT, RIGHT: ICD-10-CM

## 2021-04-05 PROCEDURE — 97110 THERAPEUTIC EXERCISES: CPT

## 2021-04-05 PROCEDURE — 97161 PT EVAL LOW COMPLEX 20 MIN: CPT

## 2021-04-05 PROCEDURE — 99024 POSTOP FOLLOW-UP VISIT: CPT | Performed by: ORTHOPAEDIC SURGERY

## 2021-04-05 NOTE — PROGRESS NOTES
History of Present Illness:  Emir Jane is a pleasant 64 y.o. male who presents for a post operative visit. He is 3 days out out following a right total hip arthroplasty, DAA. Overall He is doing okay and feels that their pain is well controlled with current pain medications. He has been compliant with the anterior precuation instructions  tHe plans to do physical therapy at [facility]. He denies fevers, chills, numbness, tingling, and shortness of breath. Medical History:  Patient's medications, allergies, past medical, surgical, social and family histories were reviewed and updated as appropriate. No notes on file    Review of Systems  A 14 point review of systems was completed by the patient and is available in the media section of the scanned medical record and was reviewed on 4/5/2021. Vital Signs:  Vitals:    04/05/21 1304   Resp: 12   Temp: 97.4 °F (36.3 °C)       General/Appearance: Alert and oriented and in no apparent distress. Skin:  There are no skin lesions, cellulitis, or extreme edema. The patient has warm and well-perfused Bilateral lower  extremities with brisk capillary refill. Hip  Exam:    Inspection: Hip incision(s)  are clean, dry and intact and well approximated. The Prineo dressing is still in place. Mild ecchymosis and swelling are present as can be expected. There is no erythema, drainage or other signs of infection    Palpation:  No crepitus to gentle motion / circumduction of the hip    Active Range of Motion: Deferred    Passive Range of Motion:  0 to 90deg flexion with no pain    Strength:  Deferred    Special Tests:  Deferred. Neurovascular: Sensation to light touch is intact, no motor deficits, palpable radial pulses 2+    Radiology:     Plain radiographs of the RIGHT hip comprising 2 views (AP Pelvis and right hip lateral) were obtained and reviewed in the office: Shows postsurgical changes from the hip replacement.  No evidence of acute fracture or complications. Impression: Stable postop x-ray. Assessment :  Mr. Trini Jordan is a pleasant 64 y.o. patient who is 3 days out following right hip replacement, DAA. He is doing great with no acute concerns. Impression:  Encounter Diagnosis   Name Primary?  Primary osteoarthritis of right hip Yes       Office Procedures:  Orders Placed This Encounter   Procedures    XR HIP RIGHT (2-3 VIEWS)     Standing Status:   Future     Number of Occurrences:   1     Standing Expiration Date:   4/5/2022       Treatment Plan:    Overall Trini Jordan is doing well. The pain is well-controlled. We recommend that He commence with physical therapy for timeline based progression of motion, and strengthening. The patient was told that she is restricted from driving for at least 4weeks postop. They were advised to continue their ASA, NSAIDs, and Jadon-Hose compression stockings for 14 days post surgery. All of his questions were fully answered today. We would like to see Trini Jordan back in 2 weeks for follow-up visit and x-rays. Sincerely,    John Portillo MD [facility] and [address] [place] [address]   [address] [facility], [address]  Email: [email or web address]  Office: [phone]    04/05/21  1:42 PM

## 2021-04-05 NOTE — PLAN OF CARE
[address] [facility], [address] [facility] Alicia Castellanos, [address] [facility] [address]  Phone: [phone]   Fax:     [phone]       Physical Therapy Certification    Dear Referring Practitioner: Dr. Bradley Lou,    We had the pleasure of evaluating the following patient for physical therapy services at [address] [place] [address]. A summary of our findings can be found in the initial assessment below. This includes our plan of care. If you have any questions or concerns regarding these findings, please do not hesitate to contact me at the office phone number checked above. Thank you for the referral.       Physician Signature:_______________________________Date:__________________  By signing above (or electronic signature), therapists plan is approved by physician      Patient: Dima Wilkins   : 1960   MRN: [id number]  Referring Physician: Referring Practitioner: Dr. Bradley Lou      Evaluation Date: 2021      Medical Diagnosis Information:  Diagnosis: M16.11 primary OA R hip, M25.551 R hip pain   Treatment Diagnosis: M25.551 R hip pain                                         Insurance information:  BCBS     Precautions/ Contra-indications: Anterior hip precautions      C-SSRS Triggered by Intake questionnaire (Past 2 wk assessment):   [x] No, Questionnaire did not trigger screening.   [] Yes, Patient intake triggered further evaluation      [] C-SSRS Screening completed  [] PCP notified via Plan of Care  [] Emergency services notified     Latex Allergy:  [x]NO      []YES  Preferred Language for Healthcare:   [x]English       []other:    SUBJECTIVE: Patient stated complaint: Pt reports chronic R hip pain however pain has ? over the last year. Pt's x-rays and MD appts were delayed last year due to the pandemic however pt is now s/p R LINK on 21.  Pt noted he had R knee surgery with minimal observation. Intake form has been scanned into medical record. Patient has been instructed to contact their primary care physician regarding ROS issues if not already being addressed at this time. Co-morbidities/Complexities (which will affect course of rehabilitation):   [x]None           Arthritic conditions   []Rheumatoid arthritis (M05.9)  []Osteoarthritis (M19.91)   Cardiovascular conditions   []Hypertension (I10)  []Hyperlipidemia (E78.5)  []Angina pectoris (I20)  []Atherosclerosis (I70)   Musculoskeletal conditions   []Disc pathology   []Congenital spine pathologies   []Prior surgical intervention  []Osteoporosis (M81.8)  []Osteopenia (M85.8)   Endocrine conditions   []Hypothyroid (E03.9)  []Hyperthyroid Gastrointestinal conditions   []Constipation (G79.67)   Metabolic conditions   []Morbid obesity (E66.01)  []Diabetes type 1(E10.65) or 2 (E11.65)   []Neuropathy (G60.9)     Pulmonary conditions   []Asthma (J45)  []Coughing   []COPD (J44.9)   Psychological Disorders  []Anxiety (F41.9)  []Depression (F32.9)   []Other:   []Other:          Barriers to/and or personal factors that will affect rehab potential:              []Age  []Sex              []Motivation/Lack of Motivation                        []Co-Morbidities              []Cognitive Function, education/learning barriers              []Environmental, home barriers              []profession/work barriers  []past PT/medical experience  []other:  Justification: none    Falls Risk Assessment (30 days):   [x] Falls Risk assessed and no intervention required.   [] Falls Risk assessed and Patient requires intervention due to being higher risk   TUG score (>12s at risk):     [] Falls education provided, including       ASSESSMENT:   Functional Impairments:     [x]Noted lumbar/proximal hip/LE joint hypomobility   [x]Decreased LE functional ROM   [x]Decreased core/proximal hip strength and neuromuscular control   [x]Decreased LE functional strength   [x]Reduced balance/proprioceptive control   []other:      Functional Activity Limitations (from functional questionnaire and intake)   [x]Reduced ability to tolerate prolonged functional positions   [x]Reduced ability or difficulty with changes of positions or transfers between positions   [x]Reduced ability to maintain good posture and demonstrate good body mechanics with sitting, bending, and lifting   []Reduced ability to sleep   [x] Reduced ability or tolerance with driving and/or computer work   [x]Reduced ability to perform lifting, carrying tasks   [x]Reduced ability to squat   [x]Reduced ability to forward bend   [x]Reduced ability to ambulate prolonged functional periods/distances/surfaces   [x]Reduced ability to ascend/descend stairs   []Reduced ability to run, hop, cut or jump   []other:    Participation Restrictions   [x]Reduced participation in self care activities   [x]Reduced participation in home management activities   [x]Reduced participation in work activities   [x]Reduced participation in social activities. []Reduced participation in sport/recreation activities. Classification :    [x]Signs/symptoms consistent with post-surgical status including decreased ROM, strength and function.    []Signs/symptoms consistent with joint sprain/strain   []Signs/symptoms consistent with patella-femoral syndrome   []Signs/symptoms consistent with knee OA/hip OA   []Signs/symptoms consistent with internal derangement of knee/Hip   []Signs/symptoms consistent with functional hip weakness/NMR control      []Signs/symptoms consistent with tendinitis/tendinosis    []signs/symptoms consistent with pathology which may benefit from Dry needling      []other:      Prognosis/Rehab Potential:      []Excellent   [x]Good    []Fair   []Poor    Tolerance of evaluation/treatment:    []Excellent   [x]Good    []Fair   []Poor    Physical Therapy Evaluation Complexity Justification  [x] A history of present problem with:  [x] no personal per patient tolerance, in order to prevent re-injury. [] Progressing: [] Met: [] Not Met: [] Adjusted     2. Patient will have a decrease in pain to facilitate improvement in movement, function, and ADLs as indicated by Functional Deficits. [] Progressing: [] Met: [] Not Met: [] Adjusted     Long Term Goals: To be achieved in: 12 weeks  1. Disability index score of 35% or less for the LEFS to assist with reaching prior level of function. [] Progressing: [] Met: [] Not Met: [] Adjusted     2. Patient will demonstrate increased AROM to WNL to allow for proper joint functioning as indicated by patients Functional Deficits. [] Progressing: [] Met: [] Not Met: [] Adjusted     3. Patient will demonstrate an increase in Strength to good proximal hip strength and control, to 5/5 in LE to allow for proper functional mobility as indicated by patients Functional Deficits. [] Progressing: [] Met: [] Not Met: [] Adjusted     4. Patient will return to all functional activities without increased symptoms or restriction. [] Progressing: [] Met: [] Not Met: [] Adjusted     5.  Pt will amb with no gait deviation. (patient specific functional goal)    [] Progressing: [] Met: [] Not Met: [] Adjusted      Electronically signed by:  Stephon Baptiste PT

## 2021-04-05 NOTE — FLOWSHEET NOTE
[address] and [address] [place] [address], [address] [place] [address] [address]  Phone: [phone]   Fax:     [phone]      Physical Therapy Treatment Note/ Progress Report:     Date:  2021    Patient Name:  Trini Jordan    :  1960  MRN: [id number]  Restrictions/Precautions:    Medical/Treatment Diagnosis Information:  Diagnosis: M16.11 primary OA R hip, M25.551 R hip pain  Treatment Diagnosis: M25.551 R hip pain  Insurance/Certification information:   [facility]  Physician Information:  Referring Practitioner: Dr. Alannah Mayer  Has the plan of care been signed (Y/N):        []  Yes  [x]  No     Date of Patient follow up with Physician: check NV    Is this a Progress Report:     []  Yes  [x]  No      If Yes:  Date Range for reporting period:  Beginning:  ------------ Ending:     Progress report will be due (10 Rx or 30 days whichever is less):        Recertification will be due (POC Duration  / 90 days whichever is less): 21        Visit # Insurance Allowable Auth Required   In Person 1 100 []  Yes     [x]  No    Tele Health   []  Yes     []  No    Total 1       Functional Scale: LEFS: 78%    Date assessed:  21      Latex Allergy:  [x]NO      []YES  Preferred Language for Healthcare:   [x]English       []other:    Pain level:  2/10     SUBJECTIVE:  See eval    OBJECTIVE: See eval   Observation:    Test measurements:      RESTRICTIONS/PRECAUTIONS: anterior hip precautions    Exercises/Interventions:   Therapeutic Ex (39141) Sets/sec Reps Notes/CUES HEP   Ankle pumps 1 30  x   Supine hip ADD iso 5\" 15  x   Supine ABD iso 5\" 10  x   glute set 5\" 20  x   Supine hip ABD 1 10  x   Standing hip ABD 1 10  x   Standing HR 2 10  x                               Pt edu: HEP, dx, POC, ice       Manual Intervention (11663)       NV                                          NMR re-education (47322)   CUES NEEDED Therapeutic Activity (95434)                                          JethroData access code: [email or web address]           Therapeutic Exercise and NMR EXR  [x] (08104) Provided verbal/tactile cueing for activities related to strengthening, flexibility, endurance, ROM for improvements in LE, proximal hip, and core control with self care, mobility, lifting, ambulation. [x] (95991) Provided verbal/tactile cueing for activities related to improving balance, coordination, kinesthetic sense, posture, motor skill, proprioception to assist with LE, proximal hip, and core control in self-care, mobility, lifting, ambulation and eccentric single leg control.      NMR and Therapeutic Activities:    [x] (10078 or 30138) Provided verbal/tactile cueing for activities related to improving balance, coordination, kinesthetic sense, posture, motor skill, proprioception and motor activation to allow for proper function of core, proximal hip and LE with self-care and ADLs and functional mobility.   [] (68517) Gait Re-education- Provided training and instruction to the patient for proper LE, core and proximal hip recruitment and positioning and eccentric body weight control with ambulation re-education including up and down stairs     Home Exercise Program:    [x] (46139) Reviewed/Progressed HEP activities related to strengthening, flexibility, endurance, ROM of core, proximal hip and LE for functional self-care, mobility, lifting and ambulation/stair navigation   [] (29316) Reviewed/Progressed HEP activities related to improving balance, coordination, kinesthetic sense, posture, motor skill, proprioception of core, proximal hip and LE for self-care, mobility, lifting, and ambulation/stair navigation      Manual Treatments:  PROM / STM / Oscillations-Mobs:  G-I, II, III, IV (PA's, Inf., Post.)  [] (05673) Provided manual therapy to mobilize LE, proximal hip and/or LS spine soft tissue/joints for the purpose of modulating pain, promoting relaxation, increasing ROM, reducing/eliminating soft tissue swelling/inflammation/restriction, improving soft tissue extensibility and allowing for proper ROM for normal function with self-care, mobility, lifting and ambulation. Modalities:  Declined ice   [] GAME READY (VASO)- for significant edema, swelling, pain control. Charges:  Timed Code Treatment Minutes: 25   Total Treatment Minutes:  45   BWC:  TE TIME:  NMR TIME:  MANUAL TIME:  UNTIMED MINUTES:  Medicare Total:                 [x] EVAL (LOW) 20237 (typically 20 minutes face-to-face)  [] EVAL (MOD) 08046 (typically 30 minutes face-to-face)  [] EVAL (HIGH) 97400 (typically 45 minutes face-to-face)  [] RE-EVAL     [x] AE(34559) x   2  [] IONTO  [] NMR (23210) x     [] VASO  [] Manual (80051) x     [] Other:  [] TA x      [] Mech Traction (68088)  [] ES(attended) (61067)      [] ES (un) (93223):    ASSESSMENT:  See eval    GOALS:     Patient stated goal: increased movement     Therapist goals for Patient:   Short Term Goals: To be achieved in: 2 weeks  1. Independent in HEP and progression per patient tolerance, in order to prevent re-injury. []? Progressing: []? Met: []? Not Met: []? Adjusted      2. Patient will have a decrease in pain to facilitate improvement in movement, function, and ADLs as indicated by Functional Deficits. []? Progressing: []? Met: []? Not Met: []? Adjusted      Long Term Goals: To be achieved in: 12 weeks  1. Disability index score of 35% or less for the LEFS to assist with reaching prior level of function. []? Progressing: []? Met: []? Not Met: []? Adjusted      2. Patient will demonstrate increased AROM to WNL to allow for proper joint functioning as indicated by patients Functional Deficits. []? Progressing: []? Met: []? Not Met: []? Adjusted      3.  Patient will demonstrate an increase in Strength to good proximal hip strength and control, to 5/5 in LE to allow for proper functional mobility as indicated by patients Functional Deficits. []? Progressing: []? Met: []? Not Met: []? Adjusted      4. Patient will return to all functional activities without increased symptoms or restriction. []? Progressing: []? Met: []? Not Met: []? Adjusted      5. Pt will amb with no gait deviation. (patient specific functional goal)    []? Progressing: []? Met: []? Not Met: []? Adjusted          Access Code: [email or web address]  URL: [email or web address]  Date: 04/05/2021  Prepared by: Shabana Viveros    Exercises  Supine Gluteal Sets - 2 x daily - 7 x weekly - 1 sets - 10 reps - 10 sec hold  Supine Hip Abduction - 2 x daily - 7 x weekly - 2 sets - 10 reps  Hooklying Isometric Clamshell - 1 x daily - 7 x weekly - 1 sets - 15 reps - 5 sec hold  Supine Hip Adduction Isometric with Ball - 1 x daily - 7 x weekly - 1 sets - 10 reps - 10 sec hold  Standing Hip Abduction with Counter Support - 1 x daily - 7 x weekly - 10 reps - 3 sets  Standing Heel Raise with Support - 1 x daily - 7 x weekly - 10 reps - 3 sets        Overall Progression Towards Functional goals/ Treatment Progress Update:  [] Patient is progressing as expected towards functional goals listed. [] Progression is slowed due to complexities/Impairments listed. [] Progression has been slowed due to co-morbidities.   [x] Plan just implemented, too soon to assess goals progression <30days   [] Goals require adjustment due to lack of progress  [] Patient is not progressing as expected and requires additional follow up with physician  [] Other    Prognosis for POC: [x] Good [] Fair  [] Poor      Patient requires continued skilled intervention: [x] Yes  [] No    Treatment/Activity Tolerance:  [x] Patient able to complete treatment  [] Patient limited by fatigue  [] Patient limited by pain    [] Patient limited by other medical complications  [] Other:     Return to Play: (if applicable)   []  Stage 1: Intro to Strength   []  Stage 2: Return to Run and Strength   []  Stage 3: Return to Jump and Strength   []  Stage 4: Dynamic Strength and Agility   []  Stage 5: Sport Specific Training     []  Ready to Return to Play, Meets All Above Stages   []  Not Ready for Return to Sports   Comments:                          PLAN: See eval  [] Continue per plan of care [] Alter current plan (see comments above)  [x] Plan of care initiated [] Hold pending MD visit [] Discharge    Electronically signed by:  Wili Lebron PT    Note: If patient does not return for scheduled/ recommended follow up visits, this note will serve as a discharge from care along with most recent update on progress.

## 2021-04-08 ENCOUNTER — HOSPITAL ENCOUNTER (OUTPATIENT)
Dept: PHYSICAL THERAPY | Age: 61
Setting detail: THERAPIES SERIES
Discharge: HOME | End: 2021-04-08
Payer: COMMERCIAL

## 2021-04-08 PROCEDURE — 97112 NEUROMUSCULAR REEDUCATION: CPT | Performed by: PHYSICAL THERAPY ASSISTANT

## 2021-04-08 PROCEDURE — 97140 MANUAL THERAPY 1/> REGIONS: CPT | Performed by: PHYSICAL THERAPY ASSISTANT

## 2021-04-08 PROCEDURE — 97110 THERAPEUTIC EXERCISES: CPT | Performed by: PHYSICAL THERAPY ASSISTANT

## 2021-04-08 NOTE — ANESTHESIA PRE PROCEDURE
Department of Anesthesiology  Preprocedure Note       Name:  Valeria Costa   Age:  64 y.o.  :  1960                                          MRN:  [id number]         Date:  2020      Surgeon:  Racquel Haskins MD    Procedure:   RIGHT KNEE VIDEO ARTHROSCOPY, LIMITED SYNOVECTOMY FOR HOFFA'S PAD, CHONDROPLASTY OF PATELLA    HPI:  64 y.o. male who presented with right knee pain is had for the last 7 months or so. He says it began in 2019 and now grades his pain 5/10. The pain is constant and associated with stiffness instability weakness. Twisting and sudden movements or side to side movement seems to aggravate it and rest seems to help. He has had steroids and other medication, he is done exercises and tried some over-the-counter bracing. He denies any problems prior to this issue. He says it is affecting the way he works and walks and stands. MRI of Lower Extremity: 1. Partially healed subchondral cyst at the patellar apex with overlying chondral irregularity is grade 3-4. Fissure or flap extends to the cortex. 2. Prominent myxoid change posterior horn body meniscus. No macrotear. 3. Medial posterior swelling. Sprain and capsulitis present. 4. Inflammation within Hoffa fat typical of prior trauma and/or repetitive microtrauma. 5. Inflammation within the suprapatellar retroquadriceps fat pad typical of prior trauma or chronic impingement.      Medications prior to admission:   buPROPion (WELLBUTRIN XL) 150 MG  TAKE 1 TABLET EVERY MORNING   famotidine (PEPCID) 40 MG tablet Take 1 tablet by mouth every evening   ARMOUR THYROID 180 MG tablet    Vitamins B1 B6 B12 (NEURO CATHERINE PO) Take by mouth   triamcinolone (KENALOG) 0.1 % ointment Apply to the affected area on skin 2 times daily    sildenafil (VIAGRA) 50 MG tablet Take 1 tablet by mouth as needed for Erectile Dysfunction   lisinopril (PRINIVIL;ZESTRIL) 10 MG tablet TAKE 1 TABLET DAILY   Testosterone (ANDROGEL PUMP)  GEL gel APPLY 2 PUMP ACTUATIONS TOPICALLY ONCE DAILY IN THE MORNING TO THE SHOULDERS AND UPPER ARMS   Cholecalciferol (VITAMIN D3) 3000 UNITS  Take  by mouth.   magnesium 30 MG tablet Take 250 mg by mouth 2 times daily.      Allergies:     Atorvastatin      Myalgias     Protonix [Pantoprazole Sodium] Hives     Problem List:     Low testosterone    Capsulitis of foot    GERD (gastroesophageal reflux disease)    Erectile dysfunction    Elevated LDL cholesterol level    Essential hypertension    Prediabetes    Hyperlipidemia    Former smoker    Acute idiopathic gout of multiple sites    Acquired hypothyroidism    Acute pain of right knee    Degenerative tear of medial meniscus of right knee    Locked knee, right    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HCC)    Chondromalacia of right patella     Past Medical History:     Degenerative tear of medial meniscus of right knee 2020    Erectile dysfunction     GERD (gastroesophageal reflux disease)     Hypertension     Hypertriglyceridemia 1/10/2018    Hypothyroidism     Low testosterone     Recurrent kidney stones     Tobacco abuse 1/10/2018     Past Surgical History:     APPENDECTOMY      FINGER SURGERY      HERNIA REPAIR      bilateral    LITHOTRIPSY      VASECTOMY       Social History:     Smoking status: Former Smoker     Packs/day: 0.50     Years: 15.00     Pack years: 7.50     Types: Cigarettes     Last attempt to quit: 2018     Years since quittin.5    Smokeless tobacco: Never Used   Substance Use Topics    Alcohol use: Yes     Comment: weekly (1 drink of bourbon in a sitting)      Vital Signs (Current):    BP: 140/101  Pulse: 82    Resp: 18  SpO2: 99    Temp: 97.2 °F (36.2 °C)      BP Readings from Last 3 Encounters:   20 134/88   20 116/79   20 125/88     NPO Status: >8 hrs                         BMI:   Wt Readings from Last 3 Encounters:   20 179 lb (81.2 kg)   20 186 lb 1.1 oz (84.4 kg)   20 186 lb (84.4 kg)       CBC: WBC 4.1 08/21/2020    HGB 15.9 08/21/2020    HCT 46.4 08/21/2020     08/21/2020     CMP:     08/21/2020    K 4.4 08/21/2020     08/21/2020    CO2 23 08/21/2020    BUN 13 08/21/2020    CREATININE 0.8 08/21/2020    GLUCOSE 87 08/21/2020    PROT 7.7 01/30/2020    CALCIUM 9.7 08/21/2020    BILITOT 0.7 01/30/2020    ALKPHOS 174 01/30/2020    AST 24 01/30/2020    ALT 33 01/30/2020     COVID-19 Screening (If Applicable): COVID19 NOT DETECTED 08/21/2020     Anesthesia Evaluation  Patient summary reviewed and Nursing notes reviewed  Airway: Mallampati: II  TM distance: >3 FB   Neck ROM: full  Mouth opening: > = 3 FB Dental:          Pulmonary: breath sounds clear to auscultation      (-) COPD, asthma, recent URI, sleep apnea, wheezes and not a current smoker                           Cardiovascular:  Exercise tolerance: good (>4 METS),   (+) hypertension:, hyperlipidemia    (-) past MI,  angina,  JACOBO and murmur    ECG reviewed  Rhythm: regular  Rate: normal                 ROS comment: EKG: Sinus  Rhythm 90; nl axis; no acute ischemic changes     Neuro/Psych:      (-) seizures, TIA and CVA           GI/Hepatic/Renal:   (+) GERD:, renal disease: kidney stones,      (-) hepatitis and liver disease       Endo/Other:    (+) hypothyroidism: arthritis:., .    (-) diabetes mellitus               Abdominal:           Vascular:     - DVT and PE. Anesthesia Plan      general     ASA 3       Induction: intravenous. MIPS: Prophylactic antiemetics administered. Anesthetic plan and risks discussed with patient. Plan discussed with CRNA.             Anushka Esquivel MD DISPLAY PLAN FREE TEXT

## 2021-04-08 NOTE — FLOWSHEET NOTE
[address] [place] [address] and [facility], [address] [place] [address] [address]  Phone: [phone]   Fax:     [phone]      Physical Therapy Treatment Note/ Progress Report:     Date:  2021    Patient Name:  Mony Daugherty    :  1960  MRN: [id number]  Restrictions/Precautions:    Medical/Treatment Diagnosis Information:  Diagnosis: M16.11 primary OA R hip, M25.551 R hip pain  THR 2021  Treatment Diagnosis: M25.551 R hip pain  Insurance/Certification information:   [facility]  Physician Information:  Referring Practitioner: Dr. Anibal Sepulveda  Has the plan of care been signed (Y/N):        []  Yes  [x]  No     Date of Patient follow up with Physician: 4/15/2021    Is this a Progress Report:     []  Yes  [x]  No      If Yes:  Date Range for reporting period:  Beginning:  ------------ Ending:     Progress report will be due (10 Rx or 30 days whichever is less): 93       Recertification will be due (POC Duration  / 90 days whichever is less): 21        Visit # Insurance Allowable Auth Required   In Person 2 100 []  Yes     [x]  No    Tele Health   []  Yes     []  No    Total 2       Functional Scale: LEFS: 78%    Date assessed:  21      Latex Allergy:  [x]NO      []YES  Preferred Language for Healthcare:   [x]English       []other:    Pain level:  2-3/10     SUBJECTIVE: Sore today. Working on [employer]. Sleeping is going well.        OBJECTIVE: See eval   Observation:    Test measurements:      RESTRICTIONS/PRECAUTIONS: anterior hip precautions    Exercises/Interventions:   Therapeutic Ex (55428) Sets/sec Reps Notes/CUES HEP   Ankle pumps 1 30  x   Supine hip ADD iso 5\" 15  x   Supine ABD iso 5\" 10  x   glute set 5\" 20  x   Quad set  5\" 15     SAQ  5\" x20     Supine march (within allowed motion)  X20 R, L      LAQ   X20 with add squeeze             Supine hip ABD 1 10  x   Standing hip ABD 2 10  x   Standing HR  3 10  x   Slant Board  30\" 3     Stnading hip flexion with in precautions 2 10     Standing HS Curl  2 10                   Pt edu: HEP, dx, POC, ice       Manual Intervention (89353)       Hip flexor stretch  5'      Patellar mobs  5'      ITB STM  5'                           NMR re-education (91726)   CUES NEEDED                                                                   Therapeutic Activity (47450)                                          [id number] access code: [email or web address]           Therapeutic Exercise and NMR EXR  [x] (36295) Provided verbal/tactile cueing for activities related to strengthening, flexibility, endurance, ROM for improvements in LE, proximal hip, and core control with self care, mobility, lifting, ambulation. [x] (28470) Provided verbal/tactile cueing for activities related to improving balance, coordination, kinesthetic sense, posture, motor skill, proprioception to assist with LE, proximal hip, and core control in self-care, mobility, lifting, ambulation and eccentric single leg control.      NMR and Therapeutic Activities:    [x] (30951 or 90454) Provided verbal/tactile cueing for activities related to improving balance, coordination, kinesthetic sense, posture, motor skill, proprioception and motor activation to allow for proper function of core, proximal hip and LE with self-care and ADLs and functional mobility.   [] (07658) Gait Re-education- Provided training and instruction to the patient for proper LE, core and proximal hip recruitment and positioning and eccentric body weight control with ambulation re-education including up and down stairs     Home Exercise Program:    [x] (22589) Reviewed/Progressed HEP activities related to strengthening, flexibility, endurance, ROM of core, proximal hip and LE for functional self-care, mobility, lifting and ambulation/stair navigation   [] (42434) Reviewed/Progressed HEP activities related to improving balance, coordination, kinesthetic sense, posture, motor skill, proprioception of demonstrate increased AROM to WNL to allow for proper joint functioning as indicated by patients Functional Deficits. [x]? Progressing: []? Met: []? Not Met: []? Adjusted      3. Patient will demonstrate an increase in Strength to good proximal hip strength and control, to 5/5 in LE to allow for proper functional mobility as indicated by patients Functional Deficits. [x]? Progressing: []? Met: []? Not Met: []? Adjusted      4. Patient will return to all functional activities without increased symptoms or restriction. [x]? Progressing: []? Met: []? Not Met: []? Adjusted      5. Pt will amb with no gait deviation. (patient specific functional goal)    [x]? Progressing: []? Met: []? Not Met: []? Adjusted          Access Code: [email or web address]  URL: [email or web address]  Date: 04/05/2021  Prepared by: Naina Duncan    Exercises  Supine Gluteal Sets - 2 x daily - 7 x weekly - 1 sets - 10 reps - 10 sec hold  Supine Hip Abduction - 2 x daily - 7 x weekly - 2 sets - 10 reps  Hooklying Isometric Clamshell - 1 x daily - 7 x weekly - 1 sets - 15 reps - 5 sec hold  Supine Hip Adduction Isometric with Ball - 1 x daily - 7 x weekly - 1 sets - 10 reps - 10 sec hold  Standing Hip Abduction with Counter Support - 1 x daily - 7 x weekly - 10 reps - 3 sets  Standing Heel Raise with Support - 1 x daily - 7 x weekly - 10 reps - 3 sets        Overall Progression Towards Functional goals/ Treatment Progress Update:  [x] Patient is progressing as expected towards functional goals listed. [] Progression is slowed due to complexities/Impairments listed. [] Progression has been slowed due to co-morbidities.   [] Plan just implemented, too soon to assess goals progression <30days   [] Goals require adjustment due to lack of progress  [] Patient is not progressing as expected and requires additional follow up with physician  [] Other    Prognosis for POC: [x] Good [] Fair  [] Poor      Patient requires continued skilled intervention: [x] Yes  [] No    Treatment/Activity Tolerance:  [x] Patient able to complete treatment  [] Patient limited by fatigue  [] Patient limited by pain    [] Patient limited by other medical complications  [] Other:     Return to Play: (if applicable)   []  Stage 1: Intro to Strength   []  Stage 2: Return to Run and Strength   []  Stage 3: Return to Jump and Strength   []  Stage 4: Dynamic Strength and Agility   []  Stage 5: Sport Specific Training     []  Ready to Return to Play, Meets All Above Stages   []  Not Ready for Return to Sports   Comments:                          PLAN: See eval  [x] Continue per plan of care [] Alter current plan (see comments above)  [] Plan of care initiated [] Hold pending MD visit [] Discharge    Electronically signed by:  Chriss Padilla PTA    Note: If patient does not return for scheduled/ recommended follow up visits, this note will serve as a discharge from care along with most recent update on progress.

## 2021-04-12 ENCOUNTER — HOSPITAL ENCOUNTER (OUTPATIENT)
Dept: PHYSICAL THERAPY | Age: 61
Setting detail: THERAPIES SERIES
Discharge: HOME | End: 2021-04-12
Payer: COMMERCIAL

## 2021-04-12 PROCEDURE — 97140 MANUAL THERAPY 1/> REGIONS: CPT | Performed by: PHYSICAL THERAPY ASSISTANT

## 2021-04-12 PROCEDURE — 97110 THERAPEUTIC EXERCISES: CPT | Performed by: PHYSICAL THERAPY ASSISTANT

## 2021-04-12 PROCEDURE — 97112 NEUROMUSCULAR REEDUCATION: CPT | Performed by: PHYSICAL THERAPY ASSISTANT

## 2021-04-12 NOTE — FLOWSHEET NOTE
723 [facility] and [address], [address] [place] [address] [address]  Phone: [phone]   Fax:     [phone]      Physical Therapy Treatment Note/ Progress Report:     Date:  2021    Patient Name:  Haley Honeycutt    :  1960  MRN: [id number]  Restrictions/Precautions:    Medical/Treatment Diagnosis Information:  Diagnosis: M16.11 primary OA R hip, M25.551 R hip pain  THR 2021  Treatment Diagnosis: M25.551 R hip pain  Insurance/Certification information:   [facility]  Physician Information:  Referring Practitioner: Dr. Chahal Marking  Has the plan of care been signed (Y/N):        []  Yes  [x]  No     Date of Patient follow up with Physician: 4/15/2021    Is this a Progress Report:     []  Yes  [x]  No      If Yes:  Date Range for reporting period:  Beginning:  ------------ Ending:     Progress report will be due (10 Rx or 30 days whichever is less): 49       Recertification will be due (POC Duration  / 90 days whichever is less): 21        Visit # Insurance Allowable Auth Required   In Person 3 100 []  Yes     [x]  No    Tele Health   []  Yes     []  No    Total 3       Functional Scale: LEFS: 78%    Date assessed:  21      Latex Allergy:  [x]NO      []YES  Preferred Language for Healthcare:   [x]English       []other:    Pain level:  2-3/10     SUBJECTIVE:  \"Doing very well\"    OBJECTIVE: See eval   Observation:    Test measurements:      RESTRICTIONS/PRECAUTIONS: anterior hip precautions    Exercises/Interventions:   Therapeutic Ex (00153) Sets/sec Reps Notes/CUES HEP   Ankle pumps 1 30  x   Supine hip ADD iso 5\" 20  x   Supine ABD iso 5\" 20  x   glute set 5\" 20  x   Quad set  5\" 15     SAQ  5\" x20     Supine march (within allowed motion)  x20 R, L      LAQ   x30 with add squeeze             Supine hip ABD 1 10  x   Standing hip ABD 2 10  x   Standing HR  3 10  x   Slant Board  30\" 3     Standing hip flexion with in precautions 2 10 proper joint functioning as indicated by patients Functional Deficits. [x]? Progressing: []? Met: []? Not Met: []? Adjusted      3. Patient will demonstrate an increase in Strength to good proximal hip strength and control, to 5/5 in LE to allow for proper functional mobility as indicated by patients Functional Deficits. [x]? Progressing: []? Met: []? Not Met: []? Adjusted      4. Patient will return to all functional activities without increased symptoms or restriction. [x]? Progressing: []? Met: []? Not Met: []? Adjusted      5. Pt will amb with no gait deviation. (patient specific functional goal)    [x]? Progressing: []? Met: []? Not Met: []? Adjusted          Access Code: [email or web address]  URL: [email or web address]  Date: 04/05/2021  Prepared by: Dean Media    Exercises  Supine Gluteal Sets - 2 x daily - 7 x weekly - 1 sets - 10 reps - 10 sec hold  Supine Hip Abduction - 2 x daily - 7 x weekly - 2 sets - 10 reps  Hooklying Isometric Clamshell - 1 x daily - 7 x weekly - 1 sets - 15 reps - 5 sec hold  Supine Hip Adduction Isometric with Ball - 1 x daily - 7 x weekly - 1 sets - 10 reps - 10 sec hold  Standing Hip Abduction with Counter Support - 1 x daily - 7 x weekly - 10 reps - 3 sets  Standing Heel Raise with Support - 1 x daily - 7 x weekly - 10 reps - 3 sets        Overall Progression Towards Functional goals/ Treatment Progress Update:  [x] Patient is progressing as expected towards functional goals listed. [] Progression is slowed due to complexities/Impairments listed. [] Progression has been slowed due to co-morbidities.   [] Plan just implemented, too soon to assess goals progression <30days   [] Goals require adjustment due to lack of progress  [] Patient is not progressing as expected and requires additional follow up with physician  [] Other    Prognosis for POC: [x] Good [] Fair  [] Poor      Patient requires continued skilled intervention: [x] Yes  [] No    Treatment/Activity Tolerance:  [x] Patient able to complete treatment  [] Patient limited by fatigue  [] Patient limited by pain    [] Patient limited by other medical complications  [] Other:     Return to Play: (if applicable)   []  Stage 1: Intro to Strength   []  Stage 2: Return to Run and Strength   []  Stage 3: Return to Jump and Strength   []  Stage 4: Dynamic Strength and Agility   []  Stage 5: Sport Specific Training     []  Ready to Return to Play, Meets All Above Stages   []  Not Ready for Return to Sports   Comments:                          PLAN: See eval  [x] Continue per plan of care [] Alter current plan (see comments above)  [] Plan of care initiated [] Hold pending MD visit [] Discharge    Electronically signed by:  Britta Grant PTA    Note: If patient does not return for scheduled/ recommended follow up visits, this note will serve as a discharge from care along with most recent update on progress.

## 2021-04-15 ENCOUNTER — OFFICE VISIT (OUTPATIENT)
Dept: ORTHOPEDIC SURGERY | Age: 61
End: 2021-04-15

## 2021-04-15 ENCOUNTER — HOSPITAL ENCOUNTER (OUTPATIENT)
Dept: PHYSICAL THERAPY | Age: 61
Setting detail: THERAPIES SERIES
Discharge: HOME | End: 2021-04-15
Payer: COMMERCIAL

## 2021-04-15 VITALS — HEIGHT: 72 IN | BODY MASS INDEX: 24.38 KG/M2 | RESPIRATION RATE: 12 BRPM | WEIGHT: 180 LBS

## 2021-04-15 DIAGNOSIS — Z96.641 S/P HIP REPLACEMENT, RIGHT: Primary | ICD-10-CM

## 2021-04-15 PROCEDURE — 97112 NEUROMUSCULAR REEDUCATION: CPT | Performed by: PHYSICAL THERAPY ASSISTANT

## 2021-04-15 PROCEDURE — 97110 THERAPEUTIC EXERCISES: CPT | Performed by: PHYSICAL THERAPY ASSISTANT

## 2021-04-15 PROCEDURE — 97140 MANUAL THERAPY 1/> REGIONS: CPT | Performed by: PHYSICAL THERAPY ASSISTANT

## 2021-04-15 PROCEDURE — 99024 POSTOP FOLLOW-UP VISIT: CPT | Performed by: ORTHOPAEDIC SURGERY

## 2021-04-15 NOTE — PROGRESS NOTES
History of Present Illness:  Saba Cazares is a pleasant 64 y.o. male who presents for a post operative visit. He is 2 weeks out following a right total hip arthroplasty for AVN. Overall He is doing terrific and feels that their pain is well controlled with current pain medications. He has been compliant with anterior precuations He is in PT at [facility] ([facility] He denies fevers, chills, numbness, tingling, and shortness of breath. Medical History:  Patient's medications, allergies, past medical, surgical, social and family histories were reviewed and updated as appropriate. No notes on file    Review of Systems  A 14 point review of systems was completed by the patient and is available in the media section of the scanned medical record and was reviewed on 4/15/2021. Vital Signs:  Vitals:    04/15/21 1426   Resp: 12       General/Appearance: Alert and oriented and in no apparent distress. Skin:  There are no skin lesions, cellulitis, or extreme edema. The patient has warm and well-perfused Bilateral lower  extremities with brisk capillary refill. Hip  Exam:    Inspection: Hip incision(s)  are clean, dry and intact and well approximated. The Prineo dressing is still in place. Mild ecchymosis and swelling are present as can be expected. There is no erythema, drainage or other signs of infection    Palpation:  No crepitus to gentle motion / circumduction of the hip    Active Range of Motion: 0 to 90deg flexion    Passive Range of Motion: same    Strength:  Deferred    Special Tests:  Deferred. Neurovascular: Sensation to light touch is intact, no motor deficits, palpable radial pulses 2+    Radiology:       No new XR obtained at this time. Assessment :  Mr. Saba Cazares is a pleasant 64 y.o. patient who is 2 weeks post right LINK. .        Impression:  Encounter Diagnosis   Name Primary?     S/P hip replacement, right Yes       Office Procedures:  No orders of the defined types were placed in this encounter. Treatment Plan:    Overall Trini Jordan is doing quite well. The pain is well-controlled. We recommend that He commence with physical therapy for timeline based progression of motion, weight bearing, and and strengthening. The patient was told that he is restricted from driving for at least 4weeks postop. They were advised to continue their ASA. All of his questions were fully answered today. We would like to see Trini Jordan back in 4 weeks for follow-up visit and x-rays. Sincerely,    John Portillo MD [facility] and [address] [place] [address]   [address] [facility], [address]  Email: [email or web address]  Office: [phone]    04/15/21  2:40 PM

## 2021-04-15 NOTE — FLOWSHEET NOTE
[address] [facility] [place], [address] [facility] [address]  Phone: [phone]   Fax:     [phone]    Date: 4/15/2021          Patient Name; :  Lilia Blue; 1960   Dx/ICD Code: Diagnosis: M16.11 primary OA R hip, M25.551 R hip pain  THR 2021  Treatment Diagnosis: M25.551 R hip pain     Physician: Dr. Katelin Morris        Total PT Visits: 4     Measures Previous Current   Pain (0-10) 2/10 2/10    Disability % LEFS: 78% LEFS:  34 = 58%   ROM               Strength                 Specific Functional Improvements & Impressions:  Elvi Buck is progressing well in PT with ROM and strength per protocol. His function is slowly improving and he is beginning to do some light activities around home such as cooking. He is doing well ambulating up and down his stairs at home. He is avoiding sleeping on his right side. Elvi Buck is no longer taking pain meds. He is compliant with his precautions and doing well with his HEP. Plan & Recommendations:  [x] Continue rehabilitation due to objective improvement and continued functional deficits with frequency and duration:2 times a week for 6-8 weeks   [] Progress toward  []GAP, []Work Conditioning, []Independent HEP   [] Discharge due to   [] All goals achieved, [] Maximized \"medical necessity\" [] No subjective or objective improvements      Electronically signed by:  Marnie Bhatt, PTA ; Kamla Velasco PT, MPT ,ATC    Therapy Plan of Care Re-Certification  This patient has been re-evaluated for physical therapy services and for therapy to continue, Medicare, Medicaid and other insurances require periodic physician review of the treatment plan.  Please review the above re-evaluation and verify that you agree with plan of care as established above by signing the attached document and return it to our office or note changes to established plan below  [] Follow treatment plan as above [] Discontinue physical therapy  [] Change plan to:                                 __________________________________________________    Physician Signature:____________________________________ Date:____________  By signing above, therapists plan is approved by physician    If you have any questions or concerns, please don't hesitate to call.   Thank you for your referral.    Jeovanny Weathers 23 office  ([phone])     Fax [phone]     Physical Therapy Treatment Note/ Progress Report:     Date:  4/15/2021    Patient Name:  Armando Ortiz    :  1960  MRN: [id number]  Restrictions/Precautions:    Medical/Treatment Diagnosis Information:  Diagnosis: M16.11 primary OA R hip, M25.551 R hip pain  THR 2021  Treatment Diagnosis: M25.551 R hip pain  Insurance/Certification information:   [facility]  Physician Information:  Referring Practitioner: Dr. Park Moore  Has the plan of care been signed (Y/N):        []  Yes  [x]  No     Date of Patient follow up with Physician: 4/15/2021    Is this a Progress Report:     []  Yes  [x]  No      If Yes:  Date Range for reporting period:  Beginning:  ------------ Ending:   Beginnin/15/2021----Endin/15/2021    Progress report will be due (10 Rx or 30 days whichever is less):        Recertification will be due (POC Duration  / 90 days whichever is less): 21        Visit # Insurance Allowable Auth Required   In Person 4 100 []  Yes     [x]  No    Tele Health   []  Yes     []  No    Total 4       Functional Scale: LEFS: 78%    Date assessed:  21   LEFS:  34 = 58%      Date:  4/15/2021     Latex Allergy:  [x]NO      []YES  Preferred Language for Healthcare:   [x]English       []other:    Pain level:  2-3/10     SUBJECTIVE: (2 weeks post-op 2021) See MD note   OBJECTIVE: See eval   Observation:    Test measurements:      RESTRICTIONS/PRECAUTIONS: anterior hip precautions    Exercises/Interventions:   Therapeutic Ex (75595) Sets/sec Reps Notes/CUES HEP   Ankle pumps 1 30  x Supine hip ADD iso 5\" 20  x   Supine ABD iso 5\" 20  x   glute set 5\" 20  x   Quad set  5\" 15     SAQ  5\" x20 2#     Supine march (within allowed motion)  x20 R, L      LAQ   x30 with add squeeze  2#            Supine hip ABD 1 10  x   Standing hip ABD 2 10  x   Standing HR  3 10  x   Slant Board  30\" 3     Standing hip flexion with in precautions 2 15     Standing HS Curl  2 15            FSU  2 15 4\"     Leg Press  60#  X20     TKE with band  5\"  X20  BLUE            Pt edu: HEP, dx, POC, ice       Manual Intervention (66975)       Hip flexor stretch  5'      Patellar mobs  5'      ITB STM  5'                           NMR re-education (75815)   CUES NEEDED                                                                   Therapeutic Activity (91922)                                          Seat 14A access code: [email or web address]           Therapeutic Exercise and NMR EXR  [x] (44243) Provided verbal/tactile cueing for activities related to strengthening, flexibility, endurance, ROM for improvements in LE, proximal hip, and core control with self care, mobility, lifting, ambulation. [x] (00907) Provided verbal/tactile cueing for activities related to improving balance, coordination, kinesthetic sense, posture, motor skill, proprioception to assist with LE, proximal hip, and core control in self-care, mobility, lifting, ambulation and eccentric single leg control.      NMR and Therapeutic Activities:    [x] (26705 or 32566) Provided verbal/tactile cueing for activities related to improving balance, coordination, kinesthetic sense, posture, motor skill, proprioception and motor activation to allow for proper function of core, proximal hip and LE with self-care and ADLs and functional mobility.   [] (20214) Gait Re-education- Provided training and instruction to the patient for proper LE, core and proximal hip recruitment and positioning and eccentric body weight control with ambulation re-education including up and down stairs     Home Exercise Program:    [x] (07558) Reviewed/Progressed HEP activities related to strengthening, flexibility, endurance, ROM of core, proximal hip and LE for functional self-care, mobility, lifting and ambulation/stair navigation   [] (33895) Reviewed/Progressed HEP activities related to improving balance, coordination, kinesthetic sense, posture, motor skill, proprioception of core, proximal hip and LE for self-care, mobility, lifting, and ambulation/stair navigation      Manual Treatments:  PROM / STM / Oscillations-Mobs:  G-I, II, III, IV (PA's, Inf., Post.)  [] (86128) Provided manual therapy to mobilize LE, proximal hip and/or LS spine soft tissue/joints for the purpose of modulating pain, promoting relaxation, increasing ROM, reducing/eliminating soft tissue swelling/inflammation/restriction, improving soft tissue extensibility and allowing for proper ROM for normal function with self-care, mobility, lifting and ambulation. Modalities:  Declined ice   [] GAME READY (VASO)- for significant edema, swelling, pain control. Charges:  Timed Code Treatment Minutes: 45   Total Treatment Minutes:  45   BWC:  TE TIME:  NMR TIME:  MANUAL TIME:  UNTIMED MINUTES:  Medicare Total:                 [] EVAL (LOW) 97211 (typically 20 minutes face-to-face)  [] EVAL (MOD) 02717 (typically 30 minutes face-to-face)  [] EVAL (HIGH) 64266 (typically 45 minutes face-to-face)  [] RE-EVAL     [x] KX(19009) x   1  [] IONTO  [x] NMR (05068) x 1    [] VASO  [x] Manual (14449) x  1   [] Other:  [] TA x      [] Mech Traction (24598)  [] ES(attended) (05869)      [] ES (un) (77779):    ASSESSMENT:  See eval    GOALS:     Patient stated goal: increased movement     Therapist goals for Patient:   Short Term Goals: To be achieved in: 2 weeks  1. Independent in HEP and progression per patient tolerance, in order to prevent re-injury. []? Progressing: [x]? Met: []? Not Met: []? Adjusted      2.  Patient will have a decrease in pain to facilitate improvement in movement, function, and ADLs as indicated by Functional Deficits. []? Progressing: [x]? Met: []? Not Met: []? Adjusted      Long Term Goals: To be achieved in: 12 weeks  1. Disability index score of 35% or less for the LEFS to assist with reaching prior level of function. [x]? Progressing: []? Met: []? Not Met: []? Adjusted      2. Patient will demonstrate increased AROM to WNL to allow for proper joint functioning as indicated by patients Functional Deficits. [x]? Progressing: []? Met: []? Not Met: []? Adjusted      3. Patient will demonstrate an increase in Strength to good proximal hip strength and control, to 5/5 in LE to allow for proper functional mobility as indicated by patients Functional Deficits. [x]? Progressing: []? Met: []? Not Met: []? Adjusted      4. Patient will return to all functional activities without increased symptoms or restriction. [x]? Progressing: []? Met: []? Not Met: []? Adjusted      5. Pt will amb with no gait deviation. (patient specific functional goal)    [x]? Progressing: []? Met: []? Not Met: []? Adjusted          Access Code: [email or web address]  URL: [email or web address]  Date: 04/05/2021  Prepared by: Mirian Vu    Exercises  Supine Gluteal Sets - 2 x daily - 7 x weekly - 1 sets - 10 reps - 10 sec hold  Supine Hip Abduction - 2 x daily - 7 x weekly - 2 sets - 10 reps  Hooklying Isometric Clamshell - 1 x daily - 7 x weekly - 1 sets - 15 reps - 5 sec hold  Supine Hip Adduction Isometric with Ball - 1 x daily - 7 x weekly - 1 sets - 10 reps - 10 sec hold  Standing Hip Abduction with Counter Support - 1 x daily - 7 x weekly - 10 reps - 3 sets  Standing Heel Raise with Support - 1 x daily - 7 x weekly - 10 reps - 3 sets        Overall Progression Towards Functional goals/ Treatment Progress Update:  [x] Patient is progressing as expected towards functional goals listed.     [] Progression is slowed due to complexities/Impairments listed. [] Progression has been slowed due to co-morbidities. [] Plan just implemented, too soon to assess goals progression <30days   [] Goals require adjustment due to lack of progress  [] Patient is not progressing as expected and requires additional follow up with physician  [] Other    Prognosis for POC: [x] Good [] Fair  [] Poor      Patient requires continued skilled intervention: [x] Yes  [] No    Treatment/Activity Tolerance:  [x] Patient able to complete treatment  [] Patient limited by fatigue  [] Patient limited by pain    [] Patient limited by other medical complications  [] Other:     Return to Play: (if applicable)   []  Stage 1: Intro to Strength   []  Stage 2: Return to Run and Strength   []  Stage 3: Return to Jump and Strength   []  Stage 4: Dynamic Strength and Agility   []  Stage 5: Sport Specific Training     []  Ready to Return to Play, Meets All Above Stages   []  Not Ready for Return to Sports   Comments:                          PLAN: See eval  [x] Continue per plan of care [] Alter current plan (see comments above)  [] Plan of care initiated [] Hold pending MD visit [] Discharge    Electronically signed by:  Ruperto York, PTA ; Dipti New PT,MPT,ATC  Note: If patient does not return for scheduled/ recommended follow up visits, this note will serve as a discharge from care along with most recent update on progress.

## 2021-04-15 NOTE — LETTER
[address] [place] [address]  [facility] [address]  Phone: [phone]  Fax: [phone]    Lilly Hearn MD        April 15, 2021     Patient: Guillermina Stanley   YOB: 1960   Date of Visit: 4/15/2021       To Whom It May Concern: It is my medical opinion that Guillermina Stanley requires a disability parking placard for the following reasons:  He cannot walk 200 feet without stopping to rest.  Duration of need: 3 months    If you have any questions or concerns, please don't hesitate to call.     Sincerely,            Lilly Hearn MD

## 2021-04-19 ENCOUNTER — HOSPITAL ENCOUNTER (OUTPATIENT)
Dept: PHYSICAL THERAPY | Age: 61
Setting detail: THERAPIES SERIES
Discharge: HOME | End: 2021-04-19
Payer: COMMERCIAL

## 2021-04-19 PROCEDURE — 97140 MANUAL THERAPY 1/> REGIONS: CPT | Performed by: PHYSICAL THERAPY ASSISTANT

## 2021-04-19 PROCEDURE — 97110 THERAPEUTIC EXERCISES: CPT | Performed by: PHYSICAL THERAPY ASSISTANT

## 2021-04-19 PROCEDURE — 97112 NEUROMUSCULAR REEDUCATION: CPT | Performed by: PHYSICAL THERAPY ASSISTANT

## 2021-04-19 NOTE — FLOWSHEET NOTE
723 [facility] and 500 [facility], [facility]5 [facility] 3560 [facility], [address] [place] [address] [address]  Phone: [phone]   Fax:     [phone]      Physical Therapy Treatment Note/ Progress Report:     Date:  2021    Patient Name:  Mell Hodge    :  1960  MRN: [id number]  Restrictions/Precautions:    Medical/Treatment Diagnosis Information:  Diagnosis: M16.11 primary OA R hip, M25.551 R hip pain  THR 2021  Treatment Diagnosis: M25.551 R hip pain  Insurance/Certification information:   [facility]  Physician Information:  Referring Practitioner: Dr. Nilton Torres  Has the plan of care been signed (Y/N):        []  Yes  [x]  No     Date of Patient follow up with Physician: 4/15/2021    Is this a Progress Report:     []  Yes  [x]  No      If Yes:  Date Range for reporting period:  Beginning:  ------------ Ending:   Beginnin/15/2021----Endin/15/2021    Progress report will be due (10 Rx or 30 days whichever is less): 5/3/54       Recertification will be due (POC Duration  / 90 days whichever is less): 21        Visit # Insurance Allowable Auth Required   In Person 5 100 []  Yes     [x]  No    Tele Health   []  Yes     []  No    Total 5       Functional Scale: LEFS: 78%    Date assessed:  21   LEFS:  34 = 58%      Date:  4/15/2021     Latex Allergy:  [x]NO      []YES  Preferred Language for Healthcare:   [x]English       []other:    Pain level:  2-3/10     SUBJECTIVE: (2 weeks post-op 2021) Was a little sore on Saturday but states that he did go to [employer]. and do more walking than usual as they did not have a cart and also hung up some laundry so he associates this increased soreness/ stiffness with that. He did see Dr. Maxime Lopez who was happy with his progress.       OBJECTIVE: See eval   Observation:    Test measurements:      RESTRICTIONS/PRECAUTIONS: anterior hip precautions    Exercises/Interventions:   Therapeutic Ex (22957) Sets/sec Reps Notes/CUES HEP Ankle pumps 1 30  x   Supine hip ADD iso 5\" 20  x   Supine ABD iso 5\" 20  x   glute set 5\" 20  x   Quad set  5\" 15     SAQ  5\" x20 2#     Supine march (within allowed motion)  x20 R, L      LAQ   x30 with add squeeze  2#            Supine hip ABD 1 10  x   Standing hip ABD 2 10  x   Standing HR  3 10  x   Slant Board  30\" 3     Standing hip flexion with in precautions 2 15     Standing HS Curl  2 15            FSU  2 15 4\"     Leg Press  80# / 60#  X30 / Collette Krystyna with band  5\"  X20  BLUE            Pt edu: HEP, dx, POC, ice       Manual Intervention (67656)       Hip flexor stretch  5'      Patellar mobs  5'      ITB STM  5'                           NMR re-education (56273)   CUES NEEDED                                                                   Therapeutic Activity (69636)                                          [employer] access code: [email or web address]           Therapeutic Exercise and NMR EXR  [x] (54350) Provided verbal/tactile cueing for activities related to strengthening, flexibility, endurance, ROM for improvements in LE, proximal hip, and core control with self care, mobility, lifting, ambulation. [x] (67991) Provided verbal/tactile cueing for activities related to improving balance, coordination, kinesthetic sense, posture, motor skill, proprioception to assist with LE, proximal hip, and core control in self-care, mobility, lifting, ambulation and eccentric single leg control.      NMR and Therapeutic Activities:    [x] (97892 or 46540) Provided verbal/tactile cueing for activities related to improving balance, coordination, kinesthetic sense, posture, motor skill, proprioception and motor activation to allow for proper function of core, proximal hip and LE with self-care and ADLs and functional mobility.   [] (49652) Gait Re-education- Provided training and instruction to the patient for proper LE, core and proximal hip recruitment and positioning and eccentric body weight control with ambulation re-education including up and down stairs     Home Exercise Program:    [x] (59620) Reviewed/Progressed HEP activities related to strengthening, flexibility, endurance, ROM of core, proximal hip and LE for functional self-care, mobility, lifting and ambulation/stair navigation   [] (94242) Reviewed/Progressed HEP activities related to improving balance, coordination, kinesthetic sense, posture, motor skill, proprioception of core, proximal hip and LE for self-care, mobility, lifting, and ambulation/stair navigation      Manual Treatments:  PROM / STM / Oscillations-Mobs:  G-I, II, III, IV (PA's, Inf., Post.)  [] (18934) Provided manual therapy to mobilize LE, proximal hip and/or LS spine soft tissue/joints for the purpose of modulating pain, promoting relaxation, increasing ROM, reducing/eliminating soft tissue swelling/inflammation/restriction, improving soft tissue extensibility and allowing for proper ROM for normal function with self-care, mobility, lifting and ambulation. Modalities:  Declined ice   [] GAME READY (VASO)- for significant edema, swelling, pain control. Charges:  Timed Code Treatment Minutes: 45   Total Treatment Minutes:  45   BWC:  TE TIME:  NMR TIME:  MANUAL TIME:  UNTIMED MINUTES:  Medicare Total:                 [] EVAL (LOW) 55008 (typically 20 minutes face-to-face)  [] EVAL (MOD) 97076 (typically 30 minutes face-to-face)  [] EVAL (HIGH) 51569 (typically 45 minutes face-to-face)  [] RE-EVAL     [x] CB(06258) x   1  [] IONTO  [x] NMR (21260) x 1    [] VASO  [x] Manual (56937) x  1   [] Other:  [] TA x      [] Mech Traction (79365)  [] ES(attended) (92475)      [] ES (un) (41714):    ASSESSMENT:  See eval    GOALS:     Patient stated goal: increased movement     Therapist goals for Patient:   Short Term Goals: To be achieved in: 2 weeks  1. Independent in HEP and progression per patient tolerance, in order to prevent re-injury. []? Progressing: [x]? Met: []? Not Met: []?  Adjusted    2. Patient will have a decrease in pain to facilitate improvement in movement, function, and ADLs as indicated by Functional Deficits. []? Progressing: [x]? Met: []? Not Met: []? Adjusted      Long Term Goals: To be achieved in: 12 weeks  1. Disability index score of 35% or less for the LEFS to assist with reaching prior level of function. [x]? Progressing: []? Met: []? Not Met: []? Adjusted      2. Patient will demonstrate increased AROM to WNL to allow for proper joint functioning as indicated by patients Functional Deficits. [x]? Progressing: []? Met: []? Not Met: []? Adjusted      3. Patient will demonstrate an increase in Strength to good proximal hip strength and control, to 5/5 in LE to allow for proper functional mobility as indicated by patients Functional Deficits. [x]? Progressing: []? Met: []? Not Met: []? Adjusted      4. Patient will return to all functional activities without increased symptoms or restriction. [x]? Progressing: []? Met: []? Not Met: []? Adjusted      5. Pt will amb with no gait deviation. (patient specific functional goal)    [x]? Progressing: []? Met: []? Not Met: []? Adjusted          Access Code: [email or web address]  URL: [email or web address]  Date: 04/05/2021  Prepared by: Nyla Manuel    Exercises  Supine Gluteal Sets - 2 x daily - 7 x weekly - 1 sets - 10 reps - 10 sec hold  Supine Hip Abduction - 2 x daily - 7 x weekly - 2 sets - 10 reps  Hooklying Isometric Clamshell - 1 x daily - 7 x weekly - 1 sets - 15 reps - 5 sec hold  Supine Hip Adduction Isometric with Ball - 1 x daily - 7 x weekly - 1 sets - 10 reps - 10 sec hold  Standing Hip Abduction with Counter Support - 1 x daily - 7 x weekly - 10 reps - 3 sets  Standing Heel Raise with Support - 1 x daily - 7 x weekly - 10 reps - 3 sets        Overall Progression Towards Functional goals/ Treatment Progress Update:  [x] Patient is progressing as expected towards functional goals listed.     [] Progression is slowed

## 2021-04-22 ENCOUNTER — HOSPITAL ENCOUNTER (OUTPATIENT)
Dept: PHYSICAL THERAPY | Age: 61
Setting detail: THERAPIES SERIES
Discharge: HOME | End: 2021-04-22
Payer: COMMERCIAL

## 2021-04-22 PROCEDURE — 97140 MANUAL THERAPY 1/> REGIONS: CPT | Performed by: PHYSICAL THERAPY ASSISTANT

## 2021-04-22 PROCEDURE — 97110 THERAPEUTIC EXERCISES: CPT | Performed by: PHYSICAL THERAPY ASSISTANT

## 2021-04-22 PROCEDURE — 97530 THERAPEUTIC ACTIVITIES: CPT | Performed by: PHYSICAL THERAPY ASSISTANT

## 2021-04-22 PROCEDURE — 97112 NEUROMUSCULAR REEDUCATION: CPT | Performed by: PHYSICAL THERAPY ASSISTANT

## 2021-04-22 NOTE — FLOWSHEET NOTE
723 [facility] and 500 [facility], [address] [place] [address], [address] [place] [address] [address]  Phone: [phone]   Fax:     [phone]      Physical Therapy Treatment Note/ Progress Report:     Date:  2021    Patient Name:  Ray Carrillo    :  1960  MRN: [id number]  Restrictions/Precautions:    Medical/Treatment Diagnosis Information:  Diagnosis: M16.11 primary OA R hip, M25.551 R hip pain  THR 2021  Treatment Diagnosis: M25.551 R hip pain  Insurance/Certification information:   [facility]  Physician Information:  Referring Practitioner: Dr. Ricardo Null  Has the plan of care been signed (Y/N):        []  Yes  [x]  No     Date of Patient follow up with Physician: 4/15/2021    Is this a Progress Report:     []  Yes  [x]  No      If Yes:  Date Range for reporting period:  Beginning:  ------------ Ending:   Beginnin/15/2021----Endin/15/2021    Progress report will be due (10 Rx or 30 days whichever is less): 61       Recertification will be due (POC Duration  / 90 days whichever is less): 21        Visit # Insurance Allowable Auth Required   In Person 6 100 []  Yes     [x]  No    Tele Health   []  Yes     []  No    Total 6       Functional Scale: LEFS: 78%    Date assessed:  21   LEFS:  34 = 58%      Date:  4/15/2021     Latex Allergy:  [x]NO      []YES  Preferred Language for Healthcare:   [x]English       []other:    Pain level:  2-3/10     SUBJECTIVE: (3 weeks post-op 2021) Presents today with one crutch - a little more achy with the weather. Added standing hip flexor stretch to neutral to HEP.     OBJECTIVE: See eval   Observation:    Test measurements:      RESTRICTIONS/PRECAUTIONS: anterior hip precautions    Exercises/Interventions:   Therapeutic Ex (85138) Sets/sec Reps Notes/CUES HEP   Ankle pumps 1 30  x   Supine hip ADD iso 5\" 20  x   Supine ABD iso 5\" 20  x   glute set 5\" 20  x   Quad set  5\" 15     SAQ  5\" x20 3#     Supine march (within allowed motion)  x20 R, L  3#     LAQ   x30 with add squeeze  3#     Supine clamshells Red  X20 / x20 / x20      Supine hip ABD 1 10  x   Standing hip ABD 2 15  x   Standing HR  3 10  x   Slant Board  30\" 3     Standing hip flexion with in precautions 2 15     Standing HS Curl  2 15            FSU  2 10 6\"     Leg Press  80# / 60#  X30 / Brad Messing TKE   5\"  X20  45#     SLB  A little discomfort     Sit to stand  With box   NV     Standing Hip flexor stretch  30\" x3 Foot on step stool - to neutral only           Pt edu: HEP, dx, POC, ice       Manual Intervention (20179)       Hip flexor stretch  5'      Patellar mobs  5'      ITB STM  5'                           NMR re-education (12763)   CUES NEEDED                                                                   Therapeutic Activity (80118)                                          [employer] access code: [email or web address]           Therapeutic Exercise and NMR EXR  [x] (51120) Provided verbal/tactile cueing for activities related to strengthening, flexibility, endurance, ROM for improvements in LE, proximal hip, and core control with self care, mobility, lifting, ambulation. [x] (75858) Provided verbal/tactile cueing for activities related to improving balance, coordination, kinesthetic sense, posture, motor skill, proprioception to assist with LE, proximal hip, and core control in self-care, mobility, lifting, ambulation and eccentric single leg control.      NMR and Therapeutic Activities:    [x] (14674 or 24835) Provided verbal/tactile cueing for activities related to improving balance, coordination, kinesthetic sense, posture, motor skill, proprioception and motor activation to allow for proper function of core, proximal hip and LE with self-care and ADLs and functional mobility.   [] (25510) Gait Re-education- Provided training and instruction to the patient for proper LE, core and proximal hip recruitment and positioning and eccentric body weight control with ambulation re-education including up and down stairs     Home Exercise Program:    [x] (48471) Reviewed/Progressed HEP activities related to strengthening, flexibility, endurance, ROM of core, proximal hip and LE for functional self-care, mobility, lifting and ambulation/stair navigation   [] (50513) Reviewed/Progressed HEP activities related to improving balance, coordination, kinesthetic sense, posture, motor skill, proprioception of core, proximal hip and LE for self-care, mobility, lifting, and ambulation/stair navigation      Manual Treatments:  PROM / STM / Oscillations-Mobs:  G-I, II, III, IV (PA's, Inf., Post.)  [] (95529) Provided manual therapy to mobilize LE, proximal hip and/or LS spine soft tissue/joints for the purpose of modulating pain, promoting relaxation, increasing ROM, reducing/eliminating soft tissue swelling/inflammation/restriction, improving soft tissue extensibility and allowing for proper ROM for normal function with self-care, mobility, lifting and ambulation. Modalities:  Declined ice   [] GAME READY (VASO)- for significant edema, swelling, pain control. Charges:  Timed Code Treatment Minutes: 55   Total Treatment Minutes:  55   BWC:  TE TIME:  NMR TIME:  MANUAL TIME:  UNTIMED MINUTES:  Medicare Total:                 [] EVAL (LOW) 50261 (typically 20 minutes face-to-face)  [] EVAL (MOD) 58580 (typically 30 minutes face-to-face)  [] EVAL (HIGH) 76373 (typically 45 minutes face-to-face)  [] RE-EVAL     [x] CA(46014) x   1  [] IONTO  [x] NMR (31007) x 1    [] VASO  [x] Manual (24859) x  1   [] Other:  [x] TA x  1    [] Mech Traction (01011)  [] ES(attended) (45509)      [] ES (un) (35245):    ASSESSMENT:  See eval    GOALS:     Patient stated goal: increased movement     Therapist goals for Patient:   Short Term Goals: To be achieved in: 2 weeks  1. Independent in HEP and progression per patient tolerance, in order to prevent re-injury. []? Progressing: [x]? Met: []?  Not Met: []? Adjusted      2. Patient will have a decrease in pain to facilitate improvement in movement, function, and ADLs as indicated by Functional Deficits. []? Progressing: [x]? Met: []? Not Met: []? Adjusted      Long Term Goals: To be achieved in: 12 weeks  1. Disability index score of 35% or less for the LEFS to assist with reaching prior level of function. [x]? Progressing: []? Met: []? Not Met: []? Adjusted      2. Patient will demonstrate increased AROM to WNL to allow for proper joint functioning as indicated by patients Functional Deficits. [x]? Progressing: []? Met: []? Not Met: []? Adjusted      3. Patient will demonstrate an increase in Strength to good proximal hip strength and control, to 5/5 in LE to allow for proper functional mobility as indicated by patients Functional Deficits. [x]? Progressing: []? Met: []? Not Met: []? Adjusted      4. Patient will return to all functional activities without increased symptoms or restriction. [x]? Progressing: []? Met: []? Not Met: []? Adjusted      5. Pt will amb with no gait deviation. (patient specific functional goal)    [x]? Progressing: []? Met: []? Not Met: []? Adjusted          Access Code: [email or web address]  URL: [email or web address]  Date: 04/05/2021  Prepared by: Yessy Boucher    Exercises  Supine Gluteal Sets - 2 x daily - 7 x weekly - 1 sets - 10 reps - 10 sec hold  Supine Hip Abduction - 2 x daily - 7 x weekly - 2 sets - 10 reps  Hooklying Isometric Clamshell - 1 x daily - 7 x weekly - 1 sets - 15 reps - 5 sec hold  Supine Hip Adduction Isometric with Ball - 1 x daily - 7 x weekly - 1 sets - 10 reps - 10 sec hold  Standing Hip Abduction with Counter Support - 1 x daily - 7 x weekly - 10 reps - 3 sets  Standing Heel Raise with Support - 1 x daily - 7 x weekly - 10 reps - 3 sets        Overall Progression Towards Functional goals/ Treatment Progress Update:  [x] Patient is progressing as expected towards functional goals listed.     [] Progression is slowed due to complexities/Impairments listed. [] Progression has been slowed due to co-morbidities. [] Plan just implemented, too soon to assess goals progression <30days   [] Goals require adjustment due to lack of progress  [] Patient is not progressing as expected and requires additional follow up with physician  [] Other    Prognosis for POC: [x] Good [] Fair  [] Poor      Patient requires continued skilled intervention: [x] Yes  [] No    Treatment/Activity Tolerance:  [x] Patient able to complete treatment  [] Patient limited by fatigue  [] Patient limited by pain    [] Patient limited by other medical complications  [] Other:     Return to Play: (if applicable)   []  Stage 1: Intro to Strength   []  Stage 2: Return to Run and Strength   []  Stage 3: Return to Jump and Strength   []  Stage 4: Dynamic Strength and Agility   []  Stage 5: Sport Specific Training     []  Ready to Return to Play, Meets All Above Stages   []  Not Ready for Return to Sports   Comments:                          PLAN: See eval  [x] Continue per plan of care [] Alter current plan (see comments above)  [] Plan of care initiated [] Hold pending MD visit [] Discharge    Electronically signed by:  Ibeth Waters PTA   Note: If patient does not return for scheduled/ recommended follow up visits, this note will serve as a discharge from care along with most recent update on progress.

## 2021-04-24 DIAGNOSIS — N52.9 ERECTILE DYSFUNCTION, UNSPECIFIED ERECTILE DYSFUNCTION TYPE: ICD-10-CM

## 2021-04-26 ENCOUNTER — HOSPITAL ENCOUNTER (OUTPATIENT)
Dept: PHYSICAL THERAPY | Age: 61
Setting detail: THERAPIES SERIES
Discharge: HOME | End: 2021-04-26
Payer: COMMERCIAL

## 2021-04-26 RX ORDER — SILDENAFIL 50 MG/1
TABLET, FILM COATED ORAL
Qty: 12 TABLET | Refills: 11 | Status: SHIPPED | OUTPATIENT
Start: 2021-04-26 | End: 2022-06-14

## 2021-04-26 NOTE — TELEPHONE ENCOUNTER
Last OV 3/24/21  Future Appointments   Date Time Provider Qing Jones   4/26/2021 10:30 AM Shruthi Gan PTA [facility]   4/29/2021  9:45 AM Shruthi Gan PTA [facility]   5/20/2021  1:45 PM Halina Gonzalez MD [facility]

## 2021-04-29 ENCOUNTER — HOSPITAL ENCOUNTER (OUTPATIENT)
Dept: PHYSICAL THERAPY | Age: 61
Setting detail: THERAPIES SERIES
Discharge: HOME | End: 2021-04-29
Payer: COMMERCIAL

## 2021-04-29 PROCEDURE — 97140 MANUAL THERAPY 1/> REGIONS: CPT | Performed by: PHYSICAL THERAPY ASSISTANT

## 2021-04-29 PROCEDURE — 97530 THERAPEUTIC ACTIVITIES: CPT | Performed by: PHYSICAL THERAPY ASSISTANT

## 2021-04-29 PROCEDURE — 97110 THERAPEUTIC EXERCISES: CPT | Performed by: PHYSICAL THERAPY ASSISTANT

## 2021-04-29 NOTE — FLOWSHEET NOTE
[address]23 [facility] and 500 [facility], [facility] [address], [address] [place] [address] [address]  Phone: [phone]   Fax:     [phone]      Physical Therapy Treatment Note/ Progress Report:     Date:  2021    Patient Name:  Eliz Baptiste    :  1960  MRN: [id number]  Restrictions/Precautions:    Medical/Treatment Diagnosis Information:  Diagnosis: M16.11 primary OA R hip, M25.551 R hip pain  THR 2021  Treatment Diagnosis: M25.551 R hip pain  Insurance/Certification information:   [facility]  Physician Information:  Referring Practitioner: Dr. Angel Curtis  Has the plan of care been signed (Y/N):        []  Yes  [x]  No     Date of Patient follow up with Physician: 4/15/2021    Is this a Progress Report:     []  Yes  [x]  No      If Yes:  Date Range for reporting period:  Beginning:  ------------ Ending:   Beginnin/15/2021----Endin/15/2021    Progress report will be due (10 Rx or 30 days whichever is less): 50       Recertification will be due (POC Duration  / 90 days whichever is less): 21        Visit # Insurance Allowable Auth Required   In Person 7 100 []  Yes     [x]  No    Tele Health   []  Yes     []  No    Total 7       Functional Scale: LEFS: 78%    Date assessed:  21   LEFS:  34 = 58%      Date:  4/15/2021     Latex Allergy:  [x]NO      []YES  Preferred Language for Healthcare:   [x]English       []other:    Pain level:  2-3/10     SUBJECTIVE: (4 weeks post-op 2021) Presents today with use of a cane. States he has been using this since yesterday and is feeling good with it.       OBJECTIVE: See eval   Observation:    Test measurements:      RESTRICTIONS/PRECAUTIONS: anterior hip precautions    Exercises/Interventions:   Therapeutic Ex (10019) Sets/sec Reps Notes/CUES HEP   Bike  5' L3   Within limits                                     x       x       x       x          SAQ  5\" x20 3#     Supine march (within allowed motion)  x20 R, L 3#     LAQ   x30 with add squeeze  3#     Supine clamshells Green  X20 / x20 / x20      Supine hip ABD x   Standing hip ABD 2 15 2#  x   Standing HR  3 10  x   Slant Board  30\" 3     Standing marches  2 15 2#     Standing HS Curl  2 15 2#           FSU  2 10 6\"     Leg Press  80# / 60#  Andrae Sprawls / Elizabethann Garland   5\"  X20  45#     SLB  10\"  x5 A little discomfort     Sit to stand  With box  4\" box  2x10     Standing Hip flexor stretch  30\" x3 Foot on step stool - to neutral only           Pt edu: HEP, dx, POC, ice       Manual Intervention (91301)       Hip flexor stretch  5'      Patellar mobs  5'      ITB STM  5'      Scar massage  5'  Discussed performing this at home. NMR re-education (12685)   CUES NEEDED                                                                   Therapeutic Activity (98547)                                          [employer] access code: [email or web address]           Therapeutic Exercise and NMR EXR  [x] (24818) Provided verbal/tactile cueing for activities related to strengthening, flexibility, endurance, ROM for improvements in LE, proximal hip, and core control with self care, mobility, lifting, ambulation. [x] (05346) Provided verbal/tactile cueing for activities related to improving balance, coordination, kinesthetic sense, posture, motor skill, proprioception to assist with LE, proximal hip, and core control in self-care, mobility, lifting, ambulation and eccentric single leg control.      NMR and Therapeutic Activities:    [x] (96044 or 68181) Provided verbal/tactile cueing for activities related to improving balance, coordination, kinesthetic sense, posture, motor skill, proprioception and motor activation to allow for proper function of core, proximal hip and LE with self-care and ADLs and functional mobility.   [] (53399) Gait Re-education- Provided training and instruction to the patient for proper LE, core and proximal hip recruitment and positioning and eccentric body weight control with ambulation re-education including up and down stairs     Home Exercise Program:    [x] (92729) Reviewed/Progressed HEP activities related to strengthening, flexibility, endurance, ROM of core, proximal hip and LE for functional self-care, mobility, lifting and ambulation/stair navigation   [] (99817) Reviewed/Progressed HEP activities related to improving balance, coordination, kinesthetic sense, posture, motor skill, proprioception of core, proximal hip and LE for self-care, mobility, lifting, and ambulation/stair navigation      Manual Treatments:  PROM / STM / Oscillations-Mobs:  G-I, II, III, IV (PA's, Inf., Post.)  [] (47250) Provided manual therapy to mobilize LE, proximal hip and/or LS spine soft tissue/joints for the purpose of modulating pain, promoting relaxation, increasing ROM, reducing/eliminating soft tissue swelling/inflammation/restriction, improving soft tissue extensibility and allowing for proper ROM for normal function with self-care, mobility, lifting and ambulation. Modalities:  Declined ice   [] GAME READY (VASO)- for significant edema, swelling, pain control. Charges:  Timed Code Treatment Minutes: 45   Total Treatment Minutes:  45   BWC:  TE TIME:  NMR TIME:  MANUAL TIME:  UNTIMED MINUTES:  Medicare Total:                 [] EVAL (LOW) 98883 (typically 20 minutes face-to-face)  [] EVAL (MOD) 51682 (typically 30 minutes face-to-face)  [] EVAL (HIGH) 25899 (typically 45 minutes face-to-face)  [] RE-EVAL     [x] QH(93287) x   1  [] IONTO  [] NMR (08164) x    [] VASO  [x] Manual (18607) x  1   [] Other:  [x] TA x  1    [] Mech Traction (10097)  [] ES(attended) (09439)      [] ES (un) (95889):    ASSESSMENT:  See eval    GOALS:     Patient stated goal: increased movement     Therapist goals for Patient:   Short Term Goals: To be achieved in: 2 weeks  1. Independent in HEP and progression per patient tolerance, in order to prevent re-injury. []? Progressing: [x]?  Met: []? Not Met: []? Adjusted      2. Patient will have a decrease in pain to facilitate improvement in movement, function, and ADLs as indicated by Functional Deficits. []? Progressing: [x]? Met: []? Not Met: []? Adjusted      Long Term Goals: To be achieved in: 12 weeks  1. Disability index score of 35% or less for the LEFS to assist with reaching prior level of function. [x]? Progressing: []? Met: []? Not Met: []? Adjusted      2. Patient will demonstrate increased AROM to WNL to allow for proper joint functioning as indicated by patients Functional Deficits. [x]? Progressing: []? Met: []? Not Met: []? Adjusted      3. Patient will demonstrate an increase in Strength to good proximal hip strength and control, to 5/5 in LE to allow for proper functional mobility as indicated by patients Functional Deficits. [x]? Progressing: []? Met: []? Not Met: []? Adjusted      4. Patient will return to all functional activities without increased symptoms or restriction. [x]? Progressing: []? Met: []? Not Met: []? Adjusted      5. Pt will amb with no gait deviation. (patient specific functional goal)    [x]? Progressing: []? Met: []? Not Met: []? Adjusted          Access Code: [email or web address]  URL: [email or web address]  Date: 04/05/2021  Prepared by: Naina Duncan    Exercises  Supine Gluteal Sets - 2 x daily - 7 x weekly - 1 sets - 10 reps - 10 sec hold  Supine Hip Abduction - 2 x daily - 7 x weekly - 2 sets - 10 reps  Hooklying Isometric Clamshell - 1 x daily - 7 x weekly - 1 sets - 15 reps - 5 sec hold  Supine Hip Adduction Isometric with Ball - 1 x daily - 7 x weekly - 1 sets - 10 reps - 10 sec hold  Standing Hip Abduction with Counter Support - 1 x daily - 7 x weekly - 10 reps - 3 sets  Standing Heel Raise with Support - 1 x daily - 7 x weekly - 10 reps - 3 sets        Overall Progression Towards Functional goals/ Treatment Progress Update:  [x] Patient is progressing as expected towards functional goals listed. [] Progression is slowed due to complexities/Impairments listed. [] Progression has been slowed due to co-morbidities. [] Plan just implemented, too soon to assess goals progression <30days   [] Goals require adjustment due to lack of progress  [] Patient is not progressing as expected and requires additional follow up with physician  [] Other    Prognosis for POC: [x] Good [] Fair  [] Poor      Patient requires continued skilled intervention: [x] Yes  [] No    Treatment/Activity Tolerance:  [x] Patient able to complete treatment  [] Patient limited by fatigue  [] Patient limited by pain    [] Patient limited by other medical complications  [] Other:     Return to Play: (if applicable)   []  Stage 1: Intro to Strength   []  Stage 2: Return to Run and Strength   []  Stage 3: Return to Jump and Strength   []  Stage 4: Dynamic Strength and Agility   []  Stage 5: Sport Specific Training     []  Ready to Return to Play, Meets All Above Stages   []  Not Ready for Return to Sports   Comments:                          PLAN: See eval  [x] Continue per plan of care [] Alter current plan (see comments above)  [] Plan of care initiated [] Hold pending MD visit [] Discharge    Electronically signed by:  Shar Mulligan PTA   Note: If patient does not return for scheduled/ recommended follow up visits, this note will serve as a discharge from care along with most recent update on progress.

## 2021-05-03 ENCOUNTER — HOSPITAL ENCOUNTER (OUTPATIENT)
Dept: PHYSICAL THERAPY | Age: 61
Setting detail: THERAPIES SERIES
Discharge: HOME | End: 2021-05-03
Payer: COMMERCIAL

## 2021-05-03 PROCEDURE — 97530 THERAPEUTIC ACTIVITIES: CPT | Performed by: PHYSICAL THERAPY ASSISTANT

## 2021-05-03 PROCEDURE — 97110 THERAPEUTIC EXERCISES: CPT | Performed by: PHYSICAL THERAPY ASSISTANT

## 2021-05-03 PROCEDURE — 97140 MANUAL THERAPY 1/> REGIONS: CPT | Performed by: PHYSICAL THERAPY ASSISTANT

## 2021-05-03 NOTE — FLOWSHEET NOTE
723 [facility] and 500 [address], [address] [place] [address] [address]  Phone: [phone]   Fax:     [phone]      Physical Therapy Treatment Note/ Progress Report:     Date:  5/3/2021    Patient Name:  Tessie Rodríguez    :  1960  MRN: [id number]  Restrictions/Precautions:    Medical/Treatment Diagnosis Information:  Diagnosis: M16.11 primary OA R hip, M25.551 R hip pain  THR 2021  Treatment Diagnosis: M25.551 R hip pain  Insurance/Certification information:   [facility]  Physician Information:  Referring Practitioner: Dr. Rosas Speaker  Has the plan of care been signed (Y/N):        []  Yes  [x]  No     Date of Patient follow up with Physician: 4/15/2021    Is this a Progress Report:     []  Yes  [x]  No      If Yes:  Date Range for reporting period:  Beginning:  ------------ Ending:   Beginnin/15/2021----Endin/15/2021    Progress report will be due (10 Rx or 30 days whichever is less): 76       Recertification will be due (POC Duration  / 90 days whichever is less): 21        Visit # Insurance Allowable Auth Required   In Person 8 100 []  Yes     [x]  No    Tele Health   []  Yes     []  No    Total 8       Functional Scale: LEFS: 78%    Date assessed:  21   LEFS:  34 = 58%      Date:  4/15/2021     Latex Allergy:  [x]NO      []YES  Preferred Language for Healthcare:   [x]English       []other:    Pain level:  2-3/10     SUBJECTIVE: (4 weeks post-op 2021) Presents with cane but using it minimally. States he is able to walk in house without cane and feels great but he does keep the cane when he is outside.     OBJECTIVE: See eval   Observation:    Test measurements:      RESTRICTIONS/PRECAUTIONS: anterior hip precautions    Exercises/Interventions:   Therapeutic Ex (10337) Sets/sec Reps Notes/CUES HEP   Bike  5' L3   Within limits                                     x       x       x       x          SAQ  5\" x20 3#     Supine march (within allowed motion)  x20 R, L  3#     LAQ   x30 with add squeeze  3#     Supine clamshells Green  X20 / x20 / x20      Supine hip ABD x   Standing hip ABD 2 15 2#  x   Standing HR  3 10  x   Slant Board  30\" 3     Standing marches  2 15 2#     Standing HS Curl  2 15 2#           FSU  2 10 6\"     Lateral Step up and over 2 10 4\"    Leg Press  80# / Nora Bowmanon / Julianne End   5\"  X20  45#     SLB  10\"  x10 A little discomfort     Step an hold  5\" X10 ea for and lateral     Sit to stand  With box  2\" box  x15     Standing Hip flexor stretch  30\" x3 Foot on step stool - to neutral only           Pt edu: HEP, dx, POC, ice       Manual Intervention (69710)       Hip flexor stretch  5'      Patellar mobs  5'      ITB STM  5'      Scar massage  5'  Discussed performing this at home. NMR re-education (61743)   CUES NEEDED                                                                   Therapeutic Activity (22165)                                          [employer] access code: [email or web address]           Therapeutic Exercise and NMR EXR  [x] (52158) Provided verbal/tactile cueing for activities related to strengthening, flexibility, endurance, ROM for improvements in LE, proximal hip, and core control with self care, mobility, lifting, ambulation. [x] (30761) Provided verbal/tactile cueing for activities related to improving balance, coordination, kinesthetic sense, posture, motor skill, proprioception to assist with LE, proximal hip, and core control in self-care, mobility, lifting, ambulation and eccentric single leg control.      NMR and Therapeutic Activities:    [x] (13863 or 42941) Provided verbal/tactile cueing for activities related to improving balance, coordination, kinesthetic sense, posture, motor skill, proprioception and motor activation to allow for proper function of core, proximal hip and LE with self-care and ADLs and functional mobility.   [] (43112) Gait Re-education- Provided weeks  1. Independent in HEP and progression per patient tolerance, in order to prevent re-injury. []? Progressing: [x]? Met: []? Not Met: []? Adjusted      2. Patient will have a decrease in pain to facilitate improvement in movement, function, and ADLs as indicated by Functional Deficits. []? Progressing: [x]? Met: []? Not Met: []? Adjusted      Long Term Goals: To be achieved in: 12 weeks  1. Disability index score of 35% or less for the LEFS to assist with reaching prior level of function. [x]? Progressing: []? Met: []? Not Met: []? Adjusted      2. Patient will demonstrate increased AROM to WNL to allow for proper joint functioning as indicated by patients Functional Deficits. [x]? Progressing: []? Met: []? Not Met: []? Adjusted      3. Patient will demonstrate an increase in Strength to good proximal hip strength and control, to 5/5 in LE to allow for proper functional mobility as indicated by patients Functional Deficits. [x]? Progressing: []? Met: []? Not Met: []? Adjusted      4. Patient will return to all functional activities without increased symptoms or restriction. [x]? Progressing: []? Met: []? Not Met: []? Adjusted      5. Pt will amb with no gait deviation. (patient specific functional goal)    [x]? Progressing: []? Met: []? Not Met: []? Adjusted          Access Code: [email or web address]  URL: [email or web address]. [email or web address]  Date: 04/05/2021  Prepared by: Ivy Screen    Exercises  Supine Gluteal Sets - 2 x daily - 7 x weekly - 1 sets - 10 reps - 10 sec hold  Supine Hip Abduction - 2 x daily - 7 x weekly - 2 sets - 10 reps  Hooklying Isometric Clamshell - 1 x daily - 7 x weekly - 1 sets - 15 reps - 5 sec hold  Supine Hip Adduction Isometric with Ball - 1 x daily - 7 x weekly - 1 sets - 10 reps - 10 sec hold  Standing Hip Abduction with Counter Support - 1 x daily - 7 x weekly - 10 reps - 3 sets  Standing Heel Raise with Support - 1 x daily - 7 x weekly - 10 reps - 3 sets        Overall Progression Towards Functional goals/ Treatment Progress Update:  [x] Patient is progressing as expected towards functional goals listed. [] Progression is slowed due to complexities/Impairments listed. [] Progression has been slowed due to co-morbidities. [] Plan just implemented, too soon to assess goals progression <30days   [] Goals require adjustment due to lack of progress  [] Patient is not progressing as expected and requires additional follow up with physician  [] Other    Prognosis for POC: [x] Good [] Fair  [] Poor      Patient requires continued skilled intervention: [x] Yes  [] No    Treatment/Activity Tolerance:  [x] Patient able to complete treatment  [] Patient limited by fatigue  [] Patient limited by pain    [] Patient limited by other medical complications  [] Other:     Return to Play: (if applicable)   []  Stage 1: Intro to Strength   []  Stage 2: Return to Run and Strength   []  Stage 3: Return to Jump and Strength   []  Stage 4: Dynamic Strength and Agility   []  Stage 5: Sport Specific Training     []  Ready to Return to Play, Meets All Above Stages   []  Not Ready for Return to Sports   Comments:                          PLAN: See eval  [x] Continue per plan of care [] Alter current plan (see comments above)  [] Plan of care initiated [] Hold pending MD visit [] Discharge    Electronically signed by:  Buck Cameron PTA   Note: If patient does not return for scheduled/ recommended follow up visits, this note will serve as a discharge from care along with most recent update on progress.

## 2021-05-06 ENCOUNTER — HOSPITAL ENCOUNTER (OUTPATIENT)
Dept: PHYSICAL THERAPY | Age: 61
Setting detail: THERAPIES SERIES
Discharge: HOME | End: 2021-05-06
Payer: COMMERCIAL

## 2021-05-06 PROCEDURE — 97530 THERAPEUTIC ACTIVITIES: CPT | Performed by: PHYSICAL THERAPY ASSISTANT

## 2021-05-06 PROCEDURE — 97110 THERAPEUTIC EXERCISES: CPT | Performed by: PHYSICAL THERAPY ASSISTANT

## 2021-05-06 PROCEDURE — 97112 NEUROMUSCULAR REEDUCATION: CPT | Performed by: PHYSICAL THERAPY ASSISTANT

## 2021-05-06 PROCEDURE — 97140 MANUAL THERAPY 1/> REGIONS: CPT | Performed by: PHYSICAL THERAPY ASSISTANT

## 2021-05-06 NOTE — FLOWSHEET NOTE
[address]23 [facility] and 500 [facility], [facility]5 [facility] 35[address] [facility], [address] [place] [address] [address]  Phone: [phone]   Fax:     [phone]      Physical Therapy Treatment Note/ Progress Report:     Date:  2021    Patient Name:  Davie An    :  1960  MRN: [id number]  Restrictions/Precautions:    Medical/Treatment Diagnosis Information:  Diagnosis: M16.11 primary OA R hip, M25.551 R hip pain  THR 2021  Treatment Diagnosis: M25.551 R hip pain  Insurance/Certification information:   [facility]  Physician Information:  Referring Practitioner: Dr. Grecia Peña  Has the plan of care been signed (Y/N):        []  Yes  [x]  No     Date of Patient follow up with Physician: 4/15/2021    Is this a Progress Report:     []  Yes  [x]  No      If Yes:  Date Range for reporting period:  Beginning:  ------------ Ending:   Beginnin/15/2021----Endin/15/2021    Progress report will be due (10 Rx or 30 days whichever is less): 00       Recertification will be due (POC Duration  / 90 days whichever is less): 21        Visit # Insurance Allowable Auth Required   In Person 9 100 []  Yes     [x]  No    Tele Health   []  Yes     []  No    Total 9       Functional Scale: LEFS: 78%    Date assessed:  21   LEFS:  34 = 58%      Date:  4/15/2021     Latex Allergy:  [x]NO      []YES  Preferred Language for Healthcare:   [x]English       []other:    Pain level:  2-3/10     SUBJECTIVE: (4 weeks post-op 2021) Presents without assistive device and doing well. Does use a cane when he is in e yard.       OBJECTIVE: See eval   Observation:    Test measurements:      RESTRICTIONS/PRECAUTIONS: anterior hip precautions    Exercises/Interventions:   Therapeutic Ex (11661) Sets/sec Reps Notes/CUES HEP   Bike  5' L3   Within limits                                     x       x       x       x          SAQ  5\" x20 3#     Supine march (within allowed motion)  x20 R, L  3#     LAQ   x30 with add squeeze  3#     Supine clamshells Green  X20 / x20 / x20      Supine hip ABD x   Standing hip ABD 2 15 3#  x   Standing HR  3 10  x   Slant Board  30\" 3     Standing marches  2 15 3#     Standing HS Curl  2 15 3#           FSU  2 10 8\"     Lateral Step up and over 2 10 6\"    Step up and over forward  2 10 6\"    Leg Press  80# / 60#  Sonali Gavia / Emmalene Meuse   5\"  X20  60#     SLB  10\"  x10     Step an hold  5\" X10 ea for and lateral     Sit to stand  With box  2\" box  x15     Standing Hip flexor stretch  30\" x3 Foot on step stool - to neutral only           Pt edu: HEP, dx, POC, ice       Manual Intervention (40322)       Hip flexor stretch  5'      Patellar mobs  5'      ITB STM  5'      Scar massage  5'  Discussed performing this at home. NMR re-education (26310)   CUES NEEDED                                                                   Therapeutic Activity (32840)                                          zerobound access code: [email or web address]           Therapeutic Exercise and NMR EXR  [x] (60256) Provided verbal/tactile cueing for activities related to strengthening, flexibility, endurance, ROM for improvements in LE, proximal hip, and core control with self care, mobility, lifting, ambulation. [x] (72303) Provided verbal/tactile cueing for activities related to improving balance, coordination, kinesthetic sense, posture, motor skill, proprioception to assist with LE, proximal hip, and core control in self-care, mobility, lifting, ambulation and eccentric single leg control.      NMR and Therapeutic Activities:    [x] (82511 or 04297) Provided verbal/tactile cueing for activities related to improving balance, coordination, kinesthetic sense, posture, motor skill, proprioception and motor activation to allow for proper function of core, proximal hip and LE with self-care and ADLs and functional mobility.   [] (81474) Gait Re-education- Provided training and instruction to the patient for proper LE, core and proximal hip recruitment and positioning and eccentric body weight control with ambulation re-education including up and down stairs     Home Exercise Program:    [x] (27742) Reviewed/Progressed HEP activities related to strengthening, flexibility, endurance, ROM of core, proximal hip and LE for functional self-care, mobility, lifting and ambulation/stair navigation   [] (55920) Reviewed/Progressed HEP activities related to improving balance, coordination, kinesthetic sense, posture, motor skill, proprioception of core, proximal hip and LE for self-care, mobility, lifting, and ambulation/stair navigation      Manual Treatments:  PROM / STM / Oscillations-Mobs:  G-I, II, III, IV (PA's, Inf., Post.)  [] (70230) Provided manual therapy to mobilize LE, proximal hip and/or LS spine soft tissue/joints for the purpose of modulating pain, promoting relaxation, increasing ROM, reducing/eliminating soft tissue swelling/inflammation/restriction, improving soft tissue extensibility and allowing for proper ROM for normal function with self-care, mobility, lifting and ambulation. Modalities:  Declined ice   [] GAME READY (VASO)- for significant edema, swelling, pain control. Charges:  Timed Code Treatment Minutes: 55   Total Treatment Minutes:  55   BWC:  TE TIME:  NMR TIME:  MANUAL TIME:  UNTIMED MINUTES:  Medicare Total:                 [] EVAL (LOW) 28507 (typically 20 minutes face-to-face)  [] EVAL (MOD) 69553 (typically 30 minutes face-to-face)  [] EVAL (HIGH) 75865 (typically 45 minutes face-to-face)  [] RE-EVAL     [x] CJ(59879) x   1  [] IONTO  [x] NMR (69332) x 1   [] VASO  [x] Manual (05693) x  1   [] Other:  [x] TA x  1    [] Mech Traction (52351)  [] ES(attended) (91410)      [] ES (un) (37414):    ASSESSMENT:  See eval    GOALS:     Patient stated goal: increased movement     Therapist goals for Patient:   Short Term Goals: To be achieved in: 2 weeks  1.  Independent in HEP and progression per patient tolerance, in order to prevent re-injury. []? Progressing: [x]? Met: []? Not Met: []? Adjusted      2. Patient will have a decrease in pain to facilitate improvement in movement, function, and ADLs as indicated by Functional Deficits. []? Progressing: [x]? Met: []? Not Met: []? Adjusted      Long Term Goals: To be achieved in: 12 weeks  1. Disability index score of 35% or less for the LEFS to assist with reaching prior level of function. [x]? Progressing: []? Met: []? Not Met: []? Adjusted      2. Patient will demonstrate increased AROM to WNL to allow for proper joint functioning as indicated by patients Functional Deficits. [x]? Progressing: []? Met: []? Not Met: []? Adjusted      3. Patient will demonstrate an increase in Strength to good proximal hip strength and control, to 5/5 in LE to allow for proper functional mobility as indicated by patients Functional Deficits. [x]? Progressing: []? Met: []? Not Met: []? Adjusted      4. Patient will return to all functional activities without increased symptoms or restriction. [x]? Progressing: []? Met: []? Not Met: []? Adjusted      5. Pt will amb with no gait deviation. (patient specific functional goal)    [x]? Progressing: []? Met: []? Not Met: []? Adjusted          Access Code: [email or web address]  URL: [email or web address]  Date: 04/05/2021  Prepared by: Nada Forward    Exercises  Supine Gluteal Sets - 2 x daily - 7 x weekly - 1 sets - 10 reps - 10 sec hold  Supine Hip Abduction - 2 x daily - 7 x weekly - 2 sets - 10 reps  Hooklying Isometric Clamshell - 1 x daily - 7 x weekly - 1 sets - 15 reps - 5 sec hold  Supine Hip Adduction Isometric with Ball - 1 x daily - 7 x weekly - 1 sets - 10 reps - 10 sec hold  Standing Hip Abduction with Counter Support - 1 x daily - 7 x weekly - 10 reps - 3 sets  Standing Heel Raise with Support - 1 x daily - 7 x weekly - 10 reps - 3 sets        Overall Progression Towards Functional goals/ Treatment Progress Update:  [x] Patient is progressing as expected towards functional goals listed. [] Progression is slowed due to complexities/Impairments listed. [] Progression has been slowed due to co-morbidities. [] Plan just implemented, too soon to assess goals progression <30days   [] Goals require adjustment due to lack of progress  [] Patient is not progressing as expected and requires additional follow up with physician  [] Other    Prognosis for POC: [x] Good [] Fair  [] Poor      Patient requires continued skilled intervention: [x] Yes  [] No    Treatment/Activity Tolerance:  [x] Patient able to complete treatment  [] Patient limited by fatigue  [] Patient limited by pain    [] Patient limited by other medical complications  [] Other:     Return to Play: (if applicable)   []  Stage 1: Intro to Strength   []  Stage 2: Return to Run and Strength   []  Stage 3: Return to Jump and Strength   []  Stage 4: Dynamic Strength and Agility   []  Stage 5: Sport Specific Training     []  Ready to Return to Play, Meets All Above Stages   []  Not Ready for Return to Sports   Comments:                          PLAN: See eval  [x] Continue per plan of care [] Alter current plan (see comments above)  [] Plan of care initiated [] Hold pending MD visit [] Discharge    Electronically signed by:  Ulisses Harris PTA   Note: If patient does not return for scheduled/ recommended follow up visits, this note will serve as a discharge from care along with most recent update on progress.

## 2021-05-10 ENCOUNTER — HOSPITAL ENCOUNTER (OUTPATIENT)
Dept: PHYSICAL THERAPY | Age: 61
Setting detail: THERAPIES SERIES
Discharge: HOME | End: 2021-05-10
Payer: COMMERCIAL

## 2021-05-10 PROCEDURE — 97112 NEUROMUSCULAR REEDUCATION: CPT | Performed by: PHYSICAL THERAPY ASSISTANT

## 2021-05-10 PROCEDURE — 97530 THERAPEUTIC ACTIVITIES: CPT | Performed by: PHYSICAL THERAPY ASSISTANT

## 2021-05-10 PROCEDURE — 97110 THERAPEUTIC EXERCISES: CPT | Performed by: PHYSICAL THERAPY ASSISTANT

## 2021-05-10 PROCEDURE — 97140 MANUAL THERAPY 1/> REGIONS: CPT | Performed by: PHYSICAL THERAPY ASSISTANT

## 2021-05-10 NOTE — FLOWSHEET NOTE
[facility]23 [facility], [address] [place] [address], [address] [place] [address] [address]  Phone: [phone]   Fax:     [phone]      Physical Therapy Treatment Note/ Progress Report:     Date:  5/10/2021    Patient Name:  Tremayne Haro    :  1960  MRN: [id number]  Restrictions/Precautions:    Medical/Treatment Diagnosis Information:  Diagnosis: M16.11 primary OA R hip, M25.551 R hip pain  THR 2021  Treatment Diagnosis: M25.551 R hip pain  Insurance/Certification information:   [facility]  Physician Information:  Referring Practitioner: Dr. Salvador Watson  Has the plan of care been signed (Y/N):        []  Yes  [x]  No     Date of Patient follow up with Physician: 4/15/2021    Is this a Progress Report:     []  Yes  [x]  No      If Yes:  Date Range for reporting period:  Beginning:  ------------ Ending:   Beginnin/15/2021----Endin/15/2021    Progress report will be due (10 Rx or 30 days whichever is less): 34       Recertification will be due (POC Duration  / 90 days whichever is less): 21        Visit # Insurance Allowable Auth Required   In Person 10 100 []  Yes     [x]  No    Tele Health   []  Yes     []  No    Total 10       Functional Scale: LEFS: 78%    Date assessed:  21   LEFS:  34 = 58%      Date:  4/15/2021         Latex Allergy:  [x]NO      []YES  Preferred Language for Healthcare:   [x]English       []other:    Pain level:  2-3/10     SUBJECTIVE: (4 weeks post-op 2021) Doing well - asks about planting flowers and getting on his knees to use a metal detector - patient reminded he is only 5 weeks post op and although he is doing very well he needs to abide his restrictions.         OBJECTIVE: See eval   Observation:    Test measurements:      RESTRICTIONS/PRECAUTIONS: anterior hip precautions    Exercises/Interventions:   Therapeutic Ex (55902) Sets/sec Reps Notes/CUES HEP   Bike  5' L3   Within limits x       x       x       x          SAQ  5\" x20 3#     Supine march (within allowed motion)  x20 R, L  3#     LAQ   x30 with add squeeze  3#     Supine clamshells Green  X20 / x20 / x20      Supine hip ABD x   Standing hip ABD 2 15 3#  x   Standing HR  3 10  x   Slant Board  30\" 3     Standing marches  2 15 3#     Standing HS Curl  2 15 3#           FSU  2 10 8\"     Lateral Step up and over 2 10 6\"    Step up and over forward  2 10 6\"    Leg Press  80# / 60#  Bhavya Schilder / Verlee Rudder   5\"  X20  60#     SLB  10\"  x10 airex     Step an hold  5\" X10 ea for and lateral airex     Sit to stand  With box  no box  x15     Standing Hip flexor stretch  30\" x3 Foot on step stool - to neutral only           Pt edu: HEP, dx, POC, ice       Manual Intervention (57078)       Hip flexor stretch  5'      Patellar mobs  5'      ITB STM  5'      Scar massage  5'  Discussed performing this at home. NMR re-education (09096)   CUES NEEDED                                                                   Therapeutic Activity (98375)                                          Tiltap access code: [email or web address]           Therapeutic Exercise and NMR EXR  [x] (43844) Provided verbal/tactile cueing for activities related to strengthening, flexibility, endurance, ROM for improvements in LE, proximal hip, and core control with self care, mobility, lifting, ambulation. [x] (99844) Provided verbal/tactile cueing for activities related to improving balance, coordination, kinesthetic sense, posture, motor skill, proprioception to assist with LE, proximal hip, and core control in self-care, mobility, lifting, ambulation and eccentric single leg control.      NMR and Therapeutic Activities:    [x] (00403 or 88555) Provided verbal/tactile cueing for activities related to improving balance, coordination, kinesthetic sense, posture, motor skill, proprioception and motor activation to allow for proper function of core, proximal hip and LE with self-care and ADLs and functional mobility.   [] (05921) Gait Re-education- Provided training and instruction to the patient for proper LE, core and proximal hip recruitment and positioning and eccentric body weight control with ambulation re-education including up and down stairs     Home Exercise Program:    [x] (08051) Reviewed/Progressed HEP activities related to strengthening, flexibility, endurance, ROM of core, proximal hip and LE for functional self-care, mobility, lifting and ambulation/stair navigation   [] (09340) Reviewed/Progressed HEP activities related to improving balance, coordination, kinesthetic sense, posture, motor skill, proprioception of core, proximal hip and LE for self-care, mobility, lifting, and ambulation/stair navigation      Manual Treatments:  PROM / STM / Oscillations-Mobs:  G-I, II, III, IV (PA's, Inf., Post.)  [] (87859) Provided manual therapy to mobilize LE, proximal hip and/or LS spine soft tissue/joints for the purpose of modulating pain, promoting relaxation, increasing ROM, reducing/eliminating soft tissue swelling/inflammation/restriction, improving soft tissue extensibility and allowing for proper ROM for normal function with self-care, mobility, lifting and ambulation. Modalities:  Declined ice   [] GAME READY (VASO)- for significant edema, swelling, pain control.      Charges:  Timed Code Treatment Minutes: 55   Total Treatment Minutes:  55   BWC:  TE TIME:  NMR TIME:  MANUAL TIME:  UNTIMED MINUTES:  Medicare Total:                 [] EVAL (LOW) 83127 (typically 20 minutes face-to-face)  [] EVAL (MOD) 00959 (typically 30 minutes face-to-face)  [] EVAL (HIGH) 50341 (typically 45 minutes face-to-face)  [] RE-EVAL     [x] ET(73049) x   1  [] IONTO  [x] NMR (65630) x 1   [] VASO  [x] Manual (39080) x  1   [] Other:  [x] TA x  1    [] Mech Traction (64463)  [] ES(attended) (13065)      [] ES (un) (90696):    ASSESSMENT:  See eval    GOALS:     Patient stated goal: increased movement     Therapist goals for Patient:   Short Term Goals: To be achieved in: 2 weeks  1. Independent in HEP and progression per patient tolerance, in order to prevent re-injury. []? Progressing: [x]? Met: []? Not Met: []? Adjusted      2. Patient will have a decrease in pain to facilitate improvement in movement, function, and ADLs as indicated by Functional Deficits. []? Progressing: [x]? Met: []? Not Met: []? Adjusted      Long Term Goals: To be achieved in: 12 weeks  1. Disability index score of 35% or less for the LEFS to assist with reaching prior level of function. [x]? Progressing: []? Met: []? Not Met: []? Adjusted      2. Patient will demonstrate increased AROM to WNL to allow for proper joint functioning as indicated by patients Functional Deficits. [x]? Progressing: []? Met: []? Not Met: []? Adjusted      3. Patient will demonstrate an increase in Strength to good proximal hip strength and control, to 5/5 in LE to allow for proper functional mobility as indicated by patients Functional Deficits. [x]? Progressing: []? Met: []? Not Met: []? Adjusted      4. Patient will return to all functional activities without increased symptoms or restriction. [x]? Progressing: []? Met: []? Not Met: []? Adjusted      5. Pt will amb with no gait deviation. (patient specific functional goal)    [x]? Progressing: []? Met: []? Not Met: []? Adjusted          Access Code: [email or web address]  URL: [email or web address]  Date: 04/05/2021  Prepared by: Heaven Aguilar    Exercises  Supine Gluteal Sets - 2 x daily - 7 x weekly - 1 sets - 10 reps - 10 sec hold  Supine Hip Abduction - 2 x daily - 7 x weekly - 2 sets - 10 reps  Hooklying Isometric Clamshell - 1 x daily - 7 x weekly - 1 sets - 15 reps - 5 sec hold  Supine Hip Adduction Isometric with Ball - 1 x daily - 7 x weekly - 1 sets - 10 reps - 10 sec hold  Standing Hip Abduction with Counter Support - 1 x daily - 7 x weekly - 10 reps - 3 sets  Standing Heel Raise with Support - 1 x daily - 7 x weekly - 10 reps - 3 sets        Overall Progression Towards Functional goals/ Treatment Progress Update:  [x] Patient is progressing as expected towards functional goals listed. [] Progression is slowed due to complexities/Impairments listed. [] Progression has been slowed due to co-morbidities. [] Plan just implemented, too soon to assess goals progression <30days   [] Goals require adjustment due to lack of progress  [] Patient is not progressing as expected and requires additional follow up with physician  [] Other    Prognosis for POC: [x] Good [] Fair  [] Poor      Patient requires continued skilled intervention: [x] Yes  [] No    Treatment/Activity Tolerance:  [x] Patient able to complete treatment  [] Patient limited by fatigue  [] Patient limited by pain    [] Patient limited by other medical complications  [] Other:     Return to Play: (if applicable)   []  Stage 1: Intro to Strength   []  Stage 2: Return to Run and Strength   []  Stage 3: Return to Jump and Strength   []  Stage 4: Dynamic Strength and Agility   []  Stage 5: Sport Specific Training     []  Ready to Return to Play, Meets All Above Stages   []  Not Ready for Return to Sports   Comments:                          PLAN: See eval  [x] Continue per plan of care [] Alter current plan (see comments above)  [] Plan of care initiated [] Hold pending MD visit [] Discharge    Electronically signed by:  Chyna Werner PTA   Note: If patient does not return for scheduled/ recommended follow up visits, this note will serve as a discharge from care along with most recent update on progress.

## 2021-05-13 ENCOUNTER — HOSPITAL ENCOUNTER (OUTPATIENT)
Dept: PHYSICAL THERAPY | Age: 61
Setting detail: THERAPIES SERIES
Discharge: HOME | End: 2021-05-13
Payer: COMMERCIAL

## 2021-05-13 PROCEDURE — 97140 MANUAL THERAPY 1/> REGIONS: CPT | Performed by: PHYSICAL THERAPY ASSISTANT

## 2021-05-13 PROCEDURE — 97112 NEUROMUSCULAR REEDUCATION: CPT | Performed by: PHYSICAL THERAPY ASSISTANT

## 2021-05-13 PROCEDURE — 97110 THERAPEUTIC EXERCISES: CPT | Performed by: PHYSICAL THERAPY ASSISTANT

## 2021-05-13 PROCEDURE — 97530 THERAPEUTIC ACTIVITIES: CPT | Performed by: PHYSICAL THERAPY ASSISTANT

## 2021-05-13 NOTE — FLOWSHEET NOTE
treatment plan as above [] Discontinue physical therapy  [] Change plan to:                                 __________________________________________________    Physician Signature:____________________________________ Date:____________  By signing above, therapists plan is approved by physician    If you have any questions or concerns, please don't hesitate to call. Thank you for your referral.    [facility] office  ([phone])     Fax [phone]       Physical Therapy Treatment Note/ Progress Report:     Date:  2021    Patient Name:  Chalino Keller    :  1960  MRN: [id number]  Restrictions/Precautions:    Medical/Treatment Diagnosis Information:  Diagnosis: M16.11 primary OA R hip, M25.551 R hip pain  THR 2021  Treatment Diagnosis: M25.551 R hip pain  Insurance/Certification information:   [facility]  Physician Information:  Referring Practitioner: Dr. Dalal Distance  Has the plan of care been signed (Y/N):        []  Yes  [x]  No     Date of Patient follow up with Physician: 4/15/2021    Is this a Progress Report:     []  Yes  [x]  No      If Yes:  Date Range for reporting period:  Beginnin2021 Endin2021  Beginnin/15/2021----Endin/15/2021  Beginnin2021----Endin2021      Progress report will be due (10 Rx or 30 days whichever is less): 5290       Recertification will be due (POC Duration  / 90 days whichever is less): 2021        Visit # Insurance Allowable Auth Required   In Person 11 100 []  Yes     [x]  No    Tele Health   []  Yes     []  No    Total 11       Functional Scale: LEFS: 78%    Date assessed:  2021   LEFS:  34 = 58%      Date:  4/15/2021  LEFS:  44 = 45%      Date:  2021       Latex Allergy:  [x]NO      []YES  Preferred Language for Healthcare:   [x]English       []other:    Pain level:  0/10     SUBJECTIVE: (6 weeks post-op 2021) Was a little sore after last visit. States he has not had any ibuprofen lately.  Has not had an swelling. Endurance is improving. Still ambulates stairs one step at a time. Sleeping is getting better. In and out of his car is improving. Feels about 70% of normal - limited by higher level activities. Still uses a cane when he is outside for stability purposes. OBJECTIVE: See eval   Observation:    Test measurements:      RESTRICTIONS/PRECAUTIONS: anterior hip precautions    Exercises/Interventions:   Therapeutic Ex (87247) Sets/sec Reps Notes/CUES HEP   Bike  5' L3   Within limits                                     x       x       x       x   Bridges  5\"  X20  Small ROM     SAQ  5\" x20 3#     Supine march (within allowed motion)  x20 R, L  3#     LAQ   x30 with add squeeze  3#     Supine clamshells Green  X20 / x20 / x20      Supine hip ABD x   Standing hip ABD 2 15 3#  x   Standing HR  3 10  x   Slant Board  30\" 3     Standing marches  2 15 3#     Standing HS Curl  2 15 3#           FSU  2 10 8\"     Lateral Step up and over 2 10 6\"    Step up and over forward  2 10 6\"    Leg Press  80# / 60#  Mirtha Garbe / Senora Even   5\"  X20  60#     SLB  10\"  x10 airex     Step an hold  5\" X10 ea for and lateral airex     Sit to stand  With box  no box  x15     Standing Hip flexor stretch  30\" x3 Foot on step stool - to neutral only           Pt edu: HEP, dx, POC, ice       Manual Intervention (50234)       Hip flexor stretch  5'      Patellar mobs  5'      ITB STM  5'      Scar massage  5'  Discussed performing this at home. NMR re-education (77621)   CUES NEEDED                                                                   Therapeutic Activity (58878)                                          [employer] access code: [email or web address]           Therapeutic Exercise and NMR EXR  [x] (39560) Provided verbal/tactile cueing for activities related to strengthening, flexibility, endurance, ROM for improvements in LE, proximal hip, and core control with self care, mobility, lifting, ambulation.   [x] (46192) Provided verbal/tactile cueing for activities related to improving balance, coordination, kinesthetic sense, posture, motor skill, proprioception to assist with LE, proximal hip, and core control in self-care, mobility, lifting, ambulation and eccentric single leg control. NMR and Therapeutic Activities:    [x] (72403 or 65559) Provided verbal/tactile cueing for activities related to improving balance, coordination, kinesthetic sense, posture, motor skill, proprioception and motor activation to allow for proper function of core, proximal hip and LE with self-care and ADLs and functional mobility.   [] (09042) Gait Re-education- Provided training and instruction to the patient for proper LE, core and proximal hip recruitment and positioning and eccentric body weight control with ambulation re-education including up and down stairs     Home Exercise Program:    [x] (53612) Reviewed/Progressed HEP activities related to strengthening, flexibility, endurance, ROM of core, proximal hip and LE for functional self-care, mobility, lifting and ambulation/stair navigation   [] (17927) Reviewed/Progressed HEP activities related to improving balance, coordination, kinesthetic sense, posture, motor skill, proprioception of core, proximal hip and LE for self-care, mobility, lifting, and ambulation/stair navigation      Manual Treatments:  PROM / STM / Oscillations-Mobs:  G-I, II, III, IV (PA's, Inf., Post.)  [] (16469) Provided manual therapy to mobilize LE, proximal hip and/or LS spine soft tissue/joints for the purpose of modulating pain, promoting relaxation, increasing ROM, reducing/eliminating soft tissue swelling/inflammation/restriction, improving soft tissue extensibility and allowing for proper ROM for normal function with self-care, mobility, lifting and ambulation. Modalities:  Declined ice   [] GAME READY (VASO)- for significant edema, swelling, pain control.      Charges:  Timed Code Treatment Minutes: 55   Total Treatment Minutes:  55   BWC:  TE TIME:  NMR TIME:  MANUAL TIME:  UNTIMED MINUTES:  Medicare Total:                 [] EVAL (LOW) 97308 (typically 20 minutes face-to-face)  [] EVAL (MOD) 36883 (typically 30 minutes face-to-face)  [] EVAL (HIGH) 45255 (typically 45 minutes face-to-face)  [] RE-EVAL     [x] JZ(17959) x   1  [] IONTO  [x] NMR (43280) x 1   [] VASO  [x] Manual (64937) x  1   [] Other:  [x] TA x  1    [] Mech Traction (96535)  [] ES(attended) (82196)      [] ES (un) (95061):    ASSESSMENT:  See eval    GOALS:     Patient stated goal: increased movement     Therapist goals for Patient:   Short Term Goals: To be achieved in: 2 weeks  1. Independent in HEP and progression per patient tolerance, in order to prevent re-injury. []? Progressing: [x]? Met: []? Not Met: []? Adjusted      2. Patient will have a decrease in pain to facilitate improvement in movement, function, and ADLs as indicated by Functional Deficits. []? Progressing: [x]? Met: []? Not Met: []? Adjusted      Long Term Goals: To be achieved in: 12 weeks  1. Disability index score of 35% or less for the LEFS to assist with reaching prior level of function. [x]? Progressing: []? Met: []? Not Met: []? Adjusted      2. Patient will demonstrate increased AROM to WNL to allow for proper joint functioning as indicated by patients Functional Deficits. [x]? Progressing: []? Met: []? Not Met: []? Adjusted      3. Patient will demonstrate an increase in Strength to good proximal hip strength and control, to 5/5 in LE to allow for proper functional mobility as indicated by patients Functional Deficits. [x]? Progressing: []? Met: []? Not Met: []? Adjusted      4. Patient will return to all functional activities without increased symptoms or restriction. [x]? Progressing: []? Met: []? Not Met: []? Adjusted      5. Pt will amb with no gait deviation. (patient specific functional goal)    [x]? Progressing: []? Met: []?  Not Met: []? Adjusted          Access Code: [email or web address]  URL: [email or web address]  Date: 04/05/2021  Prepared by: Danny Gentile    Exercises  Supine Gluteal Sets - 2 x daily - 7 x weekly - 1 sets - 10 reps - 10 sec hold  Supine Hip Abduction - 2 x daily - 7 x weekly - 2 sets - 10 reps  Hooklying Isometric Clamshell - 1 x daily - 7 x weekly - 1 sets - 15 reps - 5 sec hold  Supine Hip Adduction Isometric with Ball - 1 x daily - 7 x weekly - 1 sets - 10 reps - 10 sec hold  Standing Hip Abduction with Counter Support - 1 x daily - 7 x weekly - 10 reps - 3 sets  Standing Heel Raise with Support - 1 x daily - 7 x weekly - 10 reps - 3 sets        Overall Progression Towards Functional goals/ Treatment Progress Update:  [x] Patient is progressing as expected towards functional goals listed. [] Progression is slowed due to complexities/Impairments listed. [] Progression has been slowed due to co-morbidities.   [] Plan just implemented, too soon to assess goals progression <30days   [] Goals require adjustment due to lack of progress  [] Patient is not progressing as expected and requires additional follow up with physician  [] Other    Prognosis for POC: [x] Good [] Fair  [] Poor      Patient requires continued skilled intervention: [x] Yes  [] No    Treatment/Activity Tolerance:  [x] Patient able to complete treatment  [] Patient limited by fatigue  [] Patient limited by pain    [] Patient limited by other medical complications  [] Other:     Return to Play: (if applicable)   []  Stage 1: Intro to Strength   []  Stage 2: Return to Run and Strength   []  Stage 3: Return to Jump and Strength   []  Stage 4: Dynamic Strength and Agility   []  Stage 5: Sport Specific Training     []  Ready to Return to Play, Meets All Above Stages   []  Not Ready for Return to Sports   Comments:                          PLAN: See eval  [x] Continue per plan of care [] Alter current plan (see comments above)  [] Plan of care initiated [] Hold pending MD visit [] Discharge    Electronically signed by:  Jarvis Arteaga PTA ; Guicho Jmienez PT,MPT,ATC    Note: If patient does not return for scheduled/ recommended follow up visits, this note will serve as a discharge from care along with most recent update on progress.

## 2021-05-18 ENCOUNTER — HOSPITAL ENCOUNTER (OUTPATIENT)
Dept: PHYSICAL THERAPY | Age: 61
Setting detail: THERAPIES SERIES
Discharge: HOME | End: 2021-05-18
Payer: COMMERCIAL

## 2021-05-18 PROCEDURE — 97530 THERAPEUTIC ACTIVITIES: CPT | Performed by: PHYSICAL THERAPY ASSISTANT

## 2021-05-18 PROCEDURE — 97140 MANUAL THERAPY 1/> REGIONS: CPT | Performed by: PHYSICAL THERAPY ASSISTANT

## 2021-05-18 PROCEDURE — 97112 NEUROMUSCULAR REEDUCATION: CPT | Performed by: PHYSICAL THERAPY ASSISTANT

## 2021-05-18 PROCEDURE — 97110 THERAPEUTIC EXERCISES: CPT | Performed by: PHYSICAL THERAPY ASSISTANT

## 2021-05-18 NOTE — FLOWSHEET NOTE
[address] [place] [address] and [facility], [address] [place] [address] [address]  Phone: [phone]   Fax:     [phone]       Date: 2021                                                                                                Patient Name; :  Edvin Iniguez; 1960      Dx/ICD Code: Diagnosis: M16.11 primary OA R hip, M25.551 R hip pain  THR 2021  Treatment Diagnosis: M25.551 R hip pain                   Physician:  Dr. Mercedes Magana           Total PT Visits: 12      Measures Previous Current   Pain (0-10)   0/10   Disability %   LEFS:  44 = 45%   ROM                       Strength                          Specific Functional Improvements & Impressions:  Jordyn Gomez is progressing well in PT per protocol. He does note that he has not had any ibuprofen lately. He has no real complaints of swelling and his endurance is improving. He continues to ambulate stairs one step at a time. Sleeping is getting better. He is doing better with in/out of the car. Jordyn Gomez reports he currently feels about 70% of normal - limited by higher level activities. He uses a cane when he is outside for stability purposes.       Plan & Recommendations:  [x]? Continue rehabilitation due to objective improvement and continued functional deficits with frequency and duration: 1-2 times a week for 4-6 weeks  []? Progress toward  []? GAP, []? Work Conditioning, []? Independent HEP   []? Discharge due to   []? All goals achieved, []? Maximized \"medical necessity\" []? No subjective or objective improvements       Electronically signed by:  Fredis Smith, PTA ; Patsy Camargo PT,MPT,ATC     Therapy Plan of Care Re-Certification  This patient has been re-evaluated for physical therapy services and for therapy to continue, Medicare, Medicaid and other insurances require periodic physician review of the treatment plan.  Please review the above re-evaluation and verify that you agree with plan of care as established above by signing the attached document and return it to our office or note changes to established plan below  []? Follow treatment plan as above     []? Discontinue physical therapy  []? Change plan to:                                 __________________________________________________     Physician Signature:____________________________________ Date:____________  By signing above, therapists plan is approved by physician     If you have any questions or concerns, please don't hesitate to call.   Thank you for your referral.     [facility] Therapy office  ([phone])                                      Fax [phone]       Physical Therapy Treatment Note/ Progress Report:     Date:  2021    Patient Name:  Salbador Kaur    :  1960  MRN: [id number]  Restrictions/Precautions:    Medical/Treatment Diagnosis Information:  Diagnosis: M16.11 primary OA R hip, M25.551 R hip pain  THR 2021  Treatment Diagnosis: M25.551 R hip pain  Insurance/Certification information:   [facility]  Physician Information:  Referring Practitioner: Dr. Lisandra Cochran  Has the plan of care been signed (Y/N):        []  Yes  [x]  No     Date of Patient follow up with Physician: 4/15/2021    Is this a Progress Report:     []  Yes  [x]  No      If Yes:  Date Range for reporting period:  Beginnin2021 Endin2021  Beginnin/15/2021----Endin/15/2021  Beginnin2021----Endin2021      Progress report will be due (10 Rx or 30 days whichever is less):        Recertification will be due (POC Duration  / 90 days whichever is less): 2021        Visit # Insurance Allowable Auth Required   In Person 12 100 []  Yes     [x]  No    [facility]   []  Yes     []  No    Total 12       Functional Scale: LEFS: 78%    Date assessed:  2021   LEFS:  34 = 58%      Date:  4/15/2021  LEFS:  44 = 45%      Date:  2021       Latex Allergy:  [x]NO      []YES  Preferred Language for Healthcare: [x]English       []other:    Pain level:  0/10     SUBJECTIVE: (6 weeks post-op 5/13/2021) Doing well with no assistive device. OBJECTIVE: See eval   Observation:    Test measurements:      RESTRICTIONS/PRECAUTIONS: anterior hip precautions    Exercises/Interventions:   Therapeutic Ex (42726) Sets/sec Reps Notes/CUES HEP   Bike  5' L3   Within limits                                     x       x       x       x   Bridges  5\"  X20  Small ROM     SAQ  5\" x20 3#     Supine march (within allowed motion)  x20 R, L  3#     LAQ   x30 with add squeeze  3#     Supine clamshells Green  X20 / x20 / x20       x   Standing hip ABD 2 15 3#  x   Standing HR  3 10 Edge of CLARISSA x   Slant Board  30\" 3     Standing marches  2 15 3#     Standing HS Curl  2 15 3#    Mini Squat   x20     A Frames  3 20\" red           FSU  2 15 8\"     Lateral Step up and over 2 10 6\"    Step up and over forward  2 10 6\"    Leg Press  100# / 80#  Darwin Picket / Roderick Caper   5\"  X20  60#     SLB  10\"  x10 airex     Step an hold  5\" X10 ea for and lateral airex     Sit to stand  With box  Held secondary to knee pain    Standing Hip flexor stretch  30\" x3 Foot on step stool - to neutral only           Pt edu: HEP, dx, POC, ice       Manual Intervention (43223)       Hip flexor stretch  5'      Patellar mobs  5'      ITB STM  5'      Scar massage  5'  Discussed performing this at home. NMR re-education (16254)   CUES NEEDED                                                                   Therapeutic Activity (12350)                                          [employer] access code: [email or web address]           Therapeutic Exercise and NMR EXR  [x] (09855) Provided verbal/tactile cueing for activities related to strengthening, flexibility, endurance, ROM for improvements in LE, proximal hip, and core control with self care, mobility, lifting, ambulation.   [x] (43950) Provided verbal/tactile cueing for activities related to improving balance, coordination, kinesthetic sense, posture, motor skill, proprioception to assist with LE, proximal hip, and core control in self-care, mobility, lifting, ambulation and eccentric single leg control. NMR and Therapeutic Activities:    [x] (01881 or 95654) Provided verbal/tactile cueing for activities related to improving balance, coordination, kinesthetic sense, posture, motor skill, proprioception and motor activation to allow for proper function of core, proximal hip and LE with self-care and ADLs and functional mobility.   [] (64605) Gait Re-education- Provided training and instruction to the patient for proper LE, core and proximal hip recruitment and positioning and eccentric body weight control with ambulation re-education including up and down stairs     Home Exercise Program:    [x] (97181) Reviewed/Progressed HEP activities related to strengthening, flexibility, endurance, ROM of core, proximal hip and LE for functional self-care, mobility, lifting and ambulation/stair navigation   [] (22513) Reviewed/Progressed HEP activities related to improving balance, coordination, kinesthetic sense, posture, motor skill, proprioception of core, proximal hip and LE for self-care, mobility, lifting, and ambulation/stair navigation      Manual Treatments:  PROM / STM / Oscillations-Mobs:  G-I, II, III, IV (PA's, Inf., Post.)  [] (59751) Provided manual therapy to mobilize LE, proximal hip and/or LS spine soft tissue/joints for the purpose of modulating pain, promoting relaxation, increasing ROM, reducing/eliminating soft tissue swelling/inflammation/restriction, improving soft tissue extensibility and allowing for proper ROM for normal function with self-care, mobility, lifting and ambulation. Modalities:  Declined ice   [] GAME READY (VASO)- for significant edema, swelling, pain control.      Charges:  Timed Code Treatment Minutes: 55   Total Treatment Minutes:  55   BWC:  TE TIME:  NMR TIME:  MANUAL TIME:  UNTIMED MINUTES:  Medicare Total:                 [] EVAL (LOW) 96692 (typically 20 minutes face-to-face)  [] EVAL (MOD) 74907 (typically 30 minutes face-to-face)  [] EVAL (HIGH) 98422 (typically 45 minutes face-to-face)  [] RE-EVAL     [x] MH(33659) x   1  [] IONTO  [x] NMR (26388) x 1   [] VASO  [x] Manual (23824) x  1   [] Other:  [x] TA x  1    [] Mech Traction (37886)  [] ES(attended) (91818)      [] ES (un) (28952):    ASSESSMENT:  See eval    GOALS:     Patient stated goal: increased movement     Therapist goals for Patient:   Short Term Goals: To be achieved in: 2 weeks  1. Independent in HEP and progression per patient tolerance, in order to prevent re-injury. []? Progressing: [x]? Met: []? Not Met: []? Adjusted      2. Patient will have a decrease in pain to facilitate improvement in movement, function, and ADLs as indicated by Functional Deficits. []? Progressing: [x]? Met: []? Not Met: []? Adjusted      Long Term Goals: To be achieved in: 12 weeks  1. Disability index score of 35% or less for the LEFS to assist with reaching prior level of function. [x]? Progressing: []? Met: []? Not Met: []? Adjusted      2. Patient will demonstrate increased AROM to WNL to allow for proper joint functioning as indicated by patients Functional Deficits. [x]? Progressing: []? Met: []? Not Met: []? Adjusted      3. Patient will demonstrate an increase in Strength to good proximal hip strength and control, to 5/5 in LE to allow for proper functional mobility as indicated by patients Functional Deficits. [x]? Progressing: []? Met: []? Not Met: []? Adjusted      4. Patient will return to all functional activities without increased symptoms or restriction. [x]? Progressing: []? Met: []? Not Met: []? Adjusted      5. Pt will amb with no gait deviation. (patient specific functional goal)    [x]? Progressing: []? Met: []? Not Met: []? Adjusted          Access Code: [email or web address]  URL: [email or web address]  Date: [place] PT,MPT,ATC    Note: If patient does not return for scheduled/ recommended follow up visits, this note will serve as a discharge from care along with most recent update on progress.

## 2021-05-18 NOTE — FLOWSHEET NOTE
[address] [facility], [address] [place] [address] [address]  Phone: [phone]   Fax:     [phone]       Date: 2021                                                                                                Patient Name; :  Janell Hicks; 1960      Dx/ICD Code: Diagnosis: M16.11 primary OA R hip, M25.551 R hip pain  THR 2021  Treatment Diagnosis: M25.551 R hip pain                   Physician:  Dr. Natali Riley           Total PT Visits: 12      Measures Previous Current   Pain (0-10)   0/10   Disability %   LEFS:  44 = 45%   ROM                       Strength                          Specific Functional Improvements & Impressions:  Landon Flaherty is progressing well in PT per protocol. He does note that he has not had any ibuprofen lately. He has no real complaints of swelling and his endurance is improving. He continues to ambulate stairs one step at a time. Sleeping is getting better. He is doing better with in/out of the car. Landon Flaherty reports he currently feels about 70% of normal - limited by higher level activities. He uses a cane when he is outside for stability purposes.       Plan & Recommendations:  [x]? Continue rehabilitation due to objective improvement and continued functional deficits with frequency and duration: 1-2 times a week for 4-6 weeks  []? Progress toward  []? GAP, []? Work Conditioning, []? Independent HEP   []? Discharge due to   []? All goals achieved, []? Maximized \"medical necessity\" []? No subjective or objective improvements       Electronically signed by:  Stella Davalos, PTA ; Neftali Urias PT,MPT,ATC     Therapy Plan of Care Re-Certification  This patient has been re-evaluated for physical therapy services and for therapy to continue, Medicare, Medicaid and other insurances require periodic physician review of the treatment plan.  Please review the above re-evaluation and verify that you agree with plan of care as established above by signing the attached document and return it to our office or note changes to established plan below  []? Follow treatment plan as above     []? Discontinue physical therapy  []? Change plan to:                                 __________________________________________________     Physician Signature:____________________________________ Date:____________  By signing above, therapists plan is approved by physician     If you have any questions or concerns, please don't hesitate to call.   Thank you for your referral.     [facility] Therapy office  ([phone])                                      Fax [phone]       Physical Therapy Treatment Note/ Progress Report:     Date:  2021    Patient Name:  Charlotte Asif    :  1960  MRN: [id number]  Restrictions/Precautions:    Medical/Treatment Diagnosis Information:  Diagnosis: M16.11 primary OA R hip, M25.551 R hip pain  THR 2021  Treatment Diagnosis: M25.551 R hip pain  Insurance/Certification information:   [facility]  Physician Information:  Referring Practitioner: Dr. Pao Buenrostro  Has the plan of care been signed (Y/N):        []  Yes  [x]  No     Date of Patient follow up with Physician: 4/15/2021    Is this a Progress Report:     []  Yes  [x]  No      If Yes:  Date Range for reporting period:  Beginnin2021 Endin2021  Beginnin/15/2021----Endin/15/2021  Beginnin2021----Endin2021      Progress report will be due (10 Rx or 30 days whichever is less): 315       Recertification will be due (POC Duration  / 90 days whichever is less): 2021        Visit # Insurance Allowable Auth Required   In Person 12 100 []  Yes     [x]  No    Tele Health   []  Yes     []  No    Total 12       Functional Scale: LEFS: 78%    Date assessed:  2021   LEFS:  34 = 58%      Date:  4/15/2021  LEFS:  44 = 45%      Date:  2021       Latex Allergy:  [x]NO      []YES  Preferred Language for Healthcare: [x]English       []other:    Pain level:  0/10     SUBJECTIVE: (6 weeks post-op 5/13/2021) Doing well with no assistive device. OBJECTIVE: See eval   Observation:    Test measurements:      RESTRICTIONS/PRECAUTIONS: anterior hip precautions    Exercises/Interventions:   Therapeutic Ex (64639) Sets/sec Reps Notes/CUES HEP   Bike  5' L3   Within limits                                     x       x       x       x   Bridges  5\"  X20  Small ROM     SAQ  5\" x20 3#     Supine march (within allowed motion)  x20 R, L  3#     LAQ   x30 with add squeeze  3#     Supine clamshells Green  X20 / x20 / x20       x   Standing hip ABD 2 15 3#  x   Standing HR  3 10 Edge of CLARISSA x   Slant Board  30\" 3     Standing marches  2 15 3#     Standing HS Curl  2 15 3#    Mini Squat   x20     A Frames  3 20\" red           FSU  2 15 8\"     Lateral Step up and over 2 10 6\"    Step up and over forward  2 10 6\"    Leg Press  100# / 80#  Fidencio Cargo / Darrall Ka   5\"  X20  60#     SLB  10\"  x10 airex     Step an hold  5\" X10 ea for and lateral airex     Sit to stand  With box  Held secondary to knee pain    Standing Hip flexor stretch  30\" x3 Foot on step stool - to neutral only           Pt edu: HEP, dx, POC, ice       Manual Intervention (36555)       Hip flexor stretch  5'      Patellar mobs  5'      ITB STM  5'      Scar massage  5'  Discussed performing this at home. NMR re-education (47172)   CUES NEEDED                                                                   Therapeutic Activity (25138)                                          [employer] access code: [email or web address]           Therapeutic Exercise and NMR EXR  [x] (33445) Provided verbal/tactile cueing for activities related to strengthening, flexibility, endurance, ROM for improvements in LE, proximal hip, and core control with self care, mobility, lifting, ambulation.   [x] (81319) Provided verbal/tactile cueing for activities related to improving balance, MINUTES:  Medicare Total:                 [] EVAL (LOW) 98695 (typically 20 minutes face-to-face)  [] EVAL (MOD) 50045 (typically 30 minutes face-to-face)  [] EVAL (HIGH) 64539 (typically 45 minutes face-to-face)  [] RE-EVAL     [x] XX(53882) x   1  [] IONTO  [x] NMR (43105) x 1   [] VASO  [x] Manual (69355) x  1   [] Other:  [x] TA x  1    [] Mech Traction (63238)  [] ES(attended) (54323)      [] ES (un) (96566):    ASSESSMENT:  See eval    GOALS:     Patient stated goal: increased movement     Therapist goals for Patient:   Short Term Goals: To be achieved in: 2 weeks  1. Independent in HEP and progression per patient tolerance, in order to prevent re-injury. []? Progressing: [x]? Met: []? Not Met: []? Adjusted      2. Patient will have a decrease in pain to facilitate improvement in movement, function, and ADLs as indicated by Functional Deficits. []? Progressing: [x]? Met: []? Not Met: []? Adjusted      Long Term Goals: To be achieved in: 12 weeks  1. Disability index score of 35% or less for the LEFS to assist with reaching prior level of function. [x]? Progressing: []? Met: []? Not Met: []? Adjusted      2. Patient will demonstrate increased AROM to WNL to allow for proper joint functioning as indicated by patients Functional Deficits. [x]? Progressing: []? Met: []? Not Met: []? Adjusted      3. Patient will demonstrate an increase in Strength to good proximal hip strength and control, to 5/5 in LE to allow for proper functional mobility as indicated by patients Functional Deficits. [x]? Progressing: []? Met: []? Not Met: []? Adjusted      4. Patient will return to all functional activities without increased symptoms or restriction. [x]? Progressing: []? Met: []? Not Met: []? Adjusted      5. Pt will amb with no gait deviation. (patient specific functional goal)    [x]? Progressing: []? Met: []? Not Met: []? Adjusted          Access Code: [email or web address]  URL: [email or web address]  Date: 04/05/2021  Prepared by: Carolyn Real    Exercises  Supine Gluteal Sets - 2 x daily - 7 x weekly - 1 sets - 10 reps - 10 sec hold  Supine Hip Abduction - 2 x daily - 7 x weekly - 2 sets - 10 reps  Hooklying Isometric Clamshell - 1 x daily - 7 x weekly - 1 sets - 15 reps - 5 sec hold  Supine Hip Adduction Isometric with Ball - 1 x daily - 7 x weekly - 1 sets - 10 reps - 10 sec hold  Standing Hip Abduction with Counter Support - 1 x daily - 7 x weekly - 10 reps - 3 sets  Standing Heel Raise with Support - 1 x daily - 7 x weekly - 10 reps - 3 sets        Overall Progression Towards Functional goals/ Treatment Progress Update:  [x] Patient is progressing as expected towards functional goals listed. [] Progression is slowed due to complexities/Impairments listed. [] Progression has been slowed due to co-morbidities. [] Plan just implemented, too soon to assess goals progression <30days   [] Goals require adjustment due to lack of progress  [] Patient is not progressing as expected and requires additional follow up with physician  [] Other    Prognosis for POC: [x] Good [] Fair  [] Poor      Patient requires continued skilled intervention: [x] Yes  [] No    Treatment/Activity Tolerance:  [x] Patient able to complete treatment  [] Patient limited by fatigue  [] Patient limited by pain    [] Patient limited by other medical complications  [] Other:     Return to Play: (if applicable)   []  Stage 1: Intro to Strength   []  Stage 2: Return to Run and Strength   []  Stage 3: Return to Jump and Strength   []  Stage 4: Dynamic Strength and Agility   []  Stage 5: Sport Specific Training     []  Ready to Return to Play, Meets All Above Stages   []  Not Ready for Return to Sports   Comments:                          PLAN: See eval  [x] Continue per plan of care [] Alter current plan (see comments above)  [] Plan of care initiated [] Hold pending MD visit [] Discharge    Electronically signed by:  Jarvis Arteaga PTA ;  Marian Marlow Milan PT,MPT,ATC    Note: If patient does not return for scheduled/ recommended follow up visits, this note will serve as a discharge from care along with most recent update on progress.

## 2021-05-20 ENCOUNTER — OFFICE VISIT (OUTPATIENT)
Dept: ORTHOPEDIC SURGERY | Age: 61
End: 2021-05-20

## 2021-05-20 VITALS — HEIGHT: 72 IN | BODY MASS INDEX: 23.7 KG/M2 | WEIGHT: 175 LBS | RESPIRATION RATE: 12 BRPM

## 2021-05-20 DIAGNOSIS — M25.551 RIGHT HIP PAIN: Primary | ICD-10-CM

## 2021-05-20 PROCEDURE — 99024 POSTOP FOLLOW-UP VISIT: CPT | Performed by: ORTHOPAEDIC SURGERY

## 2021-05-20 NOTE — PROGRESS NOTES
History of Present Illness:  Vipul Meeks is a pleasant 64 y.o. male who presents for a post operative visit. He is 7 weeks out following a right total hip arthroplasty for AVN. Overall He is doing terrific and has no major pain or limitations. He has been compliant with anterior precuations He is in PT [facility]. He denies fevers, chills, numbness, tingling, and shortness of breath. Medical History:  Patient's medications, allergies, past medical, surgical, social and family histories were reviewed and updated as appropriate. No notes on file    Review of Systems  A 14 point review of systems was completed by the patient and is available in the media section of the scanned medical record and was reviewed on 5/20/2021. Vital Signs:  Vitals:    05/20/21 1401   Resp: 12       General/Appearance: Alert and oriented and in no apparent distress. Skin:  There are no skin lesions, cellulitis, or extreme edema. The patient has warm and well-perfused Bilateral lower  extremities with brisk capillary refill. Hip  Exam: Right    Inspection: Hip incision(s) are fully healed. there is no erythema, drainage or other signs of infection    Palpation:  No crepitus to gentle motion / circumduction of the hip    Active Range of Motion: 0 to 90deg flexion    Passive Range of Motion: same    Strength:  Intact with hip flexion, abduction , adduction    Special Tests:  Deferred. Neurovascular: Sensation to light touch is intact, no motor deficits, palpable radial pulses 2+    Radiology:     Plain radiographs of the RIGHT hip comprising 2 views (AP Pelvis and right hip lateral) were obtained and reviewed in the office: Shows postsurgical changes from the hip replacement. Well positioned implant with no abnormal component malposition. Impression: Stable postop x-ray. Assessment :  Mr. Vipul Meeks is a pleasant 64 y.o. patient who is 6 weeks post right LINK. .  Doing great.       Impression:  Encounter Diagnosis   Name Primary?  Right hip pain Yes       Office Procedures:  Orders Placed This Encounter   Procedures    XR HIP RIGHT (2-3 VIEWS)     Standing Status:   Future     Number of Occurrences:   1     Standing Expiration Date:   5/20/2022       Treatment Plan:    Overall Mell Hodge is doing quite well. The pain is well-controlled. We recommend that He continue with physical therapy for timeline based progression of motion, weight bearing, and and strengthening. Anterior hip precautions can be lifted. All of his questions were fully answered today. We would like to see Mell Hodge back in 6 weeks for follow-up visit and x-rays. Sincerely,    Isela Kendall MD [facility] and [address] [place] [address] [address] [place], [address] [facility], [facility]  Email: [email or web address]  Office: [phone]    05/20/21  3:03 PM

## 2021-05-21 ENCOUNTER — HOSPITAL ENCOUNTER (OUTPATIENT)
Dept: PHYSICAL THERAPY | Age: 61
Setting detail: THERAPIES SERIES
Discharge: HOME | End: 2021-05-21
Payer: COMMERCIAL

## 2021-05-21 PROCEDURE — 97530 THERAPEUTIC ACTIVITIES: CPT | Performed by: PHYSICAL THERAPY ASSISTANT

## 2021-05-21 PROCEDURE — 97140 MANUAL THERAPY 1/> REGIONS: CPT | Performed by: PHYSICAL THERAPY ASSISTANT

## 2021-05-21 PROCEDURE — 97112 NEUROMUSCULAR REEDUCATION: CPT | Performed by: PHYSICAL THERAPY ASSISTANT

## 2021-05-21 PROCEDURE — 97110 THERAPEUTIC EXERCISES: CPT | Performed by: PHYSICAL THERAPY ASSISTANT

## 2021-05-21 NOTE — FLOWSHEET NOTE
[facility] and [facility], [address] [place] [address], [address] [place] [address] [address]  Phone: [phone]   Fax:     [phone]         Physical Therapy Treatment Note/ Progress Report:     Date:  2021    Patient Name:  Wm Szymanski    :  1960  MRN: [id number]  Restrictions/Precautions:    Medical/Treatment Diagnosis Information:  Diagnosis: M16.11 primary OA R hip, M25.551 R hip pain  THR 2021  Treatment Diagnosis: M25.551 R hip pain  Insurance/Certification information:   [facility]  Physician Information:  Referring Practitioner: Dr. Jeanne Stovall  Has the plan of care been signed (Y/N):        []  Yes  [x]  No     Date of Patient follow up with Physician: 4/15/2021    Is this a Progress Report:     []  Yes  [x]  No      If Yes:  Date Range for reporting period:  Beginnin2021 Endin2021  Beginnin/15/2021----Endin/15/2021  Beginnin2021----Endin2021      Progress report will be due (10 Rx or 30 days whichever is less):        Recertification will be due (POC Duration  / 90 days whichever is less): 2021        Visit # Insurance Allowable Auth Required   In Person 12 100 []  Yes     [x]  No    Tele Health   []  Yes     []  No    Total 12       Functional Scale: LEFS: 78%    Date assessed:  2021   LEFS:  34 = 58%      Date:  4/15/2021  LEFS:  44 = 45%      Date:  2021       Latex Allergy:  [x]NO      []YES  Preferred Language for Healthcare:   [x]English       []other:    Pain level:  0/10     SUBJECTIVE: (6 weeks post-op 2021) MD pleased with progress - will see him again in 4 weeks prior to return to work on . MD note states precautions can be lifted.          OBJECTIVE: See eval   Observation:    Test measurements:      RESTRICTIONS/PRECAUTIONS: anterior hip precautions    Exercises/Interventions:   Therapeutic Ex (17717) Sets/sec Reps Notes/CUES HEP   Bike  5' L3   Within limits x       x       x   SLR  2 10  x   Bridges  5\"  X20  Earna Earthly  5\" x20 3#     Supine march (within allowed motion)  x20 R, L  3#     LAQ  3#     Supine clamshells Green  X20 / x20 / x20      SL clamshells  x15    x       x   Standing HR  3 10 Edge of CLARISSA x   Slant Board  30\" 3     Standing marches  2 15 3#            Mini Squat   x20     A Frames  3 20\" red           FSU  2 15 8\"     Lateral Step up and over 2 10 6\"    Step up and over forward  2 10 6\"    Leg Press  100# / 80#  Darwin Picket / Roderick Caper   5\"  X20  60#     CLARISSA ABD   X20 R, L  45#    SLB  10\"  x10 airex            HS Curl / Knee extension  30# / 10#   Varinder Camel / x20             Step an hold  5\" X10 ea for and lateral airex     Sit to stand  With box  Held secondary to knee pain    Standing Hip flexor stretch  30\" x3 Foot on step stool - to neutral only    Down to a kneeling position on the right knee on airex   x10     Pt edu: HEP, dx, POC, ice       Manual Intervention (54265)       Hip flexor stretch  5'      Patellar mobs  5'      ITB STM  5'      Scar massage  5'  Discussed performing this at home. NMR re-education (96781)   CUES NEEDED                                                                   Therapeutic Activity (97066)                                          [employer] access code: [email or web address]           Therapeutic Exercise and NMR EXR  [x] (58885) Provided verbal/tactile cueing for activities related to strengthening, flexibility, endurance, ROM for improvements in LE, proximal hip, and core control with self care, mobility, lifting, ambulation. [x] (86771) Provided verbal/tactile cueing for activities related to improving balance, coordination, kinesthetic sense, posture, motor skill, proprioception to assist with LE, proximal hip, and core control in self-care, mobility, lifting, ambulation and eccentric single leg control.      NMR and Therapeutic Activities:    [x] (41047 or 08375) Provided NMR (76713) x 1   [] VASO  [x] Manual (50134) x  1   [] Other:  [x] TA x  1    [] Mech Traction (64941)  [] ES(attended) (56023)      [] ES (un) (28284):    ASSESSMENT:  See eval    GOALS:     Patient stated goal: increased movement     Therapist goals for Patient:   Short Term Goals: To be achieved in: 2 weeks  1. Independent in HEP and progression per patient tolerance, in order to prevent re-injury. []? Progressing: [x]? Met: []? Not Met: []? Adjusted      2. Patient will have a decrease in pain to facilitate improvement in movement, function, and ADLs as indicated by Functional Deficits. []? Progressing: [x]? Met: []? Not Met: []? Adjusted      Long Term Goals: To be achieved in: 12 weeks  1. Disability index score of 35% or less for the LEFS to assist with reaching prior level of function. [x]? Progressing: []? Met: []? Not Met: []? Adjusted      2. Patient will demonstrate increased AROM to WNL to allow for proper joint functioning as indicated by patients Functional Deficits. [x]? Progressing: []? Met: []? Not Met: []? Adjusted      3. Patient will demonstrate an increase in Strength to good proximal hip strength and control, to 5/5 in LE to allow for proper functional mobility as indicated by patients Functional Deficits. [x]? Progressing: []? Met: []? Not Met: []? Adjusted      4. Patient will return to all functional activities without increased symptoms or restriction. [x]? Progressing: []? Met: []? Not Met: []? Adjusted      5. Pt will amb with no gait deviation. (patient specific functional goal)    [x]? Progressing: []? Met: []? Not Met: []? Adjusted          Access Code: [email or web address]  URL: [email or web address]  Date: 04/05/2021  Prepared by: Cleopatra Wilkes    Exercises  Supine Gluteal Sets - 2 x daily - 7 x weekly - 1 sets - 10 reps - 10 sec hold  Supine Hip Abduction - 2 x daily - 7 x weekly - 2 sets - 10 reps  Hooklying Isometric Clamshell - 1 x daily - 7 x weekly - 1 sets - 15 reps - 5 sec hold  Supine Hip Adduction Isometric with Ball - 1 x daily - 7 x weekly - 1 sets - 10 reps - 10 sec hold  Standing Hip Abduction with Counter Support - 1 x daily - 7 x weekly - 10 reps - 3 sets  Standing Heel Raise with Support - 1 x daily - 7 x weekly - 10 reps - 3 sets        Overall Progression Towards Functional goals/ Treatment Progress Update:  [x] Patient is progressing as expected towards functional goals listed. [] Progression is slowed due to complexities/Impairments listed. [] Progression has been slowed due to co-morbidities. [] Plan just implemented, too soon to assess goals progression <30days   [] Goals require adjustment due to lack of progress  [] Patient is not progressing as expected and requires additional follow up with physician  [] Other    Prognosis for POC: [x] Good [] Fair  [] Poor      Patient requires continued skilled intervention: [x] Yes  [] No    Treatment/Activity Tolerance:  [x] Patient able to complete treatment  [] Patient limited by fatigue  [] Patient limited by pain    [] Patient limited by other medical complications  [] Other:     Return to Play: (if applicable)   []  Stage 1: Intro to Strength   []  Stage 2: Return to Run and Strength   []  Stage 3: Return to Jump and Strength   []  Stage 4: Dynamic Strength and Agility   []  Stage 5: Sport Specific Training     []  Ready to Return to Play, Meets All Above Stages   []  Not Ready for Return to Sports   Comments:                          PLAN: See eval  [x] Continue per plan of care [] Alter current plan (see comments above)  [] Plan of care initiated [] Hold pending MD visit [] Discharge    Electronically signed by:  Shar Mulligan PTA     Note: If patient does not return for scheduled/ recommended follow up visits, this note will serve as a discharge from care along with most recent update on progress.

## 2021-05-25 ENCOUNTER — HOSPITAL ENCOUNTER (OUTPATIENT)
Dept: PHYSICAL THERAPY | Age: 61
Setting detail: THERAPIES SERIES
Discharge: HOME | End: 2021-05-25
Payer: COMMERCIAL

## 2021-05-25 PROCEDURE — 97110 THERAPEUTIC EXERCISES: CPT | Performed by: PHYSICAL THERAPY ASSISTANT

## 2021-05-25 PROCEDURE — 97140 MANUAL THERAPY 1/> REGIONS: CPT | Performed by: PHYSICAL THERAPY ASSISTANT

## 2021-05-25 PROCEDURE — 97112 NEUROMUSCULAR REEDUCATION: CPT | Performed by: PHYSICAL THERAPY ASSISTANT

## 2021-05-25 PROCEDURE — 97530 THERAPEUTIC ACTIVITIES: CPT | Performed by: PHYSICAL THERAPY ASSISTANT

## 2021-05-25 NOTE — FLOWSHEET NOTE
help.      OBJECTIVE: See eval   Observation:    Test measurements:      RESTRICTIONS/PRECAUTIONS: anterior hip precautions    Exercises/Interventions:   Therapeutic Ex (80217) Sets/sec Reps Notes/CUES HEP   Bike  5' L5   Within limits                                     x       x       x   SLR  2 10  x   Bridges  5\"  Lang Grippe  5\" x20 3#     Supine march (within allowed motion)  x20 R, L  3#     LAQ  3#     Supine clamshells Green  X20 / x20 / x20      SL clamshells  x15    x       x   Standing HR  3 10 Edge of CLARISSA x   Slant Board  30\" 3     Standing marches  2 15 3#            Mini Squat   x20 airex     A Frames  3 30\" red           FSU  2 15 8\"     Lateral Step up and over 2 10 6\"    Step up and over forward  2 10 6\"    Leg Press  100# / 80#  Rita Och / Ceasar Promise   5\"  X20  60#     CLARISSA ABD   X20 R, L  45#    SLB  10\"  x10 airex            HS Curl / Knee extension  30# / 10#   Rita Och / x30             Step an hold  5\" X10 ea for and lateral airex     Sit to stand  With box  Held secondary to knee pain    Standing Hip flexor stretch  30\" x3 Foot on step stool - to neutral only    Down to a kneeling position on the right knee on airex   x10     Pt edu: HEP, dx, POC, ice       Manual Intervention (98028)       Hip flexor stretch  5'      Patellar mobs  5'      ITB STM  5'      Scar massage  5'  Discussed performing this at home. NMR re-education (48087)   CUES NEEDED                                                                   Therapeutic Activity (77844)                                          [employer] access code: [email or web address]           Therapeutic Exercise and NMR EXR  [x] (47835) Provided verbal/tactile cueing for activities related to strengthening, flexibility, endurance, ROM for improvements in LE, proximal hip, and core control with self care, mobility, lifting, ambulation.   [x] (07491) Provided verbal/tactile cueing for activities related to improving balance, coordination, kinesthetic sense, posture, motor skill, proprioception to assist with LE, proximal hip, and core control in self-care, mobility, lifting, ambulation and eccentric single leg control. NMR and Therapeutic Activities:    [x] (34053 or 85092) Provided verbal/tactile cueing for activities related to improving balance, coordination, kinesthetic sense, posture, motor skill, proprioception and motor activation to allow for proper function of core, proximal hip and LE with self-care and ADLs and functional mobility.   [] (07621) Gait Re-education- Provided training and instruction to the patient for proper LE, core and proximal hip recruitment and positioning and eccentric body weight control with ambulation re-education including up and down stairs     Home Exercise Program:    [x] (48550) Reviewed/Progressed HEP activities related to strengthening, flexibility, endurance, ROM of core, proximal hip and LE for functional self-care, mobility, lifting and ambulation/stair navigation   [] (41523) Reviewed/Progressed HEP activities related to improving balance, coordination, kinesthetic sense, posture, motor skill, proprioception of core, proximal hip and LE for self-care, mobility, lifting, and ambulation/stair navigation      Manual Treatments:  PROM / STM / Oscillations-Mobs:  G-I, II, III, IV (PA's, Inf., Post.)  [] (63901) Provided manual therapy to mobilize LE, proximal hip and/or LS spine soft tissue/joints for the purpose of modulating pain, promoting relaxation, increasing ROM, reducing/eliminating soft tissue swelling/inflammation/restriction, improving soft tissue extensibility and allowing for proper ROM for normal function with self-care, mobility, lifting and ambulation. Modalities:  Declined ice   [] GAME READY (VASO)- for significant edema, swelling, pain control.      Charges:  Timed Code Treatment Minutes: 55   Total Treatment Minutes:  55   BWC:  TE TIME:  NMR TIME:  MANUAL TIME:  UNTIMED MINUTES:  Medicare Total:                 [] EVAL (LOW) 76159 (typically 20 minutes face-to-face)  [] EVAL (MOD) 01957 (typically 30 minutes face-to-face)  [] EVAL (HIGH) 89973 (typically 45 minutes face-to-face)  [] RE-EVAL     [x] BL(47584) x   1  [] IONTO  [x] NMR (48015) x 1   [] VASO  [x] Manual (32135) x  1   [] Other:  [x] TA x  1    [] Mech Traction (82299)  [] ES(attended) (92426)      [] ES (un) (02886):    ASSESSMENT:  See eval    GOALS:     Patient stated goal: increased movement     Therapist goals for Patient:   Short Term Goals: To be achieved in: 2 weeks  1. Independent in HEP and progression per patient tolerance, in order to prevent re-injury. []? Progressing: [x]? Met: []? Not Met: []? Adjusted      2. Patient will have a decrease in pain to facilitate improvement in movement, function, and ADLs as indicated by Functional Deficits. []? Progressing: [x]? Met: []? Not Met: []? Adjusted      Long Term Goals: To be achieved in: 12 weeks  1. Disability index score of 35% or less for the LEFS to assist with reaching prior level of function. [x]? Progressing: []? Met: []? Not Met: []? Adjusted      2. Patient will demonstrate increased AROM to WNL to allow for proper joint functioning as indicated by patients Functional Deficits. [x]? Progressing: []? Met: []? Not Met: []? Adjusted      3. Patient will demonstrate an increase in Strength to good proximal hip strength and control, to 5/5 in LE to allow for proper functional mobility as indicated by patients Functional Deficits. [x]? Progressing: []? Met: []? Not Met: []? Adjusted      4. Patient will return to all functional activities without increased symptoms or restriction. [x]? Progressing: []? Met: []? Not Met: []? Adjusted      5. Pt will amb with no gait deviation. (patient specific functional goal)    [x]? Progressing: []? Met: []? Not Met: []? Adjusted          Access Code: [email or web address]  URL: [email or web address]  Date: 04/05/2021  Prepared by: Omid Danielson    Exercises  Supine Gluteal Sets - 2 x daily - 7 x weekly - 1 sets - 10 reps - 10 sec hold  Supine Hip Abduction - 2 x daily - 7 x weekly - 2 sets - 10 reps  Hooklying Isometric Clamshell - 1 x daily - 7 x weekly - 1 sets - 15 reps - 5 sec hold  Supine Hip Adduction Isometric with Ball - 1 x daily - 7 x weekly - 1 sets - 10 reps - 10 sec hold  Standing Hip Abduction with Counter Support - 1 x daily - 7 x weekly - 10 reps - 3 sets  Standing Heel Raise with Support - 1 x daily - 7 x weekly - 10 reps - 3 sets        Overall Progression Towards Functional goals/ Treatment Progress Update:  [x] Patient is progressing as expected towards functional goals listed. [] Progression is slowed due to complexities/Impairments listed. [] Progression has been slowed due to co-morbidities.   [] Plan just implemented, too soon to assess goals progression <30days   [] Goals require adjustment due to lack of progress  [] Patient is not progressing as expected and requires additional follow up with physician  [] Other    Prognosis for POC: [x] Good [] Fair  [] Poor      Patient requires continued skilled intervention: [x] Yes  [] No    Treatment/Activity Tolerance:  [x] Patient able to complete treatment  [] Patient limited by fatigue  [] Patient limited by pain    [] Patient limited by other medical complications  [] Other:     Return to Play: (if applicable)   []  Stage 1: Intro to Strength   []  Stage 2: Return to Run and Strength   []  Stage 3: Return to Jump and Strength   []  Stage 4: Dynamic Strength and Agility   []  Stage 5: Sport Specific Training     []  Ready to Return to Play, Meets All Above Stages   []  Not Ready for Return to Sports   Comments:                          PLAN: See eval  [x] Continue per plan of care [] Alter current plan (see comments above)  [] Plan of care initiated [] Hold pending MD visit [] Discharge    Electronically signed by:  Ann Marie Gutierrez PTA     Note: If patient does not return for scheduled/ recommended follow up visits, this note will serve as a discharge from care along with most recent update on progress.

## 2021-05-27 ENCOUNTER — HOSPITAL ENCOUNTER (OUTPATIENT)
Dept: PHYSICAL THERAPY | Age: 61
Setting detail: THERAPIES SERIES
Discharge: HOME | End: 2021-05-27
Payer: COMMERCIAL

## 2021-05-27 NOTE — FLOWSHEET NOTE
[facility])    Physical Therapy  Cancellation/No-show Note  Patient Name:  Eliz Baptiste  :  1960   Date:  2021    Cancelled visits to date: 2  No-shows to date: 0    For today's appointment patient:  [x]  Cancelled  []  Rescheduled appointment  []  No-show     Reason given by patient:  [x]  Patient ill - patient's blood pressure is running too low  []  Conflicting appointment  []  No transportation    []  Conflict with work  []  No reason given  []  Other:     Comments:      Phone call information:   []  Phone call made today to patient. []  Patient answered, conversation as follows:    []  Patient did not answer. []  Phone call not made today  [x]  Phone call not needed - pt contacted us to cancel and provided reason for cancellation.      Electronically signed by:  Kiley Dean PTA

## 2021-06-07 ENCOUNTER — HOSPITAL ENCOUNTER (OUTPATIENT)
Dept: PHYSICAL THERAPY | Age: 61
Setting detail: THERAPIES SERIES
Discharge: HOME | End: 2021-06-07
Payer: COMMERCIAL

## 2021-06-07 NOTE — FLOWSHEET NOTE
[facility] ([facility])    Physical Therapy  Cancellation/No-show Note  Patient Name:  Oretha Ormond  :  1960   Date:  2021    Cancelled visits to date: 2  No-shows to date: 0    For today's appointment patient:  [x]  Cancelled  []  Rescheduled appointment  []  No-show     Reason given by patient:  []  Patient ill -   []  Conflicting appointment  []  No transportation    []  Conflict with work  []  No reason given  [x]  Other:     Comments:  Just realized he had an apt    Phone call information:   [x]  Phone call made today to patient. [x]  Patient answered, conversation as follows: see above    []  Patient did not answer. []  Phone call not made today  []  Phone call not needed - pt contacted us to cancel and provided reason for cancellation.      Electronically signed by:  Cory Elmore PTA

## 2021-06-09 ENCOUNTER — HOSPITAL ENCOUNTER (OUTPATIENT)
Dept: PHYSICAL THERAPY | Age: 61
Setting detail: THERAPIES SERIES
Discharge: HOME | End: 2021-06-09
Payer: COMMERCIAL

## 2021-06-09 NOTE — FLOWSHEET NOTE
[facility], [place]    Physical Therapy  Cancellation/No-show Note  Patient Name:  Kristy Gomez  :  1960   Date:  2021    Cancelled visits to date: 2  No-shows to date: 1    For today's appointment patient:  []  Cancelled  []  Rescheduled appointment  [x]  No-show     Reason given by patient:  []  Patient ill -   []  Conflicting appointment  []  No transportation    []  Conflict with work  [x]  No reason given  []  Other:     Comments:      Phone call information:   [x]  Phone call made today to patient. []  Patient answered, conversation as follows: see above    [x]  Patient did not answer. []  Phone call not made today  []  Phone call not needed - pt contacted us to cancel and provided reason for cancellation.      Electronically signed by:  Turner Coley PTA

## 2021-06-17 ENCOUNTER — OFFICE VISIT (OUTPATIENT)
Dept: ORTHOPEDIC SURGERY | Age: 61
End: 2021-06-17

## 2021-06-17 VITALS — HEIGHT: 72 IN | WEIGHT: 175 LBS | BODY MASS INDEX: 23.7 KG/M2

## 2021-06-17 DIAGNOSIS — Z96.641 S/P HIP REPLACEMENT, RIGHT: Primary | ICD-10-CM

## 2021-06-17 PROCEDURE — 99024 POSTOP FOLLOW-UP VISIT: CPT | Performed by: ORTHOPAEDIC SURGERY

## 2021-06-17 NOTE — PROGRESS NOTES
History of Present Illness:  Vipul Meeks is a pleasant 64 y.o. male who presents for a post operative visit. He is 12  weeks out following a right total hip arthroplasty for AVN. Overall He is doing terrific and has no major pain or limitations. He completed his  PT at [facility] ([facility] At this point he is not giving much thought to his hip. He denies fevers, chills, numbness, tingling, and shortness of breath. Medical History:  Patient's medications, allergies, past medical, surgical, social and family histories were reviewed and updated as appropriate. No notes on file    Review of Systems  A 14 point review of systems was completed by the patient and is available in the media section of the scanned medical record and was reviewed on 6/17/2021. Vital Signs: There were no vitals filed for this visit. General/Appearance: Alert and oriented and in no apparent distress. Skin:  There are no skin lesions, cellulitis, or extreme edema. The patient has warm and well-perfused Bilateral lower  extremities with brisk capillary refill. Hip  Exam: Right    Inspection: Hip incision(s) are fully healed. there is no erythema, drainage or other signs of infection    Palpation:  Non tender. Active Range of Motion: 0 to 100deg flexion, IR 15 deg, ER 30deg    Passive Range of Motion: same    Strength:  Intact with hip flexion, abduction , adduction    Special Tests:  Deferred. Neurovascular: Sensation to light touch is intact, no motor deficits, palpable radial pulses 2+    Radiology:     Plain radiographs of the RIGHT hip comprising 2 views (AP Pelvis and right hip lateral) were obtained and reviewed in the office: Shows postsurgical changes from the hip replacement. Well positioned implant with no abnormal component malposition. Impression: Stable postop x-ray. Assessment :  Mr. Vipul Meeks is a pleasant 64 y.o. patient who is 12  weeks post right LINK. Izzy Brumfield He is doing great.      Impression:  Encounter Diagnosis   Name Primary?  S/P hip replacement, right Yes       Office Procedures:  Orders Placed This Encounter   Procedures    XR HIP RIGHT (2-3 VIEWS)       Treatment Plan:    Overall Vipul Meeks is doing quite well. His gait has normalized and he has progressed well. We recommend that He continue with maintenance home exercises for general hip strengthening. All of his questions were fully answered today. We would like to see Vipul Meeks back in 3 month for follow-up visit and x-rays of his right hip (AP Pelvis and right hip lateral). Sincerely,    Halina Gonzalez MD [facility] and [address] [place] [address]   [address] [facility], [address]  Email: [email or web address]  Office: [phone]    06/17/21  5:04 PM

## 2021-06-24 ENCOUNTER — TELEPHONE (OUTPATIENT)
Dept: ORTHOPEDIC SURGERY | Age: 61
End: 2021-06-24

## 2021-06-24 NOTE — TELEPHONE ENCOUNTER
6/25/2021 - faxed completed fmla and fitness for duty forms to [facility] @ [phone]    LM for the patient regarding his return to work date for the patient.   Received fmla / fitnes for duty form to update

## 2021-07-28 ENCOUNTER — OFFICE VISIT (OUTPATIENT)
Dept: FAMILY MEDICINE CLINIC | Age: 61
End: 2021-07-28
Payer: COMMERCIAL

## 2021-07-28 VITALS
BODY MASS INDEX: 23.84 KG/M2 | DIASTOLIC BLOOD PRESSURE: 70 MMHG | SYSTOLIC BLOOD PRESSURE: 120 MMHG | HEART RATE: 85 BPM | HEIGHT: 72 IN | OXYGEN SATURATION: 98 % | WEIGHT: 176 LBS

## 2021-07-28 DIAGNOSIS — F41.9 ANXIETY: Primary | ICD-10-CM

## 2021-07-28 DIAGNOSIS — Z63.0 PROBLEMS IN RELATIONSHIP WITH SPOUSE OR PARTNER: ICD-10-CM

## 2021-07-28 DIAGNOSIS — I10 ESSENTIAL HYPERTENSION: ICD-10-CM

## 2021-07-28 PROCEDURE — 99214 OFFICE O/P EST MOD 30 MIN: CPT | Performed by: NURSE PRACTITIONER

## 2021-07-28 RX ORDER — BUPROPION HYDROCHLORIDE 150 MG/1
150 TABLET ORAL EVERY MORNING
Qty: 90 TABLET | Refills: 1 | Status: SHIPPED | OUTPATIENT
Start: 2021-07-28 | End: 2022-05-06 | Stop reason: ALTCHOICE

## 2021-07-28 SDOH — SOCIAL STABILITY - SOCIAL INSECURITY: PROBLEMS IN RELATIONSHIP WITH SPOUSE OR PARTNER: Z63.0

## 2021-07-28 ASSESSMENT — ENCOUNTER SYMPTOMS
CONSTIPATION: 0
NAUSEA: 0
TROUBLE SWALLOWING: 0
STRIDOR: 0
CHOKING: 0
SHORTNESS OF BREATH: 0
BLOOD IN STOOL: 0
CHEST TIGHTNESS: 0
EYE DISCHARGE: 0
RESPIRATORY NEGATIVE: 1
ALLERGIC/IMMUNOLOGIC NEGATIVE: 1
SINUS PRESSURE: 0
COUGH: 0
EYE REDNESS: 0
ABDOMINAL PAIN: 0
VOMITING: 0
DIARRHEA: 0
WHEEZING: 0
EYE PAIN: 0
BACK PAIN: 0
COLOR CHANGE: 0
GASTROINTESTINAL NEGATIVE: 1
PHOTOPHOBIA: 0
APNEA: 0
RHINORRHEA: 0
SORE THROAT: 0
EYE ITCHING: 0

## 2021-07-28 NOTE — PATIENT INSTRUCTIONS
seizures, especially if you have certain medical conditions or use certain drugs. Tell your doctor about all of your medical conditions and the drugs you use. Tell your doctor if you have ever had:  · a head injury, seizures, or brain or spinal cord tumor;  · narrow-angle glaucoma;  · heart disease, high blood pressure, or a heart attack;  · diabetes;  · kidney or liver disease (especially cirrhosis);  · depression, bipolar disorder, or other mental illness; or  · if you drink alcohol. Some young people have thoughts about suicide when first taking an antidepressant. Your doctor will need to check your progress at regular visits. Your family or other caregivers should also be alert to changes in your mood or symptoms. Ask your doctor about taking this medicine if you are pregnant. It is not known whether bupropion will harm an unborn baby. However, you may have a relapse of depression if you stop taking your antidepressant. Tell your doctor right away if you become pregnant. Do not start or stop taking bupropion without your doctor's advice. If you are pregnant, your name may be listed on a pregnancy registry to track the effects of bupropion on the baby. It may not be safe to breastfeed while using this medicine. Ask your doctor about any risk. Bupropion is not approved for use by anyone younger than 25years old. How should I take bupropion? Follow all directions on your prescription label and read all medication guides or instruction sheets. Your doctor may occasionally change your dose. Use the medicine exactly as directed. Too much of this medicine can increase your risk of a seizure. You may take bupropion with or without food. Swallow the extended-release tablet whole and do not crush, chew, or break it. You should not change your dose or stop using bupropion suddenly, unless you have a seizure while taking this medicine. Stopping suddenly can cause unpleasant withdrawal symptoms.  Ask your doctor how to safely stop using bupropion. If you take Zyban to help you stop smoking, you may continue to smoke for about 1 week after you start the medicine. Set a date to quit smoking during the first 2 weeks of treatment. Talk to your doctor if you have trouble quitting after taking Zyban for 7 to 12 weeks. Your doctor may prescribe a nicotine replacement product (such as patches or gum) to help you stop smoking. Start using the nicotine replacement product on the same day you stop (quit) smoking or using tobacco products. Some people taking bupropion (Wellbutrin or Zyban) have had high blood pressure that is severe, especially when also using a nicotine replacement product (patch or gum). Your blood pressure may need to be checked before and during treatment with bupropion. Read and carefully follow any Instructions for Use provided with your medicine. Ask your doctor or pharmacist if you do not understand these instructions. You may have nicotine withdrawal symptoms when you stop smoking, including: increased appetite, weight gain, trouble sleeping, trouble concentrating, slower heart rate, having the urge to smoke, and feeling anxious, restless, depressed, angry, frustrated, or irritated. These symptoms may occur with or without using medication such as Zyban. Smoking cessation may also cause new or worsening mental health problems, such as depression. This medicine may affect a drug-screening urine test and you may have false results. Tell the laboratory staff that you use bupropion. Store at room temperature away from moisture, heat, and light. What happens if I miss a dose? Skip the missed dose and use your next dose at the regular time. Do not use two doses at one time. What happens if I overdose? Seek emergency medical attention or call the Poison Help line at 1-524.592.9315.  An overdose of bupropion can be fatal.  Overdose symptoms may include muscle stiffness, hallucinations, fast or uneven heartbeat, shallow breathing, or fainting. What should I avoid while taking bupropion? Drinking alcohol with bupropion may increase your risk of seizures. If you drink alcohol regularly, talk with your doctor before changing the amount you drink. Bupropion can also cause seizures in a regular drinker who suddenly stops drinking at the start of treatment with bupropion. Avoid driving or hazardous activity until you know how this medicine will affect you. Your reactions could be impaired. What are the possible side effects of bupropion? Get emergency medical help if you have signs of an allergic reaction (hives, itching, fever, swollen glands, difficult breathing, swelling in your face or throat) or a severe skin reaction (fever, sore throat, burning eyes, skin pain, red or purple skin rash with blistering and peeling). Report any new or worsening symptoms to your doctor, such as: mood or behavior changes, anxiety, depression, panic attacks, trouble sleeping, or if you feel impulsive, irritable, agitated, hostile, aggressive, restless, hyperactive (mentally or physically), more depressed, or have thoughts about suicide or hurting yourself. Call your doctor at once if you have:  · a seizure (convulsions);  · confusion, unusual changes in mood or behavior;  · blurred vision, tunnel vision, eye pain or swelling, or seeing halos around lights;  · fast or irregular heartbeats; or  · a manic episode --racing thoughts, increased energy, reckless behavior, feeling extremely happy or irritable, talking more than usual, severe problems with sleep.   Common side effects may include:  · dry mouth, sore throat, stuffy nose;  · ringing in the ears;  · blurred vision;  · nausea, vomiting, stomach pain, loss of appetite, constipation;  · sleep problems (insomnia);  · tremors, sweating, feeling anxious or nervous;  · fast heartbeats;  · confusion, agitation, hostility;  · rash;  · weight loss;  · increased urination;  · headache, dizziness; or  · muscle or joint pain. This is not a complete list of side effects and others may occur. Call your doctor for medical advice about side effects. You may report side effects to FDA at 6-601-QGH-6584. What other drugs will affect bupropion? You may have a higher risk of seizures if you use certain other medicines while taking bupropion. Many drugs can affect bupropion. This includes prescription and over-the-counter medicines, vitamins, and herbal products. Not all possible interactions are listed here. Tell your doctor about all your current medicines and any medicine you start or stop using. Where can I get more information? Your pharmacist can provide more information about bupropion. Remember, keep this and all other medicines out of the reach of children, never share your medicines with others, and use this medication only for the indication prescribed. Every effort has been made to ensure that the information provided by [address] is accurate, up-to-date, and complete, but no guarantee is made to that effect. Drug information contained herein may be time sensitive. [employer] information has been compiled for use by healthcare practitioners and consumers in the [place] and therefore [employer] does not warrant that uses outside of the [place] are appropriate, unless specifically indicated otherwise. [employer]'s drug information does not endorse drugs, diagnose patients or recommend therapy. [employer] drug information is an informational resource designed to assist licensed healthcare practitioners in caring for their patients and/or to serve consumers viewing this service as a supplement to, and not a substitute for, the expertise, skill, knowledge and judgment of healthcare practitioners.  The absence of a warning for a given drug or drug combination in no way should be construed to indicate that the drug or drug combination is safe, effective or appropriate for any given patient. [facility] does not assume any responsibility for any aspect of healthcare administered with the aid of information [facility] provides. The information contained herein is not intended to cover all possible uses, directions, precautions, warnings, drug interactions, allergic reactions, or adverse effects. If you have questions about the drugs you are taking, check with your doctor, nurse or pharmacist.  Copyright 5232-5937 [address]: 24.01. Revision date: 1/27/2020. Care instructions adapted under license by [facility] ([facility]). If you have questions about a medical condition or this instruction, always ask your healthcare professional. [place] [address] any warranty or liability for your use of this information.

## 2021-07-28 NOTE — PROGRESS NOTES
[facility]  [facility]  [address] [address]  Dept: [phone]  Dept Fax: [phone]  Loc: [phone]    Deng Bedolla is a 64 y.o. male who presents today for his medical conditions/complaints as noted below. Deng Bedolla is c/o of Depression        HPI:     Chief Complaint   Patient presents with    Depression       HPI    Elva Fritz presents to the office today to discuss anxiety. Him and his significant other recently broke up after 14 years. His significant other moved out of their home a couple of months ago. Elva Fritz admits that the last two years have been difficult as his significant other was blaming him for things he was not doing and his significant other was dealing with mental health issues where he was hearing voices. He admits that he is seeing therapy now for about one month and this has helped with his anxiety. He states he is still having trouble falling asleep because his mind is racing. He states his son has moved in with him which has helped him and his family members have been supportive. He is interested in starting Wellbutrin back short term to help his anxiety again. He did well with Wellbutrin in the past.  He has been taking Buspar as needed for anxiety with some relief. Patient is here today to follow up on hypertension. He has not been taking his full dose of Lisinopril at home because he was taking narcotics for his recent hip pain and he was worried the pain medication was going to lower his blood pressure. He restarted back on his Lisinopril 25 mg today. Is trying to adhere to a no salt diet. Denies any chest pain, leg swelling, orthopnea, dizziness.         Past Medical History:   Diagnosis Date    Degenerative tear of medial meniscus of right knee 7/9/2020    Erectile dysfunction     GERD (gastroesophageal reflux disease)     Hypertension     Hypertriglyceridemia 1/10/2018    Hypothyroidism     Low testosterone     Recurrent kidney stones     Tobacco abuse 1/10/2018      Past Surgical History:   Procedure Laterality Date    APPENDECTOMY      FINGER SURGERY Left     left index finger     HERNIA REPAIR      bilateral    KNEE ARTHROSCOPY Right 8/28/2020    RIGHT KNEE VIDEO ARTHROSCOPY, LIMITED SYNOVECTOMY FOR HOFFA'S PAD, performed by Hayde Cheng MD [address] Right 4/2/2021    RIGHT TOTAL HIP ARTHROPLASTY, DIRECT ANTERIOR APPROACH performed by Coretta Padilla MD at [address]         Family History   Problem Relation Age of Onset    Cancer Mother 79        breast     Arthritis Mother     High Blood Pressure Father     Stroke Father     Heart Attack Father 68    Heart Disease Father     High Cholesterol Father     High Blood Pressure Brother     Other Brother 27        Testicular Cancer     Other Sister         Hypothyrodism    Arthritis Sister     Diabetes Son     Other Son         Hypothyroidism       Social History     Tobacco Use    Smoking status: Current Some Day Smoker     Packs/day: 0.00     Years: 15.00     Pack years: 0.00     Types: Cigars    Smokeless tobacco: Never Used    Tobacco comment: occassional cigar   Substance Use Topics    Alcohol use: Yes     Comment: weekly (1 drink of bourbon in a sitting)       Current Outpatient Medications   Medication Sig Dispense Refill    buPROPion (WELLBUTRIN XL) 150 MG extended release tablet Take 1 tablet by mouth every morning 90 tablet 1    busPIRone (BUSPAR) 5 MG tablet TAKE 1 TO 2 TABLETS UP TO THREE TIMES A DAY AS NEEDED FOR ANXIETY 180 tablet 0    lisinopril (PRINIVIL;ZESTRIL) 5 MG tablet TAKE ONE TABLET BY MOUTH DAILY 90 tablet 1    lisinopril (PRINIVIL;ZESTRIL) 20 MG tablet TAKE ONE TABLET BY MOUTH DAILY 90 tablet 1    sildenafil (VIAGRA) 50 MG tablet TAKE 1 TABLET DAILY AS NEEDED FOR ERECTILE DYSFUNCTION 12 tablet 11    aspirin EC 81 MG EC tablet Take 1 tablet by mouth 2 wound. Allergic/Immunologic: Negative. Neurological: Negative. Negative for dizziness, facial asymmetry, weakness, light-headedness and headaches. Psychiatric/Behavioral: Positive for sleep disturbance. Negative for agitation, behavioral problems, confusion, decreased concentration, dysphoric mood, hallucinations, self-injury and suicidal ideas. The patient is nervous/anxious. The patient is not hyperactive. Objective:     Vitals:    07/28/21 1502 07/28/21 1505 07/28/21 1524   BP: (!) 148/104 (!) 157/101 120/70   Site: Right Upper Arm Right Upper Arm    Position: Sitting Sitting    Cuff Size: Medium Adult Medium Adult    Pulse: 86 85    SpO2: 98%     Weight: 176 lb (79.8 kg)     Height: 6' (1.829 m)       Wt Readings from Last 3 Encounters:   07/28/21 176 lb (79.8 kg)   06/17/21 175 lb (79.4 kg)   05/20/21 175 lb (79.4 kg)     Temp Readings from Last 3 Encounters:   04/05/21 97.4 °F (36.3 °C)   04/02/21 99 °F (37.2 °C)   04/02/21 97.4 °F (36.3 °C) (Temporal)     BP Readings from Last 3 Encounters:   07/28/21 120/70   04/02/21 (!) 86/54   04/02/21 (!) 140/99     Pulse Readings from Last 3 Encounters:   07/28/21 85   04/02/21 92   03/24/21 89     Physical Exam  Vitals and nursing note reviewed. Constitutional:       General: He is not in acute distress. Appearance: Normal appearance. He is well-developed. He is not diaphoretic. HENT:      Head: Normocephalic and atraumatic. Right Ear: Tympanic membrane, ear canal and external ear normal. There is no impacted cerumen. Left Ear: Tympanic membrane, ear canal and external ear normal. There is no impacted cerumen. Nose: Nose normal. No congestion or rhinorrhea. Mouth/Throat:      Mouth: Mucous membranes are moist.      Pharynx: Oropharynx is clear. No oropharyngeal exudate or posterior oropharyngeal erythema. Eyes:      General: No scleral icterus. Right eye: No discharge. Left eye: No discharge. Conjunctiva/sclera: Conjunctivae normal.   Neck:      Vascular: No carotid bruit. Trachea: No tracheal deviation. Cardiovascular:      Rate and Rhythm: Normal rate and regular rhythm. Pulses: Normal pulses. Heart sounds: Normal heart sounds. No murmur heard. No friction rub. No gallop. Pulmonary:      Effort: Pulmonary effort is normal. No respiratory distress. Breath sounds: Normal breath sounds. No stridor. No wheezing, rhonchi or rales. Chest:      Chest wall: No tenderness. Abdominal:      General: Bowel sounds are normal. There is no distension. Palpations: Abdomen is soft. There is no mass. Tenderness: There is no abdominal tenderness. There is no guarding or rebound. Hernia: No hernia is present. Musculoskeletal:         General: No swelling, tenderness, deformity or signs of injury. Normal range of motion. Cervical back: Normal range of motion and neck supple. No rigidity. No muscular tenderness. Right lower leg: No edema. Left lower leg: No edema. Lymphadenopathy:      Cervical: No cervical adenopathy. Skin:     General: Skin is warm and dry. Capillary Refill: Capillary refill takes less than 2 seconds. Coloration: Skin is not jaundiced or pale. Findings: No bruising, erythema, lesion or rash. Neurological:      General: No focal deficit present. Mental Status: He is alert and oriented to person, place, and time. Mental status is at baseline. Cranial Nerves: No cranial nerve deficit. Sensory: No sensory deficit. Motor: No weakness or abnormal muscle tone. Coordination: Coordination normal.      Gait: Gait normal.      Deep Tendon Reflexes: Reflexes are normal and symmetric. Reflexes normal.   Psychiatric:         Mood and Affect: Mood normal.         Behavior: Behavior normal.         Thought Content:  Thought content normal.         Judgment: Judgment normal.         Admission on 04/02/2021, Discharged on 04/02/2021   Component Date Value Ref Range Status    ABO/Rh 04/02/2021 B POS   Final    Antibody Screen 04/02/2021 NEG   Final    POC Glucose 04/02/2021 101* 70 - 99 mg/dl Final    Performed on 04/02/2021 ACCU-CHEK   Final           Assessment & Plan: The following diagnoses and conditions are stable with no further orders unless indicated:  1. Anxiety    2. Essential hypertension    3. Problems in relationship with spouse or partner        Delfino Pang was seen today for depression. Anxiety has improved with therapy, but he is still having problems with insomnia related to his mind racing. He use to take Wellbutrin in the past and is interested in trying again. Rx sent. Educated him on potential side effects and he verbalizes understanding and would like to try medication again. BP elevated today; however, he was not taking his full dose of Lisinopril for fear of hypotension secondary to narcotics. He started back on his Lisinopril 25 mg today. He admits to his blood pressure being well controlled on Lisinopril 25 mg daily when he was taking it as prescribed. He is to send Spontaneously message or call office with BP results in the next 1-2 weeks. Diagnoses and all orders for this visit:    Anxiety  -     buPROPion (WELLBUTRIN XL) 150 MG extended release tablet; Take 1 tablet by mouth every morning    Essential hypertension    Problems in relationship with spouse or partner  -     buPROPion (WELLBUTRIN XL) 150 MG extended release tablet; Take 1 tablet by mouth every morning      Prior to Visit Medications    Medication Sig Taking?  Authorizing Provider   buPROPion (WELLBUTRIN XL) 150 MG extended release tablet Take 1 tablet by mouth every morning Yes [facility], APRN - [facility]   busPIRone (BUSPAR) 5 MG tablet TAKE 1 TO 2 TABLETS UP TO THREE TIMES A DAY AS NEEDED FOR ANXIETY Yes [facility], APRN - CNP   lisinopril (PRINIVIL;ZESTRIL) 5 MG tablet TAKE ONE TABLET BY MOUTH DAILY Yes DI Cormier CNP   lisinopril (PRINIVIL;ZESTRIL) 20 MG tablet TAKE ONE TABLET BY MOUTH DAILY Yes DI Cormier CNP   sildenafil (VIAGRA) 50 MG tablet TAKE 1 TABLET DAILY AS NEEDED FOR ERECTILE DYSFUNCTION Yes DI Cormier - CNP   aspirin EC 81 MG EC tablet Take 1 tablet by mouth 2 times daily for 28 days Yes Gio Valenzuela MD   ketoconazole (NIZORAL) 2 % cream Apply topically to affected area daily Yes DI Cormier CNP   famotidine (PEPCID) 40 MG tablet Take 1 tablet by mouth every evening Yes DI Cormier CNP   ARMOUR THYROID 180 MG tablet daily  Yes Historical Provider, MD   Vitamins B1 B6 B12 (NEURO CATHERINE PO) Take by mouth Yes Historical Provider, MD   Testosterone (ANDROGEL PUMP) 20.25 MG/ACT (1.62%) GEL gel APPLY 2 PUMP ACTUATIONS (40.5 MG/2.5 GM) TOPICALLY ONCE DAILY IN THE MORNING TO THE SHOULDERS AND UPPER ARMS Yes Deb Jim,    Cholecalciferol (VITAMIN D3) 3000 UNITS TABS Take  by mouth. Yes Historical Provider, MD   magnesium 30 MG tablet Take 250 mg by mouth 2 times daily. Yes Historical Provider, MD   tamsulosin (FLOMAX) 0.4 MG capsule Take 1 capsule by mouth daily for 7 days  Gio Valenzuela MD     Orders Placed This Encounter   Medications    buPROPion (WELLBUTRIN XL) 150 MG extended release tablet     Sig: Take 1 tablet by mouth every morning     Dispense:  90 tablet     Refill:  1         Return in about 6 months (around 1/28/2022) for Annual physical .    Patient should call the office immediately with new or ongoing signs or symptoms or worsening, or proceedto the emergency room. No changes in past medical history, past surgical history, social history, or family history were noted during the patient encounter unless specifically listed above. All updates of past medicalhistory, past surgical history, social history, or family history were reviewed personally by me during the office visit.   All problems listed in the assessment are stable unless noted otherwise. Medication profilereviewed personally by me during the office visit. Medication side effects and possible impairments from medications were discussed as applicable. Call if pattern of symptoms change or persists for an extended time. This document was prepared by a combination of typing and transcription through a voice recognition software. All medications have the potential for adverse effects. All medications effect each person differently. Please read and review provided information related to medication. If the medication that you have been prescribed has the potential to cause sedation, do not drive or operate car, truck, or heavy machinery until you know how the medication will effect you. If you experience any adverse effects from the medication, please call the office or report to the emergency department. See someone right away if you want to hurt or kill yourself! -- If you ever feel like you might hurt yourself or someone else, do one of these things:  ?Call your doctor or nurse and tell them it is urgent  ? Call for an ambulance (in the [address] [place] [address],[address] [address] 9-1-1)  ? Go to the emergency room at your local hospital  ?Call the [address] [place] [address]:  [phone]    I've explained to him that drugs of the SSRI class can have side effects such as weight gain, sexual dysfunction, insomnia, headache, nausea. These medications are generally effective at alleviating symptoms of anxiety and/or depression. Let me know if significant side effects do occur.

## 2021-07-30 DIAGNOSIS — E03.8 HYPOTHYROIDISM DUE TO HASHIMOTO'S THYROIDITIS: ICD-10-CM

## 2021-07-30 DIAGNOSIS — E06.3 HYPOTHYROIDISM DUE TO HASHIMOTO'S THYROIDITIS: ICD-10-CM

## 2021-07-30 RX ORDER — THYROID 180 MG
180 TABLET ORAL DAILY
Qty: 30 TABLET | Refills: 0 | Status: SHIPPED | OUTPATIENT
Start: 2021-07-30 | End: 2021-08-27

## 2021-07-30 NOTE — TELEPHONE ENCOUNTER
Future appt scheduled 0 appt scheduled- Return in about 6 months (around 1/28/2022) for Annual physical                 Last appt 07/28/2021      Last Written  05/04/2020      SARBJIT THYROID 180 MG tablet            Pt called and stated that his thyroid doctor is on vacation and has no MD on call. Pt states he out of thyroid meds and just needs a refill until Dr Mary Saxena in [facility] is back from vacation.

## 2021-08-27 DIAGNOSIS — E03.8 HYPOTHYROIDISM DUE TO HASHIMOTO'S THYROIDITIS: ICD-10-CM

## 2021-08-27 DIAGNOSIS — E06.3 HYPOTHYROIDISM DUE TO HASHIMOTO'S THYROIDITIS: ICD-10-CM

## 2021-08-27 RX ORDER — THYROID 180 MG
TABLET ORAL
Qty: 60 TABLET | Refills: 0 | Status: SHIPPED | OUTPATIENT
Start: 2021-08-27

## 2021-08-31 ENCOUNTER — E-VISIT (OUTPATIENT)
Dept: FAMILY MEDICINE CLINIC | Age: 61
End: 2021-08-31
Payer: COMMERCIAL

## 2021-08-31 DIAGNOSIS — L23.7 POISON IVY DERMATITIS: Primary | ICD-10-CM

## 2021-08-31 PROCEDURE — 98970 NQHP OL DIG ASSMT&MGMT 5-10: CPT | Performed by: NURSE PRACTITIONER

## 2021-08-31 RX ORDER — METHYLPREDNISOLONE 4 MG/1
TABLET ORAL
Qty: 1 KIT | Refills: 0 | Status: SHIPPED | OUTPATIENT
Start: 2021-08-31 | End: 2021-09-08 | Stop reason: ALTCHOICE

## 2021-09-08 DIAGNOSIS — L23.7 POISON IVY DERMATITIS: Primary | ICD-10-CM

## 2021-09-08 RX ORDER — PREDNISONE 20 MG/1
40 TABLET ORAL DAILY
Qty: 10 TABLET | Refills: 0 | Status: SHIPPED | OUTPATIENT
Start: 2021-09-08 | End: 2021-09-13

## 2021-09-10 DIAGNOSIS — K21.9 GASTROESOPHAGEAL REFLUX DISEASE WITHOUT ESOPHAGITIS: ICD-10-CM

## 2021-09-10 RX ORDER — FAMOTIDINE 40 MG/1
TABLET, FILM COATED ORAL
Qty: 90 TABLET | Refills: 3 | Status: SHIPPED | OUTPATIENT
Start: 2021-09-10 | End: 2022-09-02

## 2021-09-20 ENCOUNTER — OFFICE VISIT (OUTPATIENT)
Dept: ORTHOPEDIC SURGERY | Age: 61
End: 2021-09-20
Payer: COMMERCIAL

## 2021-09-20 VITALS — RESPIRATION RATE: 12 BRPM | HEIGHT: 72 IN | BODY MASS INDEX: 23.84 KG/M2 | WEIGHT: 176 LBS

## 2021-09-20 DIAGNOSIS — Z96.641 S/P HIP REPLACEMENT, RIGHT: Primary | ICD-10-CM

## 2021-09-20 PROCEDURE — 99213 OFFICE O/P EST LOW 20 MIN: CPT | Performed by: ORTHOPAEDIC SURGERY

## 2021-09-20 NOTE — PROGRESS NOTES
Chief Complaint  Hip Pain (F/u right LINK 4/2. New xrays taken today)      History of Present Illness:  Marito Marlow is a pleasant 64 y.o. male who is 6 months post right total hip arthroplasty, direct anterior approach, for avascular necrosis. Cam  is doing terrific. He has essentially no complaints. Once in a while he feels a pinch with prolonged sitting like in the car for more than 2 hours along the hip flexor. Otherwise no other setbacks. He rarely thinks of his hip. He feels as though his discomfort is soft tissue related. Medical History:  Patient's medications, allergies, past medical, surgical, social and family histories were reviewed and updated as appropriate. Pain Assessment  Location of Pain: Other (Comment) (Hip)  Location Modifiers: Right  Severity of Pain: 0  Quality of Pain: Dull, Aching  Duration of Pain: A few hours  Frequency of Pain: Intermittent  Aggravating Factors: Other (Comment) (Prolonged sitting or driving (2+ hours))  Limiting Behavior: No  Relieving Factors: Other (Comment) (Light movement)  Result of Injury: No  Work-Related Injury: No  Are there other pain locations you wish to document?: No  ROS: Review of systems reviewed from Patient History Form completed today and available in the patient's chart under the Media tab. Pertinent items are noted in HPI  Review of systems reviewed from Patient History Form completed today and available in the patient's chart under the Media tab. Vital Signs:  Resp 12   Ht 6' (1.829 m)   Wt 176 lb (79.8 kg)   BMI 23.87 kg/m²         Neuro: Alert & oriented x 3,  normal,  no focal deficits noted. Normal affect. Eyes: sclera clear  Ears: Normal external ear  Mouth:  No perioral lesions  Pulm: Respirations unlabored and regular  Pulse: Extremities well perfused. 2+ peripheral pulses. Skin: Warm. No ulcerations. Constitutional: The physical examination finds the patient to be well-developed and well-nourished. The patient is alert and oriented x3 and was cooperative throughout the visit. Hip exam    Hip Examination: RIGHT    Skin/Inspection: no skin lesions, cellulitis, or extreme edema in the lower extremities. Standing/Walking: Normal non-antalgic gait, no pelvic tilt, negative Trendelenburg sign. No use of assistive devices    Sitting Exam: 5/5 Hip Flexor Strength, 5/5 Abductor Strength, 5/5 Adductor strength, Negative Straight Leg Raise    Supine Exam: Non tender around the ASIS, AIIS  Flexion arc 0 to 100deg, with no pain or difficulty. Special Tests: Negative Deep Flexion Test, Negative FADIR , no pain with VANESSA, negative resisted sit-up (Athletic Pubalgia). Side Lying Exam: Non-tender at greater trochanter, abductor musculature, nor TFL origin. Abductor side leg raise 5/5 strength. Negative OberTest    Prone Exam: positive hip flexion contracture, internal rotation to 15 deg, external rotation to 30 deg. Non-tender at the ischial tuberosity nor proximal hamstring      Diagnostics:  Radiology:       Pertinent imaging reviewed, images only - no report available. Radiographs were obtained and reviewed in the office; 3 views: AP Pelvis and right hip 45-deg Faria View, and right False Profile View      Impression: stable LINK implant with no signs of loosening, subsidence. Assessment: Patient is a 64 y.o. male who is 6 months post right hip total arthroplasty with some mild intermittent hip flexor irritation related to psoas/rectus femoris muscle tightness. He is otherwise doing terrific. Impression:      Office Procedures:  No orders of the defined types were placed in this encounter.     Orders Placed This Encounter   Procedures    XR HIP RIGHT (2-3 VIEWS)     Standing Status:   Future     Number of Occurrences:   1     Standing Expiration Date:   9/20/2022     Scheduling Instructions:      AP Pelvis and Lateral       Plan:   I would like to see Delfino castillo in 6 months for routine postoperative follow-up and x-rays at the 1 year laura. I showed him some hip flexor and quadricep stretches today which I would recommend he do consistently. All the patient's questions were answered while in the clinic. The patient is understanding of all instructions and agrees with the plan. Approximately 30 minutes was spent on patient education and coordinating care. Follow up in: Return in about 6 months (around 3/20/2022). Sincerely,    Olimpia Altman MD [address] [facility]   [address], [address]  Email: [email or web address]  Office: [phone]    09/20/21  5:22 PM      The encounter with Caridad Teresa was carried out by myself, Dr Yennifer Branham, who personally examined the patient and reviewed the plan. This dictation was performed with a verbal recognition program (DRAGON) and it was checked for errors. It is possible that there are still dictated errors within this office note. If so, please bring any errors to my attention for an addendum. All efforts were made to ensure that this office note is accurate.

## 2021-11-04 ENCOUNTER — TELEPHONE (OUTPATIENT)
Dept: ORTHOPEDIC SURGERY | Age: 61
End: 2021-11-04

## 2021-11-04 NOTE — TELEPHONE ENCOUNTER
LVM FOR PATIENT, 3/21 APPT AT [facility] HAS BEEN MOVED TO 3/24 AT THE NEW OFFICE IN [place] @ 1:15.

## 2022-01-19 NOTE — OP NOTE
How Severe Is Your Skin Lesion?: moderate Operative Note      Patient: Saba Cazares  YOB: 1960  MRN: [id number]    Date of Procedure: 4/2/2021    Pre-Op Diagnosis: Primary osteoarthritis of right hip [M16.11]    Post-Op Diagnosis: Same       Procedure(s):  RIGHT TOTAL HIP ARTHROPLASTY, DIRECT ANTERIOR APPROACH    Surgeon(s):  MD Alexsander Macias MD Michalene Goodie, MD    Assistant:   Surgical Assistant: Dai Valdes    Anesthesia: General    Estimated Blood Loss (mL): 229     Complications: None    Specimens:   ID Type Source Tests Collected by Time Destination   A : FEMORAL HEAD  Bone Bone SURGICAL PATHOLOGY Abe Sadler MD 4/2/2021 8305        Implants:  * No implants in log *  Saul-Biomet G7 OseoTi Size 58 Cup  Saul-[employer]omet 40 +0 Ceramic Head  Saul-Biomet Size 6 High Offset Avenic Stem  25mm Screw  30mm Screw      Drains: * No LDAs found *    Findings: Avascular necrosis of the femoral head. Detailed Description of Procedure:       Operative Report: Indications: The patient is a 64 y.o. male with persistent right hip pain from post avascular necrosis osteoarthritis of the hip,  that interferes with daily activity. The he has failed conservative treatment and after reviewing the risks, benefits and alternatives, he has elected to undergo a total hip arthroplasty. I have reviewed the benefits and draw backs of an anterior approach and he is prepared to proceed, consent was obtained and all questions were answered to his satisfaction. Description:   The patient was identified in the preoperative holding area and the correct site of the right hip was marked. He was then brought to the operating room and kept on the hospital bed in the supine position and general anesthesia was administered. Well-padded ski boots were placed on both feet to secure them into the [place] table.   He was then transferred to the operative table and placed against a well-padded perineal post with both legs secured into the Have Your Skin Lesions Been Treated?: not been treated lower limbs spars. Surgical time out was called verifying necessary data including the administration of preoperative antibiotics. The right hip area was then prepped and draped in standard sterile fashion. Bony landmarks were palpated and an incision was made with a #10 blade beginning 1 cm lateral to the anterior superior iliac spine and extending in a diagonal course over the tensor fascia daniela muscle for distance of about 8 cm. The subcutaneous tissue was dissected with electrocautery and retractors were positioned. The fascia of the tensor fascia daniela muscle was indentified and divided away from its border with the sartorius to help protect the lateral femoral cutaneous nerve. Blunt dissection was then carried out beneath the fascia to expose the interval between the tensor fascia daniela and the sartorius. Retractors were positioned to allow visualization of the deeper level. Electrocautery was used to coagulate the circumflex vessels and the reflected head of the rectus femoris was retracted to expose the hip capsule. The capsule was opened with electrocautery and the anterior portion excised. Retractors were then positioned along the femoral neck. The hip was brought into external rotation and debridement with electrocautery for removal of the anterior and superior portion of the acetabular labrum was performed. The lesser trochanter was identified in the base of the wound and the neck resection was then planned. The hip was brought back to neutral rotation and the sagittal saw was used to create a neck resection. The neck cut was completed posteriorly with an osteotome. The femoral head was then removed with a corkscrew device. Inspection of the femoral head showed global collapse consistent with avascular necrosis, size 54 at its widest diameter. It was then measured on the back table as a size 54 mm.      Retractors were then positioned to expose the acetabulum with care to ensure Is This A New Presentation, Or A Follow-Up?: Skin Lesion positioning the anterior retractor carefully over the anterior portion of the acetabulum protecting both the femoral vessels and the nerve. The acetabulum was cleared of soft tissue with a rongeur and bleeding sealed with Aquamantis cautery. The remaining labrum was debrided with electrocautery. Acetabular reaming then began with a size 53 mm reamer. The acetabular reaming was continued up in 2 mm increments until removal of the cartilage and sclerotic bone was complete and a good area of bleeding bone was encountered. Fluoroscopy was used intermittently to verify the trajectory of the reaming as well as its depth relative to the teardrop. The area was thoroughly irrigated and the acetabular cup was then impacted into position under fluoroscopic guidance. It showed excellent fit with opposition to the pelvis and no evidence of micro-motion. The impactor was removed and two acetabular screw was placed in the safe zone. The polyethylene liner was then inserted and snapped into position and shown to be flush with the components as per technique. Attention was then turned to the femoral side, the support hook was placed laterally over the vastus lateralis and the hip was brought into full external rotation at about 120 degrees. The hip was then extended and abducted. The support hook was secured to the table and the proximal portion of the femur was carefully raised under direct visualization to improve exposure. Retractors were positioned along the femoral calcar and posteriorly to protect the tensor fascia muscle. Carefully some the posterior capsule was released to improve exposure. The canal was then opened proximally and using a hand rasp its direction verified. The starting broach was then used with care taken to insure appropriate version relative to the posterior femoral cortex. The femur was then broached up as per technique and the final broach showed excellent seating.   Its relationship to Additional History: Patient has been applying Cortisone and lotrimin to area. the calcar was 1mm proud. A trial high offset was placed as well as a trial 40 mm + 0 mm femoral ball. The femoral support hook was lowered and the hip was brought back into the reduced position. Fluoroscopic views were obtained of the AP pelvis and referencing the lesser trochanter on the contralateral hip and estimation of offset and limb length were made. They were judged to be the best approximation to provide stability for the sizes. The hip was then dislocated again and placed back into the broaching position. The trials were removed and the area was thoroughly irrigated. The final femoral implant was then impacted into position and shown to be resting at the level of the calcar similar to the trials. The final femoral head was also impacted into position and shown to be secured. The femoral support hook was removed and the hip was brought into reduction again. Fluoroscopic views showed similar limb length and offset relative to the trial reduction. The joint shuck on the operative table was satisfactory. The hip was brought through range of motion after removing the limb from the spar and brought to flexion and shown to be stable. External  rotation with the hip extended was satisfactory and there was no evidence of component impingement. The area was thoroughly irrigated again. The aquamantis was used to verify adequacy of hemostasis by treating the capsular structures. An injection of orthopedic analgesic mixture was carefully infiltrated with careful aspiration around the capsular area to improve postoperative analgesia. A solution of dilute poviodine was placed in the wound and allowed to sit for 2-3 minutes to aid in bacterial control. It was then removed with suction and cleansed again with pulsatile lavage. The tensor fascia daniela fascia was then closed with running #2 Strata Fix suture. Skin was closed with 2-0 Vicryl and 3-0 Monocryl.   Dermabond and Prineo dressing was applied and allowed to dry, then telfa and tegaderm were used to seal the wound. The patient was then removed from the operative table, awakened and taken to recovery room in stable condition having tolerated the procedure well. The patient's foot was noted to be warm and pink color removal of the traction boot. No significant skin lesions were noted either on the feet. All needle and sponge counts matched the initial count per circulating and scrub personnel x2 prior to terminating this case. Dr Luster Osler assisted me during this surgery. His services were required to assist with the procedure by providing help with patient positioning and prepping, exposure, retraction and closure. Modifier 22 applied to this case given that it was an avascular necrosis, requiring extra attention with careful reaming of softer acetabular bone with a deficient anterior wall. As well this involved carefully modifying the height of the neck cut of the femur to allow the implant to sit more proud and as well using higher offset, to restore better stability in an AVN case that usually leads to high rates of instability. Post-operatively, the patient is same day discharge, WBAT, with anterior precautions x 6 weeks. To follow-up in my office in 1 week for xray and exam. Can shower day 3. To take ASA 81mg PO BID x 1 month, and 500mg PO BID naprosyn for 2 weeks. PT can start next week after surgery. Sincerely,        Richard Jordan MD [address] [place] [address] [email or web address] [place] [address], [facility] Mary Ann Francod  Email: [email or web address]  Office: [phone]      04/02/21  11:35 AM          Electronically signed by Richard Jordan MD on 4/2/2021 at 11:35 AM

## 2022-02-08 ENCOUNTER — TELEPHONE (OUTPATIENT)
Dept: FAMILY MEDICINE CLINIC | Age: 62
End: 2022-02-08

## 2022-02-08 NOTE — TELEPHONE ENCOUNTER
----- Message from Mika New sent at 2/8/2022  2:42 PM EST -----  Subject: Message to Provider    QUESTIONS  Information for Provider? pt is calling wanting to know if Fide will   establish with his son that just moved here. Please call and advise.   ---------------------------------------------------------------------------  --------------  CALL BACK INFO  What is the best way for the office to contact you? OK to leave message on   voicemail  Preferred Call Back Phone Number? [phone]  ---------------------------------------------------------------------------  --------------  SCRIPT ANSWERS  Relationship to Patient?  Self

## 2022-03-23 ENCOUNTER — OFFICE VISIT (OUTPATIENT)
Dept: ORTHOPEDIC SURGERY | Age: 62
End: 2022-03-23
Payer: COMMERCIAL

## 2022-03-23 VITALS — HEIGHT: 72 IN | BODY MASS INDEX: 24.38 KG/M2 | WEIGHT: 180 LBS

## 2022-03-23 DIAGNOSIS — Z96.641 S/P HIP REPLACEMENT, RIGHT: Primary | ICD-10-CM

## 2022-03-23 PROCEDURE — 99213 OFFICE O/P EST LOW 20 MIN: CPT | Performed by: ORTHOPAEDIC SURGERY

## 2022-03-23 NOTE — LETTER
[facility]  [address] [facility] [address]  Phone: [phone]  Fax: [phone]    Bubba Fernandez MD    March 24, 2022     Caleb Da Silva, APRN - [facility]  [address] [facility],[address]    Patient: Rachel Lepe   MR Number: [id number]   YOB: 1960   Date of Visit: 3/23/2022       Dear Caleb Da Silva:    Thank you for referring Rachel Lepe to me for evaluation/treatment. Below are the relevant portions of my assessment and plan of care. If you have questions, please do not hesitate to call me. I look forward to following Emily Ibarra along with you.     Sincerely,      Bubba Fernandez MD

## 2022-03-24 NOTE — PROGRESS NOTES
Chief Complaint  Post-Op Check (1 YEAR POST OP RIGHT LINK 4/2/2021)      History of Present Illness:  Eliazar Hollis is a pleasant 63 y.o. male who is 1 year post right total hip replacement, direct anterior approach. He is doing terrific. 100% improvement. He has started a new job and has been wearing very heavy belt on the right side. This is has caused the occasional anterior hip discomfort. He is taking a moving to [place] potentially in the next year or 2. Medical History:  Patient's medications, allergies, past medical, surgical, social and family histories were reviewed and updated as appropriate. Pain Assessment  Location of Pain:  (HIP)  Location Modifiers: Right  Severity of Pain: 0  Limiting Behavior: No  Result of Injury: No  Work-Related Injury: No  Are there other pain locations you wish to document?: No  ROS: Review of systems reviewed from Patient History Form completed today and available in the patient's chart under the Media tab. Pertinent items are noted in HPI  Review of systems reviewed from Patient History Form completed today and available in the patient's chart under the Media tab. Vital Signs:  Ht 6' (1.829 m)   Wt 180 lb (81.6 kg)   BMI 24.41 kg/m²         Neuro: Alert & oriented x 3,  normal,  no focal deficits noted. Normal affect. Eyes: sclera clear  Ears: Normal external ear  Mouth:  No perioral lesions  Pulm: Respirations unlabored and regular  Pulse: Extremities well perfused. 2+ peripheral pulses. Skin: Warm. No ulcerations. Constitutional: The physical examination finds the patient to be well-developed and well-nourished. The patient is alert and oriented x3 and was cooperative throughout the visit. Hip Examination: right    Skin/Inspection: No skin lesions, cellulitis, or extreme edema in the lower extremities. Standing/Walking: normal  gait, negative Trendelenburg sign.      Sitting/Supine Exam: Non tender around the major bony prominences  full range of motion  Resisted Abduction 5/5   Resisted Adduction 5/5   Resisted  Flexion 5/5  not tender at greater trochanter  Leg Lengths  Equal    Distal Neurovascular exam is intact (foot sensation, pulses, and motor exam)        Diagnostics:  3 View X-rays (AP Pelvis, Faria View and False Profile) of both the  right hip were obtained and reviewed in office. Impression: Stable total hip implant fully integrated no signs of loosening or subsidence. Assessment: Patient is a 63 y.o. male he was doing terrific 1 year post total hip arthroplasty. He has experienced a  full recovery. Impression:      Office Procedures:  No orders of the defined types were placed in this encounter. Orders Placed This Encounter   Procedures    XR HIP RIGHT (2-3 VIEWS)     Standing Status:   Future     Number of Occurrences:   1     Standing Expiration Date:   4/22/2022       Plan:  Gracia Villalobos is doing terrific. He has had a full recovery. We recommend that He continuing with maintenance  home exercises. At this point I am happy to see him back as needed! All the patient's questions were answered while in the clinic. The patient is understanding of all instructions and agrees with the plan. Approximately 30 minutes was spent on patient education and coordinating care. Follow up in: Return if symptoms worsen or fail to improve. Sincerely,    Vincenzo Chong MD [address], [address]  Email: [email or web address]  Office: [phone]    03/24/22  10:08 AM        The encounter with Kevin Lizy was carried out by myself, Dr Landon Bean, who personally examined the patient and reviewed the plan. This dictation was performed with a verbal recognition program (DRAGON) and it was checked for errors.   It is possible that there are still dictated errors within this office note. If so, please bring any errors to my attention for an addendum. All efforts were made to ensure that this office note is accurate.

## 2022-04-18 RX ORDER — LISINOPRIL 40 MG/1
40 TABLET ORAL DAILY
Qty: 90 TABLET | Refills: 0 | Status: SHIPPED | OUTPATIENT
Start: 2022-04-18 | End: 2022-08-01

## 2022-04-18 NOTE — TELEPHONE ENCOUNTER
Past due for [facility] - please schedule correction within the next 90 days. He is past due for labs as well - he will need fasting labs as well.   [facility] should be 40 min in office appt

## 2022-05-06 ENCOUNTER — OFFICE VISIT (OUTPATIENT)
Dept: FAMILY MEDICINE CLINIC | Age: 62
End: 2022-05-06
Payer: COMMERCIAL

## 2022-05-06 VITALS
DIASTOLIC BLOOD PRESSURE: 88 MMHG | WEIGHT: 185 LBS | HEIGHT: 72 IN | OXYGEN SATURATION: 96 % | HEART RATE: 78 BPM | SYSTOLIC BLOOD PRESSURE: 135 MMHG | BODY MASS INDEX: 25.06 KG/M2

## 2022-05-06 DIAGNOSIS — M62.830 MUSCLE SPASM OF BACK: ICD-10-CM

## 2022-05-06 DIAGNOSIS — S39.012A STRAIN OF LUMBAR REGION, INITIAL ENCOUNTER: Primary | ICD-10-CM

## 2022-05-06 DIAGNOSIS — I10 ESSENTIAL HYPERTENSION: ICD-10-CM

## 2022-05-06 PROCEDURE — 99214 OFFICE O/P EST MOD 30 MIN: CPT | Performed by: NURSE PRACTITIONER

## 2022-05-06 RX ORDER — CYCLOBENZAPRINE HCL 5 MG
TABLET ORAL
Qty: 45 TABLET | Refills: 0 | Status: SHIPPED | OUTPATIENT
Start: 2022-05-06

## 2022-05-06 RX ORDER — HYDROCHLOROTHIAZIDE 25 MG/1
25 TABLET ORAL EVERY MORNING
Qty: 30 TABLET | Refills: 1 | Status: SHIPPED | OUTPATIENT
Start: 2022-05-06 | End: 2022-10-19 | Stop reason: SDUPTHER

## 2022-05-06 RX ORDER — NAPROXEN 500 MG/1
500 TABLET ORAL 2 TIMES DAILY WITH MEALS
Qty: 60 TABLET | Refills: 0
Start: 2022-05-06 | End: 2022-06-17 | Stop reason: ALTCHOICE

## 2022-05-06 ASSESSMENT — PATIENT HEALTH QUESTIONNAIRE - PHQ9
2. FEELING DOWN, DEPRESSED OR HOPELESS: 0
SUM OF ALL RESPONSES TO PHQ9 QUESTIONS 1 & 2: 0
1. LITTLE INTEREST OR PLEASURE IN DOING THINGS: 0
SUM OF ALL RESPONSES TO PHQ QUESTIONS 1-9: 0

## 2022-05-06 ASSESSMENT — ENCOUNTER SYMPTOMS
BOWEL INCONTINENCE: 0
ABDOMINAL PAIN: 0
RESPIRATORY NEGATIVE: 1
BACK PAIN: 1

## 2022-05-06 NOTE — PROGRESS NOTES
Jacob  PHYSICIAN PRACTICES  [facility]  [address] [address]  Dept: [phone]  Dept Fax: [phone]  Loc: [phone]    Sylvain Harden is a 63 y.o. male who presents today for his medical conditions/complaints as noted below. Sylvain Harden is c/o of Back Pain (pt has had back pain for the past 2 weeks. pt states it is not going away. )        HPI:     Chief Complaint   Patient presents with    Back Pain     pt has had back pain for the past 2 weeks. pt states it is not going away. Back Pain  This is a new problem. The current episode started 1 to 4 weeks ago (2 weeks ). The problem occurs constantly. The problem has been gradually improving since onset. The pain is present in the gluteal. The quality of the pain is described as aching. The pain does not radiate. The pain is at a severity of 8/10. The pain is mild. The pain is the same all the time. The symptoms are aggravated by lying down, position, bending and twisting. Stiffness is present all day. Pertinent negatives include no abdominal pain, bladder incontinence, bowel incontinence, chest pain, dysuria, fever, headaches, leg pain, numbness, paresis, paresthesias, pelvic pain, perianal numbness, tingling, weakness or weight loss. Risk factors include recent trauma (Lifting heavy item ). He has tried NSAIDs for the symptoms. The treatment provided mild relief. Patient is here today to follow up on hypertension. Taking medications as prescribed. Is trying to adhere to a no salt diet. Denies any chest pain, leg swelling, orthopnea, dizziness. He admits to noticing his blood pressure being elevated at home in the 130-140's/80-90s. He feels like his blood pressure is higher today because he is in pain.      Past Medical History:   Diagnosis Date    Degenerative tear of medial meniscus of right knee 7/9/2020    Erectile dysfunction     GERD (gastroesophageal reflux disease)     Hypertension     Hypertriglyceridemia 1/10/2018    Hypothyroidism     Low testosterone     Recurrent kidney stones     Tobacco abuse 1/10/2018      Past Surgical History:   Procedure Laterality Date    APPENDECTOMY      FINGER SURGERY Left     left index finger     HERNIA REPAIR      bilateral    KNEE ARTHROSCOPY Right 8/28/2020    RIGHT KNEE VIDEO ARTHROSCOPY, LIMITED SYNOVECTOMY FOR HOFFA'S PAD, performed by Leanne Milan MD [address] Right 4/2/2021    RIGHT TOTAL HIP ARTHROPLASTY, DIRECT ANTERIOR APPROACH performed by Gio Valenzuela MD at [facility]         Family History   Problem Relation Age of Onset    Cancer Mother 79        breast     Arthritis Mother     High Blood Pressure Father     Stroke Father     Heart Attack Father 68    Heart Disease Father     High Cholesterol Father     High Blood Pressure Brother     Other Brother 27        Testicular Cancer     Other Sister         Hypothyrodism    Arthritis Sister     Diabetes Son     Other Son         Hypothyroidism       Social History     Tobacco Use    Smoking status: Current Some Day Smoker     Packs/day: 0.00     Years: 15.00     Pack years: 0.00     Types: Cigars    Smokeless tobacco: Never Used    Tobacco comment: occassional cigar   Substance Use Topics    Alcohol use: Yes     Comment: weekly (1 drink of bourbon in a sitting)       Current Outpatient Medications   Medication Sig Dispense Refill    hydroCHLOROthiazide (HYDRODIURIL) 25 MG tablet Take 1 tablet by mouth every morning 30 tablet 1    cyclobenzaprine (FLEXERIL) 5 MG tablet Take 1-2 tablets by mouth nightly for muscle spasms 45 tablet 0    naproxen (NAPROSYN) 500 MG tablet Take 1 tablet by mouth 2 times daily (with meals) 60 tablet 0    lisinopril (PRINIVIL;ZESTRIL) 40 MG tablet Take 1 tablet by mouth daily 90 tablet 0    busPIRone (BUSPAR) 5 MG tablet TAKE 1 TO 2 TABLETS UP TO THREE TIMES A DAY AS NEEDED FOR ANXIETY (NEED FOLLOW UP OFFICE VISIT FOR ADDITIONAL REFILLS) 180 tablet 1    famotidine (PEPCID) 40 MG tablet TAKE 1 TABLET EVERY EVENING 90 tablet 3    triamcinolone (KENALOG) 0.1 % ointment Apply to affected area sparingly two times a day for 5 to 7 days only 30 g 0    ARMOUR THYROID 180 MG tablet TAKE ONE TABLET BY MOUTH DAILY 60 tablet 0    sildenafil (VIAGRA) 50 MG tablet TAKE 1 TABLET DAILY AS NEEDED FOR ERECTILE DYSFUNCTION 12 tablet 11    ketoconazole (NIZORAL) 2 % cream Apply topically to affected area daily 30 g 0    Vitamins B1 B6 B12 (NEURO CATHERINE PO) Take by mouth      Testosterone (ANDROGEL PUMP) 20.25 MG/ACT (1.62%) GEL gel APPLY 2 PUMP ACTUATIONS (40.5 MG/2.5 GM) TOPICALLY ONCE DAILY IN THE MORNING TO THE SHOULDERS AND UPPER ARMS 75 g 2    Cholecalciferol (VITAMIN D3) 3000 UNITS TABS Take  by mouth.  magnesium 30 MG tablet Take 250 mg by mouth 2 times daily. No current facility-administered medications for this visit. Allergies   Allergen Reactions    Atorvastatin      Myalgias     Protonix [Pantoprazole Sodium] Hives       :      Review of Systems   Constitutional: Negative. Negative for fever and weight loss. Respiratory: Negative. Cardiovascular: Negative. Negative for chest pain. Gastrointestinal: Negative for abdominal pain and bowel incontinence. Genitourinary: Negative for bladder incontinence, dysuria and pelvic pain. Musculoskeletal: Positive for back pain. Negative for arthralgias, gait problem, joint swelling, myalgias, neck pain and neck stiffness. Skin: Negative. Neurological: Negative. Negative for tingling, weakness, numbness, headaches and paresthesias. Psychiatric/Behavioral: Negative.           Objective:     Vitals:    05/06/22 0843 05/06/22 0903   BP: (!) 174/113 135/88   Site: Right Upper Arm    Position: Sitting    Cuff Size: Medium Adult    Pulse: 78    SpO2: 96%    Weight: 185 lb (83.9 kg)    Height: 6' (1.829 m)      Wt Readings from Last 3 Encounters:   05/06/22 185 lb (83.9 kg)   03/23/22 180 lb (81.6 kg)   09/20/21 176 lb (79.8 kg)     Temp Readings from Last 3 Encounters:   04/05/21 97.4 °F (36.3 °C)   04/02/21 99 °F (37.2 °C)   04/02/21 97.4 °F (36.3 °C) (Temporal)     BP Readings from Last 3 Encounters:   05/06/22 135/88   07/28/21 120/70   04/02/21 (!) 86/54     Pulse Readings from Last 3 Encounters:   05/06/22 78   07/28/21 85   04/02/21 92     Physical Exam  Vitals and nursing note reviewed. Constitutional:       General: He is not in acute distress. Appearance: Normal appearance. He is well-developed. He is not diaphoretic. HENT:      Head: Normocephalic and atraumatic. Right Ear: External ear normal.      Left Ear: External ear normal.      Nose: Nose normal.   Eyes:      General:         Right eye: No discharge. Left eye: No discharge. Conjunctiva/sclera: Conjunctivae normal.   Cardiovascular:      Rate and Rhythm: Normal rate. Pulmonary:      Effort: Pulmonary effort is normal.   Musculoskeletal:         General: No deformity. Cervical back: Normal range of motion and neck supple. No rigidity. Thoracic back: Spasms and tenderness present. Decreased range of motion. Lumbar back: Spasms and tenderness present. Decreased range of motion. Positive right straight leg raise test and positive left straight leg raise test.   Skin:     General: Skin is warm and dry. Coloration: Skin is not jaundiced or pale. Findings: No bruising, erythema, lesion or rash. Neurological:      Mental Status: He is alert and oriented to person, place, and time. Mental status is at baseline. Psychiatric:         Mood and Affect: Mood normal.         Behavior: Behavior normal.         Thought Content:  Thought content normal.         Judgment: Judgment normal.         Admission on 04/02/2021, Discharged on 04/02/2021   Component Date Value Ref Range Status    ABO/Rh 04/02/2021 B POS   Final    Antibody Screen 04/02/2021 NEG   Final    POC Glucose 04/02/2021 101* 70 - 99 mg/dl Final    Performed on 04/02/2021 ACCU-CHEK   Final           Assessment & Plan: The following diagnoses and conditions are stable with no further orders unless indicated:  1. Strain of lumbar region, initial encounter    2. Muscle spasm of back    3. Essential hypertension        Tatum Zambrano was seen today for back pain. Concern for strain of lumbar region secondary to him trying to lift temporary machinery. Flexeril prescribed to help with muscle spasms muscle tightness lower back region. Recommend him taking naproxen 2 times daily for the next 2 weeks to help with the pain and inflammation. He may also try to take Tylenol as well. Recommend him alternate between heat and ice. May try lidocaine patches as well. Will consider further imaging versus orthopedic referral if pain fails to improve. He was given a referral for massage therapy or possibly receiving chiropractic to see about getting a massage there it covered with his insurance, but he would have to call his insurance to determine further. Blood pressure is elevated today. Home blood pressure readings have been 130-140/80-90s. Blood pressure may be elevated today secondary to his pain. Will add on hydrochlorothiazide 25 mg daily. He has an appointment scheduled in about 1 month. We will see how blood pressures running along with checking his BMP at that time. Recommend him continuing to eat a no salt added diet. Diagnoses and all orders for this visit:    Strain of lumbar region, initial encounter  -     External Referral To Massage Therapy  -     External Referral To Chiropractic  -     naproxen (NAPROSYN) 500 MG tablet; Take 1 tablet by mouth 2 times daily (with meals)    Muscle spasm of back  -     cyclobenzaprine (FLEXERIL) 5 MG tablet;  Take 1-2 tablets by mouth nightly for muscle spasms  -     External Referral To Chiropractic  -     naproxen (NAPROSYN) 500 MG tablet; Take 1 tablet by mouth 2 times daily (with meals)    Essential hypertension  -     hydroCHLOROthiazide (HYDRODIURIL) 25 MG tablet; Take 1 tablet by mouth every morning      Prior to Visit Medications    Medication Sig Taking? Authorizing Provider   hydroCHLOROthiazide (HYDRODIURIL) 25 MG tablet Take 1 tablet by mouth every morning Yes DI Buenrostro CNP   cyclobenzaprine (FLEXERIL) 5 MG tablet Take 1-2 tablets by mouth nightly for muscle spasms Yes DI Buenrostro CNP   naproxen (NAPROSYN) 500 MG tablet Take 1 tablet by mouth 2 times daily (with meals) Yes DI Buenrostro CNP   lisinopril (PRINIVIL;ZESTRIL) 40 MG tablet Take 1 tablet by mouth daily Yes DI Buenrostro CNP   busPIRone (BUSPAR) 5 MG tablet TAKE 1 TO 2 TABLETS UP TO THREE TIMES A DAY AS NEEDED FOR ANXIETY (NEED FOLLOW UP OFFICE VISIT FOR ADDITIONAL REFILLS) Yes DI Buenrostro CNP   famotidine (PEPCID) 40 MG tablet TAKE 1 TABLET EVERY EVENING Yes DI Buenrostro CNP   triamcinolone (KENALOG) 0.1 % ointment Apply to affected area sparingly two times a day for 5 to 7 days only Yes DI Buenrostro CNP   ARMOUR THYROID 180 MG tablet TAKE ONE TABLET BY MOUTH DAILY Yes Izzy Gold MD   sildenafil (VIAGRA) 50 MG tablet TAKE 1 TABLET DAILY AS NEEDED FOR ERECTILE DYSFUNCTION Yes DI Buenrostro CNP   ketoconazole (NIZORAL) 2 % cream Apply topically to affected area daily Yes DI Buenrostro CNP   Vitamins B1 B6 B12 (NEURO CATHERINE PO) Take by mouth Yes Historical Provider, MD   Testosterone (ANDROGEL PUMP) 20.25 MG/ACT (1.62%) GEL gel APPLY 2 PUMP ACTUATIONS (40.5 MG/2.5 GM) TOPICALLY ONCE DAILY IN THE MORNING TO THE SHOULDERS AND UPPER ARMS Yes Deb Jim,    Cholecalciferol (VITAMIN D3) 3000 UNITS TABS Take  by mouth. Yes Historical Provider, MD   magnesium 30 MG tablet Take 250 mg by mouth 2 times daily.  Yes Historical Provider, MD     Orders Placed This Encounter   Medications    hydroCHLOROthiazide (HYDRODIURIL) 25 MG tablet     Sig: Take 1 tablet by mouth every morning     Dispense:  30 tablet     Refill:  1    cyclobenzaprine (FLEXERIL) 5 MG tablet     Sig: Take 1-2 tablets by mouth nightly for muscle spasms     Dispense:  45 tablet     Refill:  0    naproxen (NAPROSYN) 500 MG tablet     Sig: Take 1 tablet by mouth 2 times daily (with meals)     Dispense:  60 tablet     Refill:  0         Return if symptoms worsen or fail to improve. Patient should call the office immediately with new or ongoing signs or symptoms or worsening, or proceedto the emergency room. No changes in past medical history, past surgical history, social history, or family history were noted during the patient encounter unless specifically listed above. All updates of past medicalhistory, past surgical history, social history, or family history were reviewed personally by me during the office visit. All problems listed in the assessment are stable unless noted otherwise. Medication profilereviewed personally by me during the office visit. Medication side effects and possible impairments from medications were discussed as applicable. Call if pattern of symptoms change or persists for an extended time. This document was prepared by a combination of typing and transcription through a voice recognition software. All medications have the potential for adverse effects. All medications effect each person differently. Please read and review provided information related to medication. If the medication that you have been prescribed has the potential to cause sedation, do not drive or operate car, truck, or heavy machinery until you know how the medication will effect you. If you experience any adverse effects from the medication, please call the office or report to the emergency department.

## 2022-05-06 NOTE — PATIENT INSTRUCTIONS
Patient Education        Low Back Pain: Exercises  Introduction  Here are some examples of exercises for you to try. The exercises may be suggested for a condition or for rehabilitation. Start each exercise slowly. Ease off the exercises if you start to have pain. You will be told when to start these exercises and which ones will work bestfor you. How to do the exercises  Press-up    1. Lie on your stomach, supporting your body with your forearms. 2. Press your elbows down into the floor to raise your upper back. As you do this, relax your stomach muscles and allow your back to arch without using your back muscles. As your press up, do not let your hips or pelvis come off the floor. 3. Hold for 15 to 30 seconds, then relax. 4. Repeat 2 to 4 times. Alternate arm and leg (bird dog) exercise    Do this exercise slowly. Try to keep your body straight at all times, and donot let one hip drop lower than the other. 1. Start on the floor, on your hands and knees. 2. Tighten your belly muscles. 3. Raise one leg off the floor, and hold it straight out behind you. Be careful not to let your hip drop down, because that will twist your trunk. 4. Hold for about 6 seconds, then lower your leg and switch to the other leg. 5. Repeat 8 to 12 times on each leg. 6. Over time, work up to holding for 10 to 30 seconds each time. 7. If you feel stable and secure with your leg raised, try raising the opposite arm straight out in front of you at the same time. Knee-to-chest exercise    1. Lie on your back with your knees bent and your feet flat on the floor. 2. Bring one knee to your chest, keeping the other foot flat on the floor (or keeping the other leg straight, whichever feels better on your lower back). 3. Keep your lower back pressed to the floor. Hold for at least 15 to 30 seconds. 4. Relax, and lower the knee to the starting position. 5. Repeat with the other leg. Repeat 2 to 4 times with each leg.   6. To get more stretch, put your other leg flat on the floor while pulling your knee to your chest.  Curl-ups    1. Lie on the floor on your back with your knees bent at a 90-degree angle. Your feet should be flat on the floor, about 12 inches from your buttocks. 2. Cross your arms over your chest. If this bothers your neck, try putting your hands behind your neck (not your head), with your elbows spread apart. 3. Slowly tighten your belly muscles and raise your shoulder blades off the floor. 4. Keep your head in line with your body, and do not press your chin to your chest.  5. Hold this position for 1 or 2 seconds, then slowly lower yourself back down to the floor. 6. Repeat 8 to 12 times. Pelvic tilt exercise    1. Lie on your back with your knees bent. 2. \"Brace\" your stomach. This means to tighten your muscles by pulling in and imagining your belly button moving toward your spine. You should feel like your back is pressing to the floor and your hips and pelvis are rocking back. 3. Hold for about 6 seconds while you breathe smoothly. 4. Repeat 8 to 12 times. Heel dig bridging    1. Lie on your back with both knees bent and your ankles bent so that only your heels are digging into the floor. Your knees should be bent about 90 degrees. 2. Then push your heels into the floor, squeeze your buttocks, and lift your hips off the floor until your shoulders, hips, and knees are all in a straight line. 3. Hold for about 6 seconds as you continue to breathe normally, and then slowly lower your hips back down to the floor and rest for up to 10 seconds. 4. Do 8 to 12 repetitions. Hamstring stretch in doorway    1. Lie on your back in a doorway, with one leg through the open door. 2. Slide your leg up the wall to straighten your knee. You should feel a gentle stretch down the back of your leg. 3. Hold the stretch for at least 15 to 30 seconds. Do not arch your back, point your toes, or bend either knee.  Keep one heel touching the floor and the other heel touching the wall. 4. Repeat with your other leg. 5. Do 2 to 4 times for each leg. Hip flexor stretch    1. Kneel on the floor with one knee bent and one leg behind you. Place your forward knee over your foot. Keep your other knee touching the floor. 2. Slowly push your hips forward until you feel a stretch in the upper thigh of your rear leg. 3. Hold the stretch for at least 15 to 30 seconds. Repeat with your other leg. 4. Do 2 to 4 times on each side. Wall sit    1. Stand with your back 10 to 12 inches away from a wall. 2. Lean into the wall until your back is flat against it. 3. Slowly slide down until your knees are slightly bent, pressing your lower back into the wall. 4. Hold for about 6 seconds, then slide back up the wall. 5. Repeat 8 to 12 times. Follow-up care is a key part of your treatment and safety. Be sure to make and go to all appointments, and call your doctor if you are having problems. It's also a good idea to know your test results and keep alist of the medicines you take. Where can you learn more? Go to https://ResponseTek.YOOWALK. org and sign in to your AllergEase account. Enter X731 in the Respi box to learn more about \"Low Back Pain: Exercises. \"     If you do not have an account, please click on the \"Sign Up Now\" link. Current as of: July 1, 2021               Content Version: 13.2  © 2006-2022 Healthwise, Incorporated. Care instructions adapted under license by [facility] ([facility]). If you have questions about a medical condition or this instruction, always ask your healthcare professional. [place] [address] any warranty or liability for your use of this information. Patient Education        Low Back Arthritis: Exercises  Introduction  Here are some examples of typical rehabilitation exercises for your condition. Start each exercise slowly. Ease off the exercise if you start to have pain.   Your doctor or physical therapist will tell you when you can start theseexercises and which ones will work best for you. When you are not being active, find a comfortable position for rest. Some people are comfortable on the floor or a medium-firm bed with a small pillow under their head and another under their knees. Some people prefer to lie on their side with a pillow between their knees. Don't stay in one position fortoo long. Take short walks (10 to 20 minutes) every 2 to 3 hours. Avoid slopes, hills, and stairs until you feel better. Walk only distances you can manage withoutpain, especially leg pain. How to do the exercises  Pelvic tilt    1. Lie on your back with your knees bent. 2. \"Brace\" your stomach--tighten your muscles by pulling in and imagining your belly button moving toward your spine. 3. Press your lower back into the floor. You should feel your hips and pelvis rock back. 4. Hold for 6 seconds while breathing smoothly. 5. Relax and allow your pelvis and hips to rock forward. 6. Repeat 8 to 12 times. Back stretches    1. Get down on your hands and knees on the floor. 2. Relax your head and allow it to droop. Round your back up toward the ceiling until you feel a nice stretch in your upper, middle, and lower back. Hold this stretch for as long as it feels comfortable, or about 15 to 30 seconds. 3. Return to the starting position with a flat back while you are on your hands and knees. 4. Let your back sway by pressing your stomach toward the floor. Lift your buttocks toward the ceiling. 5. Hold this position for 15 to 30 seconds. 6. Repeat 2 to 4 times. Follow-up care is a key part of your treatment and safety. Be sure to make and go to all appointments, and call your doctor if you are having problems. It's also a good idea to know your test results and keep alist of the medicines you take. Where can you learn more? Go to https://carlitos.Siminars. org and sign in to your BCM Solutionst account. Enter P332 in the [facility] box to learn more about \"Low Back Arthritis: Exercises. \"     If you do not have an account, please click on the \"Sign Up Now\" link. Current as of: July 1, 2021               Content Version: 13.2  © 2055-9705 Healthwise, Incorporated. Care instructions adapted under license by [facility] [address] If you have questions about a medical condition or this instruction, always ask your healthcare professional. [address] any warranty or liability for your use of this information.

## 2022-05-06 NOTE — LETTER
[address] [facility]  [address] [address]  Phone: [phone]  Fax: [phone]    DI Egan CNP        May 6, 2022     Patient: Nury Allen   YOB: 1960   Date of Visit: 5/6/2022       To Whom it May Concern:    Nury Allen was seen in my clinic on 5/6/2022. Please allow pilar to work light duty where he is not lifting anything heavier than 10lbs for the next 2-4 weeks due to medical conditions. If you have any questions or concerns, please don't hesitate to call.     Sincerely,   DI Egan CNP

## 2022-06-14 DIAGNOSIS — N52.9 ERECTILE DYSFUNCTION, UNSPECIFIED ERECTILE DYSFUNCTION TYPE: ICD-10-CM

## 2022-06-14 RX ORDER — SILDENAFIL 50 MG/1
TABLET, FILM COATED ORAL
Qty: 12 TABLET | Refills: 1 | Status: SHIPPED | OUTPATIENT
Start: 2022-06-14

## 2022-06-17 ENCOUNTER — OFFICE VISIT (OUTPATIENT)
Dept: FAMILY MEDICINE CLINIC | Age: 62
End: 2022-06-17
Payer: COMMERCIAL

## 2022-06-17 VITALS
HEART RATE: 78 BPM | OXYGEN SATURATION: 99 % | WEIGHT: 186 LBS | HEIGHT: 72 IN | DIASTOLIC BLOOD PRESSURE: 88 MMHG | BODY MASS INDEX: 25.19 KG/M2 | SYSTOLIC BLOOD PRESSURE: 138 MMHG

## 2022-06-17 DIAGNOSIS — E78.2 MIXED HYPERLIPIDEMIA: ICD-10-CM

## 2022-06-17 DIAGNOSIS — I10 ESSENTIAL HYPERTENSION: ICD-10-CM

## 2022-06-17 DIAGNOSIS — Z12.5 SCREENING FOR PROSTATE CANCER: ICD-10-CM

## 2022-06-17 DIAGNOSIS — E03.9 ACQUIRED HYPOTHYROIDISM: ICD-10-CM

## 2022-06-17 DIAGNOSIS — Z13.21 ENCOUNTER FOR VITAMIN DEFICIENCY SCREENING: ICD-10-CM

## 2022-06-17 DIAGNOSIS — Z00.00 ANNUAL PHYSICAL EXAM: Primary | ICD-10-CM

## 2022-06-17 DIAGNOSIS — R73.03 PREDIABETES: ICD-10-CM

## 2022-06-17 PROBLEM — M25.561 ACUTE PAIN OF RIGHT KNEE: Status: RESOLVED | Noted: 2020-07-09 | Resolved: 2022-06-17

## 2022-06-17 PROCEDURE — 99396 PREV VISIT EST AGE 40-64: CPT | Performed by: NURSE PRACTITIONER

## 2022-06-17 PROCEDURE — 36415 COLL VENOUS BLD VENIPUNCTURE: CPT | Performed by: NURSE PRACTITIONER

## 2022-06-17 ASSESSMENT — ENCOUNTER SYMPTOMS
WHEEZING: 0
COLOR CHANGE: 0
SORE THROAT: 0
EYE DISCHARGE: 0
GASTROINTESTINAL NEGATIVE: 1
EYE ITCHING: 0
SHORTNESS OF BREATH: 0
SINUS PRESSURE: 0
EYE REDNESS: 0
CHOKING: 0
RHINORRHEA: 0
CHEST TIGHTNESS: 0
RESPIRATORY NEGATIVE: 1
ALLERGIC/IMMUNOLOGIC NEGATIVE: 1
COUGH: 0
VOMITING: 0
EYE PAIN: 0
STRIDOR: 0
NAUSEA: 0
CONSTIPATION: 0
APNEA: 0
BACK PAIN: 0
ABDOMINAL PAIN: 0
DIARRHEA: 0
TROUBLE SWALLOWING: 0
PHOTOPHOBIA: 0
BLOOD IN STOOL: 0

## 2022-06-17 NOTE — PATIENT INSTRUCTIONS
Patient Education     Glucosamine Tumeric (Osteo Bi-Flex)      Recombinant Zoster (Shingles) Vaccine: What You Need to Know  Why get vaccinated? Recombinant zoster (shingles) vaccine can prevent shingles. Shingles (also called herpes zoster, or just zoster) is a painful skin rash, usually with blisters. In addition to the rash, shingles can cause fever, headache, chills, or upset stomach. More rarely, shingles can lead to pneumonia, hearingproblems, blindness, brain inflammation (encephalitis), or death. The most common complication of shingles is long-term nerve pain called postherpetic neuralgia (PHN). PHN occurs in the areas where the shingles rash was, even after the rash clears up. It can last for months or years after therash goes away. The pain from PHN can be severe and debilitating. About 10 to 18% of people who get shingles will experience PHN. The risk of PHN increases with age. An older adult with shingles is more likely to develop PHN and have longer lasting and more severe pain than a younger person withshingles. Shingles is caused by the varicella zoster virus, the same virus that causes chickenpox. After you have chickenpox, the virus stays in your body and can cause shingles later in life. Shingles cannot be passed from one person to another, but the virus that causes shingles can spread and cause chickenpox insomeone who had never had chickenpox or received chickenpox vaccine. Recombinant shingles vaccine  Recombinant shingles vaccine provides strong protection against shingles. Bypreventing shingles, recombinant shingles vaccine also protects against PHN. Recombinant shingles vaccine is the preferred vaccine for the prevention of shingles. However, a different vaccine, live shingles vaccine, may be used in somecircumstances. The recombinant shingles vaccine is recommended for adults 50 years and older without serious immune problems. It is given as a two-dose series.   This vaccine is also recommended for people who have already gotten another type of shingles vaccine, the live shingles vaccine. There is no live virus inthis vaccine. Shingles vaccine may be given at the same time as other vaccines. Talk with your health care provider  Tell your vaccine provider if the person getting the vaccine:   Has had an allergic reaction after a previous dose of recombinant shingles vaccine, or has any severe, life-threatening allergies.  Is pregnant or breastfeeding.  Is currently experiencing an episode of shingles. In some cases, your health care provider may decide to postpone shinglesvaccination to a future visit. People with minor illnesses, such as a cold, may be vaccinated. People who are moderately or severely ill should usually wait until they recover beforegetting recombinant shingles vaccine. Your health care provider can give you more information. Risks of a vaccine reaction   A sore arm with mild or moderate pain is very common after recombinant shingles vaccine, affecting about 80% of vaccinated people. Redness and swelling can also happen at the site of the injection.  Tiredness, muscle pain, headache, shivering, fever, stomach pain, and nausea happen after vaccination in more than half of people who receive recombinant shingles vaccine. In clinical trials, about 1 out of 6 people who got recombinant zoster vaccine experienced side effects that prevented them from doing regular activities. Symptoms usually went away on their own in 2 to 3 days. You should still get the second dose of recombinant zoster vaccine even if youhad one of these reactions after the first dose. People sometimes faint after medical procedures, including vaccination. Tellyour provider if you feel dizzy or have vision changes or ringing in the ears. As with any medicine, there is a very remote chance of a vaccine causing asevere allergic reaction, other serious injury, or death.   What if there is a serious problem? An allergic reaction could occur after the vaccinated person leaves the clinic. If you see signs of a severe allergic reaction (hives, swelling of the face and throat, difficulty breathing, a fast heartbeat, dizziness, or weakness), call 9-1-1 and get the person to the nearest hospital.  For other signs that concern you, call your health care provider. Adverse reactions should be reported to the Vaccine Adverse Event Reporting System (VAERS). Your health care provider will usually file this report, or you can do it yourself. Visit the VAERS website at www.vaers. [facility].gov or call 6-369.407.1269. VAERS is only for reporting reactions, and VAERS staff do not give medical advice. How can I learn more?  Ask your health care provider.  Call your local or state health department.  Contact the Centers for Disease Control and Prevention (CDC):  ? Call 8-806.283.2786 (1-800-CDC-INFO) or  ? Visit CDC's website at www.cdc.gov/vaccines  Vaccine Information Statement  Recombinant Zoster Vaccine  10/30/2019  [facility] and [facility] for Disease Control and Prevention  Many Vaccine Information Statements are available in [place] and other languages. See www.immunize.org/vis. Hojas de Información Sobre Vacunas están disponibles en Español y en muchos otros idiomas. Visite [email or web address]   Care instructions adapted under license by [facility] ([facility]). If you have questions about a medical condition or this instruction, always ask your healthcare professional. [place] [address] any warranty or liability for your use of this information.

## 2022-06-17 NOTE — PROGRESS NOTES
Chief Complaint:   Madison Guaman is a 63 y.o. male who presents for complete physical examination. HPI: Matt Coyne presents to the office today for a complete physical examination. He admits he is fixing up his house and planning to move to [place]. Patient is here today to follow up on hypertension. Taking medications as prescribed - HCTZ 25 mg daily and Lisinopril 40 mg daily. Is trying to adhere to a no salt diet. Denies any chest pain, leg swelling, orthopnea, dizziness. Patient is here to follow up on hypothyroidism. Taking medication as prescribed - [place] Thyroid 180 mg daily. Denies any palpitations, diarrhea, tremors, constipation, increased fatigue. Denies any side effect from the medication.       Wt Readings from Last 3 Encounters:   06/17/22 186 lb (84.4 kg)   05/06/22 185 lb (83.9 kg)   03/23/22 180 lb (81.6 kg)     BP Readings from Last 3 Encounters:   06/17/22 138/88   05/06/22 135/88   07/28/21 120/70     Patient Active Problem List   Diagnosis    Low testosterone    Capsulitis of foot    GERD (gastroesophageal reflux disease)    Erectile dysfunction    Elevated LDL cholesterol level    Essential hypertension    Prediabetes    Hyperlipidemia    Former smoker    Acute idiopathic gout of multiple sites    Acquired hypothyroidism    Degenerative tear of medial meniscus of right knee    Locked knee, right    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[facility] [address]    Chondromalacia of right patella    S/P hip replacement, right     Preventive Care:  Health Maintenance   Topic Date Due    COVID-19 Vaccine (1) Never done    Prostate Specific Antigen (PSA) Screening or Monitoring  02/14/2020    A1C test (Diabetic or Prediabetic)  03/29/2022    Pneumococcal 0-64 years Vaccine (1 - PCV) 06/13/2023 (Originally 11/25/1966)    Shingles vaccine (1 of 2) 06/17/2023 (Originally 11/25/2012)    Flu vaccine (Season Ended) 09/01/2022    Depression Screen  05/06/2023    Lipids  01/30/2025    Colorectal Cancer Screen 08/01/2026    DTaP/Tdap/Td vaccine (2 - Td or Tdap) 01/09/2028    Hepatitis C screen  Completed    HIV screen  Completed    Hepatitis A vaccine  Aged Out    Hepatitis B vaccine  Aged Out    Hib vaccine  Aged Out    Meningococcal (ACWY) vaccine  Aged Out      Prostate: no voiding symptoms  Last eye exam: More than 5 years ago  Exercise: no regular exercise  Sunscreen use: yes  Diet: Moderately healthy  Prostate Cancer Screening/risk vs. benefits of PSA evaluation discussed  Appropriate Diet  Exercise  Safety:   use of seat belts, use of home smoke detectors  Dentistry  Skin protection to avoid skin cancer  Last colonoscopy was 2016      Lipid panel:   Lab Results   Component Value Date    CHOL 254 (H) 01/30/2020    TRIG 248 (H) 01/30/2020    HDL 48 01/30/2020    LDLCALC 156 (H) 01/30/2020    LDLDIRECT 168 (H) 08/01/2016        Living will:  no,   Patient declines ACP discussion/assistance    Immunization History   Administered Date(s) Administered    Tdap (Boostrix, Adacel) 01/09/2018       Allergies   Allergen Reactions    Atorvastatin      Myalgias     Protonix [Pantoprazole Sodium] Hives     Outpatient Medications Marked as Taking for the 6/17/22 encounter (Office Visit) with DI Warren CNP   Medication Sig Dispense Refill    sildenafil (VIAGRA) 50 MG tablet TAKE 1 TABLET DAILY AS NEEDED FOR ERECTILE DYSFUNCTION 12 tablet 1    hydroCHLOROthiazide (HYDRODIURIL) 25 MG tablet Take 1 tablet by mouth every morning 30 tablet 1    cyclobenzaprine (FLEXERIL) 5 MG tablet Take 1-2 tablets by mouth nightly for muscle spasms 45 tablet 0    lisinopril (PRINIVIL;ZESTRIL) 40 MG tablet Take 1 tablet by mouth daily 90 tablet 0    busPIRone (BUSPAR) 5 MG tablet TAKE 1 TO 2 TABLETS UP TO THREE TIMES A DAY AS NEEDED FOR ANXIETY (NEED FOLLOW UP OFFICE VISIT FOR ADDITIONAL REFILLS) 180 tablet 1    famotidine (PEPCID) 40 MG tablet TAKE 1 TABLET EVERY EVENING 90 tablet 3    triamcinolone (KENALOG) 0.1 % ointment Apply to affected area sparingly two times a day for 5 to 7 days only 30 g 0    ARMOUR THYROID 180 MG tablet TAKE ONE TABLET BY MOUTH DAILY 60 tablet 0    ketoconazole (NIZORAL) 2 % cream Apply topically to affected area daily 30 g 0    Vitamins B1 B6 B12 (NEURO CATHERINE PO) Take by mouth      Testosterone (ANDROGEL PUMP) 20.25 MG/ACT (1.62%) GEL gel APPLY 2 PUMP ACTUATIONS (40.5 MG/2.5 GM) TOPICALLY ONCE DAILY IN THE MORNING TO THE SHOULDERS AND UPPER ARMS 75 g 2    Cholecalciferol (VITAMIN D3) 3000 UNITS TABS Take  by mouth.  magnesium 30 MG tablet Take 250 mg by mouth 2 times daily.          Past Medical History:   Diagnosis Date    Degenerative tear of medial meniscus of right knee 7/9/2020    Erectile dysfunction     GERD (gastroesophageal reflux disease)     Hypertension     Hypertriglyceridemia 1/10/2018    Hypothyroidism     Low testosterone     Recurrent kidney stones     Tobacco abuse 1/10/2018     Past Surgical History:   Procedure Laterality Date    APPENDECTOMY      FINGER SURGERY Left     left index finger     HERNIA REPAIR      bilateral    KNEE ARTHROSCOPY Right 8/28/2020    RIGHT KNEE VIDEO ARTHROSCOPY, LIMITED SYNOVECTOMY FOR HOFFA'S PAD, performed by Barbara Christopher MD at [facility] [address] Right 4/2/2021    RIGHT TOTAL HIP ARTHROPLASTY, DIRECT ANTERIOR APPROACH performed by Jocelyne Iqbal MD at [address]       Family History   Problem Relation Age of Onset    Cancer Mother 79        breast     Arthritis Mother     High Blood Pressure Father     Stroke Father     Heart Attack Father 68    Heart Disease Father     High Cholesterol Father     High Blood Pressure Brother     Other Brother 27        Testicular Cancer     Other Sister         Hypothyrodism    Arthritis Sister     Diabetes Son     Other Son         Hypothyroidism     Social History     Socioeconomic History    Marital status:  Spouse name: Not on file    Number of children: Not on file    Years of education: Not on file    Highest education level: Not on file   Occupational History    Occupation: manufacturing       Comment: [employer]   Tobacco Use    Smoking status: Current Some Day Smoker     Packs/day: 0.00     Years: 15.00     Pack years: 0.00     Types: Cigars    Smokeless tobacco: Never Used    Tobacco comment: occassional cigar   Vaping Use    Vaping Use: Never used   Substance and Sexual Activity    Alcohol use: Yes     Comment: weekly (1 drink of bourbon in a sitting)     Drug use: No    Sexual activity: Yes     Partners: Male   Other Topics Concern    Not on file   Social History Narrative    Not on file     Social Determinants of Health     Financial Resource Strain:     Difficulty of Paying Living Expenses: Not on file   Food Insecurity:     Worried About Running Out of Food in the Last Year: Not on file    Dave of Food in the Last Year: Not on file   Transportation Needs:     Lack of Transportation (Medical): Not on file    Lack of Transportation (Non-Medical):  Not on file   Physical Activity:     Days of Exercise per Week: Not on file    Minutes of Exercise per Session: Not on file   Stress:     Feeling of Stress : Not on file   Social Connections:     Frequency of Communication with Friends and Family: Not on file    Frequency of Social Gatherings with Friends and Family: Not on file    Attends Gnosticist Services: Not on file    Active Member of Clubs or Organizations: Not on file    Attends Club or Organization Meetings: Not on file    Marital Status: Not on file   Intimate Partner Violence:     Fear of Current or Ex-Partner: Not on file    Emotionally Abused: Not on file    Physically Abused: Not on file    Sexually Abused: Not on file   Housing Stability:     Unable to Pay for Housing in the Last Year: Not on file    Number of [place] in the Last Year: Not on file    Unstable Housing in the Last Year: Not on file       Review of Systems:  Review of Systems   Constitutional: Negative. Negative for activity change, appetite change, chills, diaphoresis, fatigue, fever and unexpected weight change. HENT: Negative. Negative for ear pain, rhinorrhea, sinus pressure, sneezing, sore throat and trouble swallowing. Eyes: Negative for photophobia, pain, discharge, redness, itching and visual disturbance. Respiratory: Negative. Negative for apnea, cough, choking, chest tightness, shortness of breath, wheezing and stridor. Cardiovascular: Negative for chest pain, palpitations and leg swelling. Gastrointestinal: Negative. Negative for abdominal pain, blood in stool, constipation, diarrhea, nausea and vomiting. Genitourinary: Negative. Negative for decreased urine volume, difficulty urinating, dysuria, enuresis, flank pain, frequency, genital sores, hematuria and urgency. Musculoskeletal: Negative. Negative for arthralgias, back pain, gait problem, joint swelling, myalgias, neck pain and neck stiffness. Skin: Negative. Negative for color change, pallor, rash and wound. Allergic/Immunologic: Negative. Neurological: Negative. Negative for dizziness, facial asymmetry, weakness, light-headedness and headaches. Psychiatric/Behavioral: Negative for agitation, behavioral problems, confusion, decreased concentration, dysphoric mood, hallucinations, self-injury, sleep disturbance and suicidal ideas. The patient is not nervous/anxious and is not hyperactive. Physical Exam:   Vitals:    06/17/22 0858 06/17/22 0900 06/17/22 0947 06/17/22 0958   BP: (!) 157/102 (!) 147/98 (!) 142/70 138/88   Site: Right Upper Arm Right Upper Arm     Position: Sitting Sitting     Cuff Size: Medium Adult Medium Adult     Pulse: 85 78     SpO2: 99%      Weight: 186 lb (84.4 kg)      Height: 6' (1.829 m)        Body mass index is 25.23 kg/m².      Physical Exam  Vitals and nursing note reviewed. Constitutional:       General: He is not in acute distress. Appearance: Normal appearance. He is well-developed. He is not diaphoretic. HENT:      Head: Normocephalic and atraumatic. Right Ear: Tympanic membrane, ear canal and external ear normal. There is no impacted cerumen. Left Ear: Tympanic membrane, ear canal and external ear normal. There is no impacted cerumen. Nose: Nose normal. No congestion or rhinorrhea. Mouth/Throat:      Mouth: Mucous membranes are moist.      Pharynx: Oropharynx is clear. No oropharyngeal exudate or posterior oropharyngeal erythema. Eyes:      General: No scleral icterus. Right eye: No discharge. Left eye: No discharge. Extraocular Movements: Extraocular movements intact. Conjunctiva/sclera: Conjunctivae normal.      Pupils: Pupils are equal, round, and reactive to light. Neck:      Vascular: No carotid bruit. Trachea: No tracheal deviation. Cardiovascular:      Rate and Rhythm: Normal rate and regular rhythm. Pulses: Normal pulses. Heart sounds: Normal heart sounds. No murmur heard. No friction rub. No gallop. Pulmonary:      Effort: Pulmonary effort is normal. No respiratory distress. Breath sounds: Normal breath sounds. No stridor. No wheezing, rhonchi or rales. Chest:      Chest wall: No tenderness. Abdominal:      General: Bowel sounds are normal. There is no distension. Palpations: Abdomen is soft. There is no mass. Tenderness: There is no abdominal tenderness. There is no guarding or rebound. Hernia: No hernia is present. Musculoskeletal:         General: No swelling, tenderness, deformity or signs of injury. Normal range of motion. Cervical back: Normal range of motion and neck supple. No rigidity. No muscular tenderness. Right lower leg: No edema. Left lower leg: No edema. Lymphadenopathy:      Cervical: No cervical adenopathy.    Skin: General: Skin is warm and dry. Capillary Refill: Capillary refill takes less than 2 seconds. Coloration: Skin is not jaundiced or pale. Findings: No bruising, erythema, lesion or rash. Neurological:      General: No focal deficit present. Mental Status: He is alert and oriented to person, place, and time. Mental status is at baseline. Cranial Nerves: No cranial nerve deficit. Sensory: No sensory deficit. Motor: No weakness or abnormal muscle tone. Coordination: Coordination normal.      Gait: Gait normal.      Deep Tendon Reflexes: Reflexes are normal and symmetric. Reflexes normal.   Psychiatric:         Mood and Affect: Mood normal.         Behavior: Behavior normal.         Thought Content: Thought content normal.         Judgment: Judgment normal.           Plan:    Luis Yu was seen today for annual exam and hypertension. BP borderline elevated. He is going to check blood pressure at home and send Playfire message with readings. If BP continues to be elevated, will consider increase HCTZ from 12.5 mg daily to 25 mg daily. Diagnoses and all orders for this visit:    Annual physical exam    Essential hypertension  -     CBC with Auto Differential  -     Comprehensive Metabolic Panel    Acquired hypothyroidism  -     TSH with Reflex    Prediabetes  -     Hemoglobin A1C    Mixed hyperlipidemia  -     Lipid Panel    Screening for prostate cancer  -     PSA Screening    Encounter for vitamin deficiency screening  -     Vitamin D 25 Hydroxy  -     Vitamin B12 & Folate          Education:     Discussed using sunscreen for prevention of skin cancer and toreapply every 2 hours and after swimming, bathing, sweating. The nature of sun-induced photo-aging and skin cancers is discussed. Sun avoidance, protective clothing, and the use of 30-SPF sunscreens isadvised. Observe closely for skin damage/changes, and call if such occurs.     Discussed safe sex practices to prevent STI, HIV, and unplanned pregnancy     Discussed healthy eating habits and toexercise 5 days a week to elevate heart rate for 30-45 minutes at a time     Eating information given to patient in office and in AVS:   Eating healthy foods can help lower your risk for disease. Healthy food gives you energy and keeps your heart strong, your brain active,your muscles working, and your bones strong. A healthy diet includes a variety of foods from the basic food groups: grains, vegetables, fruits, milk and milk products, and meat and beans. Some people may eat more oftheir favorite foods from only one food group and, as a result, miss getting the nutrients they need. So, it is important to pay attention not only to what you eat but also to what you are missing from your diet. You can eata healthy, balanced diet by making a few small changes. Follow-up care is a key part of your treatment and safety. Be sure to make and go to allappointments, and call your doctor if you are having problems. It's also a good idea to know your test results and keep a list of the medicines you take. How canyou care for yourself at home? Look at what you eat  · Keep a food diary for a week or two andrecord everything you eat or drink. Track the number of servings you eat from each food group. · For a balanced diet every day, eat a variety of:  ¨ 6 or more ounce-equivalents of grains, such as cereals, breads, crackers, rice, or pasta, every day. An ounce-equivalent is 1 slice ofbread, 1 cup of ready-to-eat cereal, or ½ cup of cooked rice, cooked pasta, or cooked cereal.  ¨ 2½ cups of vegetables, especially:  § Dark-green vegetables such as broccoli and spinach. § Orange vegetables such as carrots and sweet potatoes. § Dry beans (such as wilson and kidney beans) and peas (such as lentils). ¨ 2 cups of fresh, frozen, or canned fruit. A small apple or 1 banana or orange equals 1 cup.   ¨ 3 cups of nonfat or low-fat milk, yogurt, or other milk products. ¨ 5½ ounces of meat and beans, such as chicken, fish, lean meat, beans, nuts, and seeds. One egg, 1 tablespoon of peanut butter, ½ ounce nuts or seeds, or ¼ cup of cooked beansequals 1 ounce of meat. · Learn how to read food labels for serving sizes and ingredients. Fast-foodand convenience-food meals often contain few or no fruits or vegetables. Make sure you eat some fruits and vegetables to make the meal more nutritious. · Look at your food diary. Foreach food group, add up what you have eaten and then divide the total by the number of days. This will give you an idea of how much you are eating from each food group. See if you can find some ways to change your diet tomake it more healthy. Start small  · Do not try to make dramatic changes to your diet all at once. You might feel that you are missing out on your favorite foods and then be more likely to fail. · Start slowly, and gradually change your habits. Try some of the following:  ¨ Use wholewheat bread instead of white bread. ¨ Use nonfat or low-fat milk instead of whole milk. ¨ Eat brown rice instead of white rice, and eat wholewheat pasta instead of white-flour pasta. ¨ Try low-fat cheeses and low-fat yogurt. ¨ Add more fruits and vegetables to meals and have them forsnacks. ¨ Add lettuce, tomato, cucumber, and onion to sandwiches. ¨ Add fruit to yogurt and cereal.  Enjoy food  · You can still eat yourfavorite foods. You just may need to eat less of them. If your favorite foods are high in fat, salt, and sugar, limit how often you eat them, but do not cut them out entirely. · Eat awide variety of foods. Make healthy choices when eating out  · The type of restaurant you choose can help you make healthy choices. Even fast-food chains are now offering more low-fat or healthier choices on the menu. · Choose smaller portions, or take half of your meal home.   · When eating out, try:  ¨ A veggie pizza with a whole wheat crust or grilled chicken (instead of sausage or pepperoni). ¨ Pasta with roastedvegetables, grilled chicken, or marinara sauce instead of cream sauce. ¨ A vegetable wrap or grilled chicken wrap. ¨ Broiled or poached foodinstead of fried or breaded items. Make healthy choices easy  · Buy packaged, prewashed, ready-to-eat fresh vegetables and fruits, such as baby carrots, salad mixes, and chopped or shredded broccoli and cauliflower. · Buy packaged, presliced fruits, such as melon or pineapple. · Choose 100% fruit or vegetable juice instead of soda. Limit juice intake to 4 to 6oz (½ to ¾ cup) a day. Education given to patient in office and in AVS onPhysical Activity:     The types of physical activity that can help you get fit and stay healthy include:  · Aerobic or \"cardio\" activities that make your heart beat faster and make you breathe harder, such as brisk walking, riding a bike, orrunning. Aerobic activities strengthen your heart and lungs and build up your endurance. · Strength training activities that make your muscles work against, or \"resist,\" something, such as lifting weights or doing push-ups. These activities help tone and strengthen your muscles. · Stretches that allow you to move your joints and muscles through their full range of motion. Stretching helps you be more flexible and avoid injury. What are the benefits of physical activity? Being active is one of the best things you can do to get fit andstay healthy. It helps you to:  · Feel stronger and have more energy to do all the things you like todo. · Focus better at school or work and perform better in sports. · Feel, think, and sleep better. · Reach and stay at a healthy weight. · Lose fat and build lean muscle. · Lower your risk for serious health problems. · Keep your bones, muscles, and joints strong. Being fit lets you do more physical activity. And it lets you work out harder without as mucheffort.   How can you make physical activity part of your life? Get at least 30 minutes of exercise on most days of the week. Walking is a goodchoice. You also may want to do other activities, such as running, swimming, cycling, or playing tennis or team sports. Pick activities that you likeones that make your heart beatfaster, your muscles stronger, and your muscles and joints more flexible. If you find more than one thing you like doing, do them all. You don't have to do the same thing every day. Get your heart pumping every day. Any activity that makes your heart beat faster and keeps it at that rate for a while counts. Here are some great ways to get your heart beating faster:  · Go for a brisk walk, run, or bike ride. · Go for a hike orswim. · Go in-line skating. · Play a game of touch football, basketball, or soccer. · Ride a bike. · Play tennis or racquetball. · Climb stairs. Even some household chores can be aerobicjust do them at a faster pace. Vacuuming, raking or mowing the lawn, sweeping the garage, and washing and waxing the car all can help get your heart rate up. Strengthen your muscles during the week. You don't have to lift heavy weights or grow big, bulky muscles to get stronger. Doing a few simple activities that make yourmuscles work against, or \"resist,\" something can help you get stronger. For example, you can:  · Dopush-ups or sit-ups, which use your own body weight as resistance. · Lift weights or dumbbells or use stretch bands at home or in a gym or community center. Stretch your muscles often. Stretching will help you as you become more active. It can help you stay flexible, loosen tightmuscles, and avoid injury. It can also help improve your balance and posture and can be a great way to relax. Be sure to stretch the muscles you'll be using when you work out. It's best to warm your muscles slightlybefore you stretch them.  Walk or do some other light aerobic activity for a few minutes, and then start stretching. When you stretch your muscles:  · Do it slowly. Stretching is not about going fast or making sudden movements. · Don't push or bounce during a stretch. · Hold each stretch for at least 15 to 30 seconds, if you can. You should feel a stretch in the muscle, but not pain. · Breathe out as you do the stretch. Then breathe in as you hold the stretch. Don't hold your breath. If you're worried about how more activity might affect your health, have a checkup before youstart. Follow any special advice your doctor gives you for getting a smart start.

## 2022-06-18 LAB
A/G RATIO: 1.6 (ref 1.1–2.2)
ALBUMIN SERPL-MCNC: 4.8 G/DL (ref 3.4–5)
ALP BLD-CCNC: 187 U/L (ref 40–129)
ALT SERPL-CCNC: 72 U/L (ref 10–40)
ANION GAP SERPL CALCULATED.3IONS-SCNC: 19 MMOL/L (ref 3–16)
AST SERPL-CCNC: 62 U/L (ref 15–37)
BASOPHILS ABSOLUTE: 0 K/UL (ref 0–0.2)
BASOPHILS RELATIVE PERCENT: 0.8 %
BILIRUB SERPL-MCNC: 0.9 MG/DL (ref 0–1)
BUN BLDV-MCNC: 14 MG/DL (ref 7–20)
CALCIUM SERPL-MCNC: 10 MG/DL (ref 8.3–10.6)
CHLORIDE BLD-SCNC: 96 MMOL/L (ref 99–110)
CHOLESTEROL, TOTAL: 257 MG/DL (ref 0–199)
CO2: 22 MMOL/L (ref 21–32)
CREAT SERPL-MCNC: 0.9 MG/DL (ref 0.9–1.3)
EOSINOPHILS ABSOLUTE: 0.1 K/UL (ref 0–0.6)
EOSINOPHILS RELATIVE PERCENT: 2.5 %
ESTIMATED AVERAGE GLUCOSE: 108.3 MG/DL
FOLATE: 11.97 NG/ML (ref 4.78–24.2)
GFR AFRICAN AMERICAN: >60
GFR NON-AFRICAN AMERICAN: >60
GLUCOSE BLD-MCNC: 85 MG/DL (ref 70–99)
HBA1C MFR BLD: 5.4 %
HCT VFR BLD CALC: 46.6 % (ref 40.5–52.5)
HDLC SERPL-MCNC: 95 MG/DL (ref 40–60)
HEMOGLOBIN: 16.1 G/DL (ref 13.5–17.5)
LDL CHOLESTEROL CALCULATED: 149 MG/DL
LYMPHOCYTES ABSOLUTE: 1.6 K/UL (ref 1–5.1)
LYMPHOCYTES RELATIVE PERCENT: 38.9 %
MCH RBC QN AUTO: 34.4 PG (ref 26–34)
MCHC RBC AUTO-ENTMCNC: 34.5 G/DL (ref 31–36)
MCV RBC AUTO: 99.9 FL (ref 80–100)
MONOCYTES ABSOLUTE: 0.3 K/UL (ref 0–1.3)
MONOCYTES RELATIVE PERCENT: 8.7 %
NEUTROPHILS ABSOLUTE: 2 K/UL (ref 1.7–7.7)
NEUTROPHILS RELATIVE PERCENT: 49.1 %
PDW BLD-RTO: 13.1 % (ref 12.4–15.4)
PLATELET # BLD: 221 K/UL (ref 135–450)
PMV BLD AUTO: 9 FL (ref 5–10.5)
POTASSIUM SERPL-SCNC: 4.7 MMOL/L (ref 3.5–5.1)
PROSTATE SPECIFIC ANTIGEN: 1.01 NG/ML (ref 0–4)
RBC # BLD: 4.67 M/UL (ref 4.2–5.9)
SODIUM BLD-SCNC: 137 MMOL/L (ref 136–145)
T3 TOTAL: 1.59 NG/ML (ref 0.8–2)
T4 FREE: 1.2 NG/DL (ref 0.9–1.8)
TOTAL PROTEIN: 7.8 G/DL (ref 6.4–8.2)
TRIGL SERPL-MCNC: 64 MG/DL (ref 0–150)
TSH REFLEX: 0.21 UIU/ML (ref 0.27–4.2)
VITAMIN B-12: 468 PG/ML (ref 211–911)
VITAMIN D 25-HYDROXY: 59.2 NG/ML
VLDLC SERPL CALC-MCNC: 13 MG/DL
WBC # BLD: 4 K/UL (ref 4–11)

## 2022-06-20 DIAGNOSIS — R74.8 ELEVATED ALKALINE PHOSPHATASE LEVEL: Primary | ICD-10-CM

## 2022-06-21 DIAGNOSIS — R74.8 ELEVATED ALKALINE PHOSPHATASE LEVEL: ICD-10-CM

## 2022-06-21 LAB — GAMMA GLUTAMYL TRANSFERASE: 555 U/L (ref 8–61)

## 2022-07-06 ENCOUNTER — TELEPHONE (OUTPATIENT)
Dept: FAMILY MEDICINE CLINIC | Age: 62
End: 2022-07-06

## 2022-07-06 DIAGNOSIS — R79.89 ELEVATED LFTS: ICD-10-CM

## 2022-07-06 DIAGNOSIS — R74.8 ELEVATED SERUM GGT LEVEL: Primary | ICD-10-CM

## 2022-07-06 PROBLEM — Z53.20 REFUSAL OF STATIN MEDICATION BY PATIENT: Status: ACTIVE | Noted: 2022-07-06

## 2022-07-06 NOTE — TELEPHONE ENCOUNTER
----- Message from DI Barajas CNP sent at 6/19/2022  4:07 PM EDT -----  Please add GGT for elevated alkaline phosphatase. May call patient with results while waiting or GGT results. Please schedule patient for nurse visit and order labs as requested below. Patient requesting to be called in the AM as he works third shift. A1C shows no DM or pre-DM. T3 WNL. T4 WNL. TSH is slightly elevated, but with T3 and T4 being WNL, would recommend continuing with his dose of [place] Thyroid since he feels best around this level. PSA (prostate) WNL. Vitamin D WNL. Liver enzymes elevated. This can be from eating too many sweets and starches from a fatty liver. Recommend drinking no more than 1-2 alcoholic beverages a day and decreasing concentrated sweets and starches. Recommend rechecking CMP and blood pressure in 4 weeks. Please schedule nurse visit. Cholesterol is elevated. Recommend routine exercise that elevates heart rate at least 5 times a week for at least 30-45 minutes, consuming a diet that emphasizes vegetables, fruits, whole grains, low-fat diary, poultry, fish, legumes, nuts and limited in sweets, sugar-sweetened beverages, and red meat. Based on ASCVD risk score, recommend starting a statin (Atorvastatin 40 mg daily); however, patient has declined in the past.  If he would like to try Rx for his ASCVD risk score, then please route back to provider. The ASCVD (atherosclerotic cardiovascular disease) risk score is a national guideline developed by the [employer] of [employer]. It is a calculation of your 10-year risk of having a cardiovascular problem, such as a heart attack or stroke. It is best to take the medication at night as the liver makes cholesterol at night. Recommend recheck of lipid, CK, CMP in 6 weeks if starting a statin.   Patient will need to be fasting for this blood work to be performed - may have black coffee, water, diet soda prior to blood

## 2022-07-06 NOTE — TELEPHONE ENCOUNTER
----- Message from DI Ontiveros CNP sent at 6/21/2022  4:19 PM EDT -----  GGT is significantly elevated. This can be a sign of a potential concern in the abdomen causing an elevated GGT. Recommend lipase to be drawn along with US of the abdomen. Please place order and give patient number to schedule.

## 2022-08-01 RX ORDER — LISINOPRIL 40 MG/1
TABLET ORAL
Qty: 90 TABLET | Refills: 1 | Status: SHIPPED | OUTPATIENT
Start: 2022-08-01

## 2022-09-02 DIAGNOSIS — K21.9 GASTROESOPHAGEAL REFLUX DISEASE WITHOUT ESOPHAGITIS: ICD-10-CM

## 2022-09-02 RX ORDER — FAMOTIDINE 40 MG/1
TABLET, FILM COATED ORAL
Qty: 90 TABLET | Refills: 0 | Status: SHIPPED | OUTPATIENT
Start: 2022-09-02

## 2022-10-19 DIAGNOSIS — I10 ESSENTIAL HYPERTENSION: ICD-10-CM

## 2022-10-19 RX ORDER — HYDROCHLOROTHIAZIDE 25 MG/1
25 TABLET ORAL EVERY MORNING
Qty: 90 TABLET | Refills: 0 | Status: SHIPPED | OUTPATIENT
Start: 2022-10-19

## 2022-10-19 NOTE — TELEPHONE ENCOUNTER
Last appt 6/17/2022, no appt scheduled  Pt due for \"Return in about 6 months (around 12/17/2022) for [facility] - 40 min appt\"   Routing to Dr. Chilo Jamison due to PCP out of office

## 2024-05-13 NOTE — PROGRESS NOTES
Immunization History   Administered Date(s) Administered    Tdap (Boostrix, Adacel) 01/09/2018       Allergies   Allergen Reactions    Atorvastatin      Myalgias     Protonix [Pantoprazole Sodium] Hives     Outpatient Medications Marked as Taking for the 1/30/20 encounter (Office Visit) with DI Siu - CNP   Medication Sig Dispense Refill    hydrochlorothiazide (HYDRODIURIL) 25 MG tablet Take 1 tablet by mouth every morning 90 tablet 1    predniSONE (DELTASONE) 10 MG tablet Take 40 mg for 3 days then 30 mg for 3 days then 20 mg for 3 days then 10 mg for 3 days 30 tablet 0    sildenafil (VIAGRA) 50 MG tablet Take 1 tablet by mouth as needed for Erectile Dysfunction 10 tablet 0    lisinopril (PRINIVIL;ZESTRIL) 10 MG tablet Take 1 tablet by mouth daily 90 tablet 0    ARMOUR THYROID 120 MG tablet TAKE 1 TABLET DAILY 90 tablet 4    Testosterone (ANDROGEL PUMP) 20.25 MG/ACT (1.62%) GEL gel APPLY 2 PUMP ACTUATIONS (40.5 MG/2.5 GM) TOPICALLY ONCE DAILY IN THE MORNING TO THE SHOULDERS AND UPPER ARMS 75 g 2    NEXIUM 20 MG capsule TAKE ONE CAPSULE BY MOUTH DAILY 30 capsule 0    Cholecalciferol (VITAMIN D3) 3000 UNITS TABS Take  by mouth.  magnesium 30 MG tablet Take 250 mg by mouth 2 times daily.          Past Medical History:   Diagnosis Date    Erectile dysfunction     GERD (gastroesophageal reflux disease)     Hypertension     Hypertriglyceridemia 1/10/2018    Hypothyroidism     Low testosterone     Recurrent kidney stones     Tobacco abuse 1/10/2018     Past Surgical History:   Procedure Laterality Date    APPENDECTOMY      FINGER SURGERY      HERNIA REPAIR      bilateral    LITHOTRIPSY      VASECTOMY       Family History   Problem Relation Age of Onset    Cancer Mother 79        breast     Arthritis Mother     High Blood Pressure Father     Stroke Father     Heart Attack Father 68    Heart Disease Father     High Cholesterol Father     High Blood Pressure Brother change. HENT: Negative. Negative for ear pain, rhinorrhea, sinus pressure, sneezing, sore throat and trouble swallowing. Eyes: Negative for photophobia, pain, discharge, redness, itching and visual disturbance. Respiratory: Negative. Negative for apnea, cough, choking, chest tightness, shortness of breath, wheezing and stridor. Cardiovascular: Positive for leg swelling. Negative for chest pain and palpitations. Gastrointestinal: Negative. Negative for abdominal pain, blood in stool, constipation, diarrhea, nausea and vomiting. Genitourinary: Negative. Negative for decreased urine volume, difficulty urinating, dysuria, enuresis, flank pain, frequency, genital sores, hematuria and urgency. Musculoskeletal: Positive for gait problem and myalgias. Negative for arthralgias, back pain, joint swelling, neck pain and neck stiffness. Skin: Negative. Negative for color change, pallor, rash and wound. Allergic/Immunologic: Negative. Neurological: Negative for dizziness, facial asymmetry, weakness, light-headedness and headaches. Psychiatric/Behavioral: Negative for agitation, behavioral problems, confusion, decreased concentration, dysphoric mood, hallucinations, self-injury, sleep disturbance and suicidal ideas. The patient is not nervous/anxious and is not hyperactive. Physical Exam:   Vitals:    01/30/20 1024 01/30/20 1028   BP: (!) 128/98 (!) 138/96   Site: Right Upper Arm Right Upper Arm   Position: Sitting Sitting   Cuff Size: Medium Adult Medium Adult   Pulse: 74 72   SpO2: 98%    Weight: 192 lb (87.1 kg)    Height: 5' 10\" (1.778 m)      Body mass index is 27.55 kg/m². Physical Exam  Vitals signs and nursing note reviewed. Constitutional:       General: He is not in acute distress. Appearance: Normal appearance. He is well-developed. He is not diaphoretic. HENT:      Head: Normocephalic and atraumatic.       Right Ear: Tympanic membrane, ear canal and external ear normal. cooked cereal.  ¨ 2½ cups of vegetables, especially:  § Dark-green vegetables such as broccoli and spinach. § Orange vegetables such as carrots and sweet potatoes. § Dry beans (such as wilson and kidney beans) and peas (such as lentils). ¨ 2 cups of fresh, frozen, or canned fruit. A small apple or 1 banana or orange equals 1 cup. ¨ 3 cups of nonfat or low-fat milk, yogurt, or other milk products. ¨ 5½ ounces of meat and beans, such as chicken, fish, lean meat, beans, nuts, and seeds. One egg, 1 tablespoon of peanut butter, ½ ounce nuts or seeds, or ¼ cup of cooked beansequals 1 ounce of meat. · Learn how to read food labels for serving sizes and ingredients. Fast-foodand convenience-food meals often contain few or no fruits or vegetables. Make sure you eat some fruits and vegetables to make the meal more nutritious. · Look at your food diary. Foreach food group, add up what you have eaten and then divide the total by the number of days. This will give you an idea of how much you are eating from each food group. See if you can find some ways to change your diet tomake it more healthy. Start small  · Do not try to make dramatic changes to your diet all at once. You might feel that you are missing out on your favorite foods and then be more likely to fail. · Start slowly, and gradually change your habits. Try some of the following:  ¨ Use wholewheat bread instead of white bread. ¨ Use nonfat or low-fat milk instead of whole milk. ¨ Eat brown rice instead of white rice, and eat wholewheat pasta instead of white-flour pasta. ¨ Try low-fat cheeses and low-fat yogurt. ¨ Add more fruits and vegetables to meals and have them forsnacks. ¨ Add lettuce, tomato, cucumber, and onion to sandwiches. ¨ Add fruit to yogurt and cereal.  Enjoy food  · You can still eat yourfavorite foods. You just may need to eat less of them.  If your favorite foods are high in fat, salt, and sugar, limit how often you eat them, but do more active. It can help you stay flexible, loosen tightmuscles, and avoid injury. It can also help improve your balance and posture and can be a great way to relax. Be sure to stretch the muscles you'll be using when you work out. It's best to warm your muscles slightlybefore you stretch them. Walk or do some other light aerobic activity for a few minutes, and then start stretching. When you stretch your muscles:  · Do it slowly. Stretching is not about going fast or making sudden movements. · Don't push or bounce during a stretch. · Hold each stretch for at least 15 to 30 seconds, if you can. You should feel a stretch in the muscle, but not pain. · Breathe out as you do the stretch. Then breathe in as you hold the stretch. Don't hold your breath. If you're worried about how more activity might affect your health, have a checkup before youstart. Follow any special advice your doctor gives you for getting a smart start. Statement Selected

## (undated) DEVICE — JEWISH HOSPITAL TURNOVER KIT: Brand: MEDLINE INDUSTRIES, INC.

## (undated) DEVICE — MARKER SURG SKIN UTIL BLK REG TIP NONSMEARING W/ 6IN RUL

## (undated) DEVICE — STERILE POLYISOPRENE POWDER-FREE SURGICAL GLOVES WITH EMOLLIENT COATING: Brand: PROTEXIS

## (undated) DEVICE — PLATE ES AD W 9FT CRD 2

## (undated) DEVICE — NEEDLE SPNL L3.5IN PNK HUB S STL REG WALL FIT STYL W/ QNCKE

## (undated) DEVICE — DRAPE C ARM UNIV W41XL74IN CLR PLAS XR VELC CLSR POLY STRP

## (undated) DEVICE — SUTURE STRATAFIX SPRL SZ 1 L14IN ABSRB VLT L48CM CTX 1/2 SXPD2B405

## (undated) DEVICE — COAXIAL HIGH FLOW TIP WITH SOFT SHIELD

## (undated) DEVICE — SUTURE VCRL SZ 2-0 L18IN ABSRB UD CT-1 L36MM 1/2 CIR J839D

## (undated) DEVICE — SUTURE VCRL SZ 1 L18IN ABSRB UD L36MM CT-1 1/2 CIR J841D

## (undated) DEVICE — COVER LT HNDL BLU PLAS

## (undated) DEVICE — PEEL-AWAY HOOD: Brand: FLYTE, SURGICOOL

## (undated) DEVICE — SPONGE,LAP,18"X18",DLX,XR,ST,5/PK,40/PK: Brand: MEDLINE

## (undated) DEVICE — HANDPIECE SUCTION TUBING INTERPULSE 10FT

## (undated) DEVICE — SYRINGE MED 30ML STD CLR PLAS LUERLOCK TIP N CTRL DISP

## (undated) DEVICE — SOLUTION SURG PREP 26 CC PURPREP

## (undated) DEVICE — PROTECTOR ULN NRV PUR FOAM HK LOOP STRP ANATOMICALLY

## (undated) DEVICE — 450 ML BOTTLE OF 0.05% CHLORHEXIDINE GLUCONATE IN 99.95% STERILE WATER FOR IRRIGATION, USP AND APPLICATOR.: Brand: IRRISEPT ANTIMICROBIAL WOUND LAVAGE

## (undated) DEVICE — 6619 2 PTNT ISO SYS INCISE AREA&LT;(&GT;&&LT;)&GT;P: Brand: STERI-DRAPE™ IOBAN™ 2

## (undated) DEVICE — PACK PROCEDURE SURG SURGERYARTHROSCOPY KNEE

## (undated) DEVICE — PAD DRY FLOOR ABS 32X58IN GRN

## (undated) DEVICE — BANDAGE COBAN 4 IN COMPR W4INXL5YD FOAM COHESIVE QUIK STK SELF ADH SFT

## (undated) DEVICE — Z INACTIVE NO SUPPLIER SOLUTIONIRRIG 3000ML 0.9% SOD CHL FLX CONT [79720808] [HOSPIRA WORLDWIDE INC]

## (undated) DEVICE — INTENDED FOR TISSUE SEPARATION, AND OTHER PROCEDURES THAT REQUIRE A SHARP SURGICAL BLADE TO PUNCTURE OR CUT.: Brand: BARD-PARKER ® CARBON RIB-BACK BLADES

## (undated) DEVICE — HOLDER SCALP PLAS G STD

## (undated) DEVICE — 3M™ TEGADERM™ TRANSPARENT FILM DRESSING FRAME STYLE, 1627, 4 IN X 10 IN (10 CM X 25 CM), 20/CT 4CT/CASE: Brand: 3M™ TEGADERM™

## (undated) DEVICE — ELECTROSURGICAL PENCIL ROCKER SWITCH NON COATED BLADE ELECTRODE 10 FT (3 M) CORD HOLSTER: Brand: MEGADYNE

## (undated) DEVICE — MANIFOLD SURG NEPTUNE WST MGMT

## (undated) DEVICE — BLANKET WRM W29.9XL79.1IN UP BODY FORC AIR MISTRAL-AIR

## (undated) DEVICE — CUFF RESTRN WRST OR ANK 45FT AD FOAM

## (undated) DEVICE — COUNTER NDL 40 COUNT HLD 70 NUM FOAM BLK SGL MAG W BLDE REMV

## (undated) DEVICE — STERILE PVP: Brand: MEDLINE INDUSTRIES, INC.

## (undated) DEVICE — COTTON UNDERCAST PADDING,CRIMPED FINISH: Brand: WEBRIL

## (undated) DEVICE — TUBING, SUCTION, 1/4" X 12', STRAIGHT: Brand: MEDLINE

## (undated) DEVICE — SUTURE VCRL SZ 0 L18IN ABSRB UD L36MM CT-1 1/2 CIR J840D

## (undated) DEVICE — SYRINGE IRRIG 60ML SFT PLIABLE BLB EZ TO GRP 1 HND USE W/

## (undated) DEVICE — SOLUTION IV IRRIG WATER 1000ML POUR BRL 2F7114

## (undated) DEVICE — GOWN,SIRUS,POLYRNF,BRTHSLV,XL,30/CS: Brand: MEDLINE

## (undated) DEVICE — GOWN,SIRUS,NONRNF,3XL,18/CS: Brand: MEDLINE

## (undated) DEVICE — GLOVE SURG SZ 85 L12IN FNGR THK94MIL STD WHT ISOLEX LTX

## (undated) DEVICE — E-Z CLEAN, NON-STICK, PTFE COATED, ELECTROSURGICAL BLADE ELECTRODE, 2.5 INCH (6.35 CM): Brand: EZ CLEAN

## (undated) DEVICE — SOLUTION IV 1000ML 0.9% SOD CHL

## (undated) DEVICE — ORTHO PRE OP PACK: Brand: MEDLINE INDUSTRIES, INC.

## (undated) DEVICE — BIPOLAR SEALER 23-112-1 AQM 6.0: Brand: AQUAMANTYS ®

## (undated) DEVICE — YANKAUER,OPEN TIP,W/O VENT,STERILE: Brand: MEDLINE INDUSTRIES, INC.

## (undated) DEVICE — DECANTER FLD 9IN ST BG FOR ASEP TRNSF OF FLD

## (undated) DEVICE — SYSTEM SKIN CLSR 22CM DERMBND PRINEO

## (undated) DEVICE — SOLUTION IRRIG 1000ML 09% SOD CHL USP PIC PLAS CONTAINER

## (undated) DEVICE — SOLUTION IV 250ML 0.9% SOD CHL PH 5 INJ USP VIAFLX PLAS

## (undated) DEVICE — SUTURE MCRYL SZ 4-0 L27IN ABSRB UD L19MM PS-2 1/2 CIR PRIM Y426H

## (undated) DEVICE — BIT DRL L30MM DIA3.2MM DISP FOR G7 2 MOBILITY CONSTRUCT

## (undated) DEVICE — GLOVE ORTHO 7   MSG9470

## (undated) DEVICE — CARBIDE BUR,ROUND CUTTING, 8 FLUTES, MED., 4MM DIA.: Brand: MICROAIRE®

## (undated) DEVICE — SURE SET-DOUBLE BASIN-LF: Brand: MEDLINE INDUSTRIES, INC.

## (undated) DEVICE — SUTURE ETHLN SZ 4-0 L18IN NONABSORBABLE BLK L19MM PS-2 3/8 1667H

## (undated) DEVICE — DUAL CUT SAGITTAL BLADE

## (undated) DEVICE — 4.5 MM INCISOR PLUS STRAIGHT                                    BLADES, POWER/EP-1, VIOLET, PACKAGED                                    6 PER BOX, STERILE

## (undated) DEVICE — E-Z CLEAN, NON-STICK, PTFE COATED, ELECTROSURGICAL BLADE ELECTRODE, 4 INCH (10.2 CM): Brand: MEGADYNE